# Patient Record
Sex: MALE | Race: BLACK OR AFRICAN AMERICAN | NOT HISPANIC OR LATINO | Employment: FULL TIME | URBAN - METROPOLITAN AREA
[De-identification: names, ages, dates, MRNs, and addresses within clinical notes are randomized per-mention and may not be internally consistent; named-entity substitution may affect disease eponyms.]

---

## 2017-04-20 ENCOUNTER — CLINICAL SUPPORT (OUTPATIENT)
Dept: INTERNAL MEDICINE | Facility: CLINIC | Age: 42
End: 2017-04-20
Payer: COMMERCIAL

## 2017-04-20 ENCOUNTER — TELEPHONE (OUTPATIENT)
Dept: INTERNAL MEDICINE | Facility: CLINIC | Age: 42
End: 2017-04-20

## 2017-04-20 ENCOUNTER — OFFICE VISIT (OUTPATIENT)
Dept: INTERNAL MEDICINE | Facility: CLINIC | Age: 42
End: 2017-04-20
Payer: COMMERCIAL

## 2017-04-20 VITALS
OXYGEN SATURATION: 98 % | HEIGHT: 76 IN | DIASTOLIC BLOOD PRESSURE: 70 MMHG | BODY MASS INDEX: 32.85 KG/M2 | HEART RATE: 56 BPM | WEIGHT: 269.81 LBS | SYSTOLIC BLOOD PRESSURE: 112 MMHG

## 2017-04-20 DIAGNOSIS — Z00.00 ANNUAL PHYSICAL EXAM: Primary | ICD-10-CM

## 2017-04-20 DIAGNOSIS — R51.9 NONINTRACTABLE HEADACHE, UNSPECIFIED CHRONICITY PATTERN, UNSPECIFIED HEADACHE TYPE: ICD-10-CM

## 2017-04-20 DIAGNOSIS — Z00.00 ROUTINE GENERAL MEDICAL EXAMINATION AT A HEALTH CARE FACILITY: Primary | ICD-10-CM

## 2017-04-20 LAB
ALBUMIN SERPL BCP-MCNC: 3.7 G/DL
ALP SERPL-CCNC: 42 U/L
ALT SERPL W/O P-5'-P-CCNC: 17 U/L
ANION GAP SERPL CALC-SCNC: 8 MMOL/L
AST SERPL-CCNC: 16 U/L
BILIRUB SERPL-MCNC: 0.9 MG/DL
BUN SERPL-MCNC: 14 MG/DL
CALCIUM SERPL-MCNC: 8.8 MG/DL
CHLORIDE SERPL-SCNC: 107 MMOL/L
CHOLEST/HDLC SERPL: 3.9 {RATIO}
CO2 SERPL-SCNC: 26 MMOL/L
CREAT SERPL-MCNC: 1.1 MG/DL
ERYTHROCYTE [DISTWIDTH] IN BLOOD BY AUTOMATED COUNT: 13.5 %
EST. GFR  (AFRICAN AMERICAN): >60 ML/MIN/1.73 M^2
EST. GFR  (NON AFRICAN AMERICAN): >60 ML/MIN/1.73 M^2
GLUCOSE SERPL-MCNC: 92 MG/DL
HCT VFR BLD AUTO: 40.3 %
HDL/CHOLESTEROL RATIO: 25.4 %
HDLC SERPL-MCNC: 209 MG/DL
HDLC SERPL-MCNC: 53 MG/DL
HGB BLD-MCNC: 13.9 G/DL
HIV 1+2 AB+HIV1 P24 AG SERPL QL IA: NEGATIVE
LDLC SERPL CALC-MCNC: 145.2 MG/DL
MCH RBC QN AUTO: 29.1 PG
MCHC RBC AUTO-ENTMCNC: 34.5 %
MCV RBC AUTO: 84 FL
NONHDLC SERPL-MCNC: 156 MG/DL
PLATELET # BLD AUTO: 220 K/UL
PMV BLD AUTO: 9.5 FL
POTASSIUM SERPL-SCNC: 4.3 MMOL/L
PROT SERPL-MCNC: 6.8 G/DL
RBC # BLD AUTO: 4.78 M/UL
SODIUM SERPL-SCNC: 141 MMOL/L
TRIGL SERPL-MCNC: 54 MG/DL
WBC # BLD AUTO: 3.33 K/UL

## 2017-04-20 PROCEDURE — 86703 HIV-1/HIV-2 1 RESULT ANTBDY: CPT

## 2017-04-20 PROCEDURE — 36415 COLL VENOUS BLD VENIPUNCTURE: CPT

## 2017-04-20 PROCEDURE — 99385 PREV VISIT NEW AGE 18-39: CPT | Mod: S$GLB,,, | Performed by: FAMILY MEDICINE

## 2017-04-20 PROCEDURE — 85027 COMPLETE CBC AUTOMATED: CPT

## 2017-04-20 PROCEDURE — 80053 COMPREHEN METABOLIC PANEL: CPT

## 2017-04-20 PROCEDURE — 99999 PR PBB SHADOW E&M-NEW PATIENT-LVL III: CPT | Mod: PBBFAC,,, | Performed by: FAMILY MEDICINE

## 2017-04-20 PROCEDURE — 80061 LIPID PANEL: CPT

## 2017-04-20 NOTE — MR AVS SNAPSHOT
"    [facility]  [address]  [place] [address]  Phone: [phone]  Fax: [phone]                  Daniel Garcias   2017 10:00 AM   Office Visit    Description:  Male : 1975   Provider:  Donte Izquierdo MD   Department:  [facility]           Reason for Visit     Executive Health           Diagnoses this Visit        Comments    Annual physical exam    -  Primary     Nonintractable headache, unspecified chronicity pattern, unspecified headache type     followed by outside neurologist           To Do List           Goals (5 Years of Data)     None      Follow-Up and Disposition     Return in about 1 year (around 2018) for annual physical exam.    Follow-up and Disposition History      [facility]     [facility] Care Line -  Assistance  Unless otherwise directed by your provider, please contact [facility], our nurse care line that is available for  assistance.     Registered nurses in the [facility] provide: appointment scheduling, clinical advisement, health education, and other advisory services.  Call: [phone] (toll free)               Medications           Message regarding Medications     Verify the changes and/or additions to your medication regime listed below are the same as discussed with your clinician today.  If any of these changes or additions are incorrect, please notify your healthcare provider.             Verify that the below list of medications is an accurate representation of the medications you are currently taking.  If none reported, the list may be blank. If incorrect, please contact your healthcare provider. Carry this list with you in case of emergency.                Clinical Reference Information           Your Vitals Were     BP Pulse Height Weight SpO2 BMI    112/70 (BP Location: Left arm, Patient Position: Sitting, BP Method: Manual) 56 6' 4" (1.93 m) 122.4 kg (269 lb " 13.5 oz) 98% 32.85 kg/m2      Blood Pressure          Most Recent Value    BP  112/70      Allergies as of 4/20/2017     Shellfish Containing Products      Immunizations Administered on Date of Encounter - 4/20/2017     None      [facility] Sign-Up     Activating your [facility] account is as easy as 1-2-3!     1) Visit [email or web address], select Sign Up Now, enter this activation code and your date of birth, then select Next.  [email or web address]  Expires: 6/4/2017 10:24 AM      2) Create a username and password to use when you visit [facility] in the future and select a security question in case you lose your password and select Next.    3) Enter your e-mail address and click Sign Up!    Additional Information  If you have questions, please e-mail [email or web address] or call [phone] to talk to our [facility] staff. Remember, [facility] is NOT to be used for urgent needs. For medical emergencies, dial 911.         Instructions      http://www.nhlbi.nih.gov/health/health-topics/topics/hbc    http://www.nhlbi.nih.gov/health/educational/lose_wt/index.htm    http://www.nhlbi.nih.gov/files/docs/public/heart/hbp_low.pdf             Language Assistance Services     ATTENTION: Language assistance services are available, free of charge. Please call [phone].      ATENCIÓN: Si habla español, tiene a morrissey disposición servicios gratuitos de asistencia lingüística. Llame al [phone].     CHÚ Ý: N?u b?n nói Ti?ng Vi?t, có các d?ch v? h? tr? ngôn ng? mi?n phí dành cho b?n. G?i s? [phone].         [facility] complies with applicable Federal civil rights laws and does not discriminate on the basis of race, color, national origin, age, disability, or sex.

## 2017-04-20 NOTE — PROGRESS NOTES
Subjective:       Patient ID: Daniel Garcias is a 41 y.o. male.    Chief Complaint: Executive Health    HPI Comments: Established patient for an annual wellness check/physical exam. No specific complaints, please see ROS.      Review of Systems   Constitutional: Negative for chills, fatigue and fever.   HENT: Negative for congestion and trouble swallowing.    Eyes: Negative for redness.   Respiratory: Negative for cough, chest tightness and shortness of breath.    Cardiovascular: Negative for chest pain, palpitations and leg swelling.   Gastrointestinal: Negative for abdominal pain and blood in stool.   Genitourinary: Negative for hematuria.   Musculoskeletal: Negative for arthralgias, back pain, gait problem, joint swelling, myalgias and neck pain.   Skin: Negative for color change and rash.   Neurological: Positive for headaches. Negative for tremors, speech difficulty, weakness and numbness.   Hematological: Negative for adenopathy. Does not bruise/bleed easily.   Psychiatric/Behavioral: Negative for behavioral problems, confusion and sleep disturbance. The patient is not nervous/anxious.        Objective:      Physical Exam   Constitutional: He appears well-developed and well-nourished.   HENT:   Head: Normocephalic.   Right Ear: Tympanic membrane, external ear and ear canal normal.   Left Ear: Tympanic membrane, external ear and ear canal normal.   Mouth/Throat: Oropharynx is clear and moist.   Eyes: Pupils are equal, round, and reactive to light.   Neck: Normal range of motion. Neck supple. Carotid bruit is not present. No thyromegaly present.   Cardiovascular: Normal rate, regular rhythm, normal heart sounds and intact distal pulses.  Exam reveals no gallop and no friction rub.    No murmur heard.  Pulmonary/Chest: Effort normal and breath sounds normal.   Abdominal: Soft. Bowel sounds are normal. He exhibits no distension and no mass. There is no tenderness. There is no rebound and no guarding.    Musculoskeletal: Normal range of motion. He exhibits no edema or tenderness.   Lymphadenopathy:     He has no cervical adenopathy.   Neurological: He is alert. He has normal strength. He displays normal reflexes. No cranial nerve deficit or sensory deficit. Coordination and gait normal.   Skin: Skin is warm and dry.   Psychiatric: He has a normal mood and affect. His behavior is normal. Judgment and thought content normal.   Nursing note and vitals reviewed.      Assessment:       1. Annual physical exam    2. Nonintractable headache, unspecified chronicity pattern, unspecified headache type        Plan:   Daniel was seen today for [employer] health.    Diagnoses and all orders for this visit:    Annual physical exam    Nonintractable headache, unspecified chronicity pattern, unspecified headache type  Comments:  followed by outside neurologist      See meds, orders, follow up, routing and instructions sections of encounter.  This gentleman is in for an executive health physical examination as a new   patient.  He had what may have been a TIA in 11/2007 with no recurrences.  He   described this as numbness to the right side of the body.  He had an extensive   workup including an echo, MRA and MRI of brain and MRI of the C-spine, all of   which were reviewed.    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      RECOMMENDATIONS:  He did have elevated cholesterol in the past, similar today,   and discussed diet and exercise, some component of this is genetic.  Presumed   follow up in one year.      MELANIE/ZOË  dd: 04/20/2017 13:39:44 (CDT)  td: 04/21/2017 03:38:25 (CDT)  Doc ID   #[id number]  Job ID #[id number]    CC:

## 2017-04-20 NOTE — PROGRESS NOTES
Please call patient and report test/lab results were ok and that the cholesterol level was almost exactly same as last year. I sent him a lab letter with the actual numbers. Thanks.

## 2017-12-05 ENCOUNTER — OFFICE VISIT (OUTPATIENT)
Dept: INTERNAL MEDICINE | Facility: CLINIC | Age: 42
End: 2017-12-05
Attending: FAMILY MEDICINE
Payer: COMMERCIAL

## 2017-12-05 VITALS
DIASTOLIC BLOOD PRESSURE: 90 MMHG | SYSTOLIC BLOOD PRESSURE: 145 MMHG | HEIGHT: 76 IN | BODY MASS INDEX: 33.41 KG/M2 | WEIGHT: 274.38 LBS

## 2017-12-05 DIAGNOSIS — R14.0 BLOATING: Primary | ICD-10-CM

## 2017-12-05 DIAGNOSIS — M54.5 LOW BACK PAIN, UNSPECIFIED BACK PAIN LATERALITY, UNSPECIFIED CHRONICITY, WITH SCIATICA PRESENCE UNSPECIFIED: ICD-10-CM

## 2017-12-05 DIAGNOSIS — R03.0 BLOOD PRESSURE ELEVATED WITHOUT HISTORY OF HTN: ICD-10-CM

## 2017-12-05 PROCEDURE — 99999 PR PBB SHADOW E&M-EST. PATIENT-LVL III: CPT | Mod: PBBFAC,,, | Performed by: FAMILY MEDICINE

## 2017-12-05 PROCEDURE — 99214 OFFICE O/P EST MOD 30 MIN: CPT | Mod: S$GLB,,, | Performed by: FAMILY MEDICINE

## 2017-12-05 RX ORDER — GABAPENTIN 300 MG/1
CAPSULE ORAL
COMMUNITY
Start: 2017-09-21 | End: 2018-03-12

## 2017-12-05 NOTE — PROGRESS NOTES
Subjective:       Patient ID: Daniel Garcias is a 42 y.o. male.    Chief Complaint: Follow-up    HPI  Review of Systems   Constitutional: Negative for chills, fatigue and fever.   HENT: Negative for congestion and trouble swallowing.    Eyes: Negative for redness.   Respiratory: Negative for cough, chest tightness and shortness of breath.    Cardiovascular: Negative for chest pain, palpitations and leg swelling.   Gastrointestinal: Positive for abdominal distention. Negative for abdominal pain, blood in stool and diarrhea.   Genitourinary: Negative for hematuria.   Musculoskeletal: Negative for arthralgias, back pain, gait problem, joint swelling, myalgias and neck pain.   Skin: Negative for color change and rash.   Neurological: Negative for tremors, speech difficulty, weakness, numbness and headaches.   Hematological: Negative for adenopathy. Does not bruise/bleed easily.   Psychiatric/Behavioral: Negative for behavioral problems, confusion and sleep disturbance. The patient is not nervous/anxious.        Objective:      Physical Exam   Constitutional: He is oriented to person, place, and time. He appears well-developed and well-nourished.   Eyes: No scleral icterus.   Neck: Normal range of motion. Neck supple. Carotid bruit is not present.   Cardiovascular: Normal rate, regular rhythm, normal heart sounds and intact distal pulses.  Exam reveals no gallop and no friction rub.    No murmur heard.  Pulmonary/Chest: Effort normal and breath sounds normal. No respiratory distress. He has no wheezes. He has no rales.   Abdominal: Soft. Bowel sounds are normal. He exhibits no distension and no mass. There is no tenderness. There is no rebound and no guarding.   Musculoskeletal: He exhibits no edema.   Neurological: He is alert and oriented to person, place, and time. He displays no tremor. No cranial nerve deficit. Coordination and gait normal.   Skin: Skin is warm and dry. No rash noted. He is not diaphoretic. No  erythema.   Psychiatric: He has a normal mood and affect. His behavior is normal. Judgment and thought content normal.   Nursing note and vitals reviewed.      Assessment:       1. Bloating    2. Low back pain, unspecified back pain laterality, unspecified chronicity, with sciatica presence unspecified    3. Blood pressure elevated without history of HTN        Plan:   Daniel was seen today for follow-up.    Diagnoses and all orders for this visit:    Bloating    Low back pain, unspecified back pain laterality, unspecified chronicity, with sciatica presence unspecified    Blood pressure elevated without history of HTN    Other orders  -     ranitidine (ZANTAC) 300 MG tablet; Take 1 tablet (300 mg total) by mouth every evening.      See meds, orders, follow up, routing and instructions sections of encounter.  A  42-year-old established male patient with gas and bloating one month.  No   definite diarrhea.  No fever, chills, abdominal pain or constitutional   complaints.    RECOMMENDATIONS:  Trial of Zantac, trial of probiotic.   Follow up in one month   for blood pressure recheck.      SHERRON  dd: 12/05/2017 17:40:57 (CST)  td: 12/06/2017 03:01:17 (CST)  Doc ID   #[id number]  Job ID #[id number]    CC:

## 2018-01-18 ENCOUNTER — TELEPHONE (OUTPATIENT)
Dept: INTERNAL MEDICINE | Facility: CLINIC | Age: 43
End: 2018-01-18

## 2018-03-12 ENCOUNTER — OFFICE VISIT (OUTPATIENT)
Dept: GASTROENTEROLOGY | Facility: CLINIC | Age: 43
End: 2018-03-12
Payer: COMMERCIAL

## 2018-03-12 VITALS
BODY MASS INDEX: 34.17 KG/M2 | SYSTOLIC BLOOD PRESSURE: 153 MMHG | HEIGHT: 76 IN | HEART RATE: 64 BPM | WEIGHT: 280.63 LBS | DIASTOLIC BLOOD PRESSURE: 98 MMHG

## 2018-03-12 DIAGNOSIS — Z80.0 FAMILY HISTORY OF COLON CANCER: Primary | ICD-10-CM

## 2018-03-12 DIAGNOSIS — R14.0 BLOATING: ICD-10-CM

## 2018-03-12 PROCEDURE — 99999 PR PBB SHADOW E&M-EST. PATIENT-LVL III: CPT | Mod: PBBFAC,,, | Performed by: INTERNAL MEDICINE

## 2018-03-12 PROCEDURE — 99204 OFFICE O/P NEW MOD 45 MIN: CPT | Mod: S$GLB,,, | Performed by: INTERNAL MEDICINE

## 2018-03-12 NOTE — PROGRESS NOTES
Subjective:       Patient ID: Daniel Garcias is a 42 y.o. male.    Chief Complaint: Gas; Bloated; and Constipation    Constipation   This is a new problem. Episode onset: 2 months. The problem is unchanged. His stool frequency is 1 time per day. The patient is on a high fiber diet. He exercises regularly. There has been adequate water intake. Associated symptoms include abdominal pain (intermittent) and bloating. Pertinent negatives include no hematochezia, nausea or weight loss. He has tried nothing for the symptoms. There is no history of abdominal surgery, irritable bowel syndrome or psychiatric history.   Has intractable bloating and gas.  [place] 3 or 4.    Maternal grand mother with a history of Colon cancer. Also a maternal aunt with a history of pancreatic cancer.   Review of Systems   Constitutional: Negative for weight loss.   Gastrointestinal: Positive for abdominal pain (intermittent), bloating and constipation. Negative for hematochezia and nausea.       No past medical history on file.    Past Surgical History:   Procedure Laterality Date    KNEE ARTHROSCOPY W/ ACL RECONSTRUCTION      left    KNEE ARTHROSCOPY W/ MENISCAL REPAIR  1998    left       Family History   Problem Relation Age of Onset    Hypertension Mother     Hyperlipidemia Father     Cancer Maternal Grandmother      colon       Social History     Social History    Marital status:      Spouse name: N/A    Number of children: 3    Years of education: N/A     Occupational History    Operations      Social History Main Topics    Smoking status: Never Smoker    Smokeless tobacco: Never Used    Alcohol use No    Drug use: No    Sexual activity: Yes     Partners: Female     Other Topics Concern    None     Social History Narrative    None       Current Outpatient Prescriptions   Medication Sig Dispense Refill    peg 3350-electrolytes (COLYTE WITH FLAVOR PACKS) 227.1-21.5-6.36 gram SolR Take 4 L by mouth once. 1 Bottle 0      No current facility-administered medications for this visit.        Review of patient's allergies indicates:   Allergen Reactions    Shellfish containing products Hives       Objective:       Vitals:    03/12/18 1515   BP: (!) 153/98   Pulse: 64       Physical Exam   Constitutional: He is oriented to person, place, and time. He appears well-nourished.   Eyes: Pupils are equal, round, and reactive to light.   Cardiovascular: Normal rate, regular rhythm and normal heart sounds.    Pulmonary/Chest: No respiratory distress. He has no wheezes.   Abdominal: He exhibits no mass. There is no tenderness. There is no rebound and no guarding.   Musculoskeletal: He exhibits no edema.   Neurological: He is alert and oriented to person, place, and time.   Skin: No rash noted.   Psychiatric: He has a normal mood and affect. Thought content normal.       Assessment:       1. Family history of colon cancer    2. Bloating        Plan:       Daniel was seen today for gas, bloated and constipation.    Diagnoses and all orders for this visit:    Family history of colon cancer  Bloating  -     Case request GI: COLONOSCOPY golytely    Other orders  -     peg 3350-electrolytes (COLYTE WITH FLAVOR PACKS) 227.1-21.5-6.36 gram SolR; Take 4 L by mouth once.

## 2018-04-11 ENCOUNTER — ANESTHESIA EVENT (OUTPATIENT)
Dept: ENDOSCOPY | Facility: HOSPITAL | Age: 43
End: 2018-04-11
Payer: COMMERCIAL

## 2018-04-11 ENCOUNTER — SURGERY (OUTPATIENT)
Age: 43
End: 2018-04-11

## 2018-04-11 ENCOUNTER — HOSPITAL ENCOUNTER (OUTPATIENT)
Facility: HOSPITAL | Age: 43
Discharge: HOME | End: 2018-04-11
Attending: INTERNAL MEDICINE | Admitting: INTERNAL MEDICINE
Payer: COMMERCIAL

## 2018-04-11 ENCOUNTER — ANESTHESIA (OUTPATIENT)
Dept: ENDOSCOPY | Facility: HOSPITAL | Age: 43
End: 2018-04-11
Payer: COMMERCIAL

## 2018-04-11 DIAGNOSIS — Z80.0 FAMILY HISTORY OF COLON CANCER: ICD-10-CM

## 2018-04-11 PROCEDURE — 37000008 HC ANESTHESIA 1ST 15 MINUTES: Performed by: INTERNAL MEDICINE

## 2018-04-11 PROCEDURE — G0105 COLORECTAL SCRN; HI RISK IND: HCPCS | Mod: ,,, | Performed by: INTERNAL MEDICINE

## 2018-04-11 PROCEDURE — 45378 DIAGNOSTIC COLONOSCOPY: CPT | Performed by: INTERNAL MEDICINE

## 2018-04-11 PROCEDURE — 37000009 HC ANESTHESIA EA ADD 15 MINS: Performed by: INTERNAL MEDICINE

## 2018-04-11 PROCEDURE — 25000003 PHARM REV CODE 250: Performed by: INTERNAL MEDICINE

## 2018-04-11 PROCEDURE — 63600175 PHARM REV CODE 636 W HCPCS: Performed by: NURSE ANESTHETIST, CERTIFIED REGISTERED

## 2018-04-11 RX ORDER — PROPOFOL 10 MG/ML
VIAL (ML) INTRAVENOUS
Status: DISCONTINUED | OUTPATIENT
Start: 2018-04-11 | End: 2018-04-11

## 2018-04-11 RX ORDER — SODIUM CHLORIDE 9 MG/ML
INJECTION, SOLUTION INTRAVENOUS CONTINUOUS
Status: DISCONTINUED | OUTPATIENT
Start: 2018-04-11 | End: 2018-04-11 | Stop reason: HOSPADM

## 2018-04-11 RX ORDER — LIDOCAINE HCL/PF 100 MG/5ML
SYRINGE (ML) INTRAVENOUS
Status: DISCONTINUED | OUTPATIENT
Start: 2018-04-11 | End: 2018-04-11

## 2018-04-11 RX ORDER — PROPOFOL 10 MG/ML
VIAL (ML) INTRAVENOUS CONTINUOUS PRN
Status: DISCONTINUED | OUTPATIENT
Start: 2018-04-11 | End: 2018-04-11

## 2018-04-11 RX ADMIN — PROPOFOL 20 MG: 10 INJECTION, EMULSION INTRAVENOUS at 02:04

## 2018-04-11 RX ADMIN — LIDOCAINE HYDROCHLORIDE 20 MG: 20 INJECTION, SOLUTION INTRAVENOUS at 02:04

## 2018-04-11 RX ADMIN — SODIUM CHLORIDE: 0.9 INJECTION, SOLUTION INTRAVENOUS at 12:04

## 2018-04-11 RX ADMIN — PROPOFOL 30 MG: 10 INJECTION, EMULSION INTRAVENOUS at 02:04

## 2018-04-11 RX ADMIN — PROPOFOL 80 MG: 10 INJECTION, EMULSION INTRAVENOUS at 02:04

## 2018-04-11 RX ADMIN — PROPOFOL 10 MG: 10 INJECTION, EMULSION INTRAVENOUS at 02:04

## 2018-04-11 RX ADMIN — PROPOFOL 150 MCG/KG/MIN: 10 INJECTION, EMULSION INTRAVENOUS at 02:04

## 2018-04-11 NOTE — INTERVAL H&P NOTE
The patient has been examined and the H&P has been reviewed:    I concur with the findings and no changes have occurred since H&P was written.    Anesthesia/Surgery risks, benefits and alternative options discussed and understood by patient/family.          Active Hospital Problems    Diagnosis  POA    Family history of colon cancer [Z80.0]  Not Applicable      Resolved Hospital Problems    Diagnosis Date Resolved POA   No resolved problems to display.

## 2018-04-11 NOTE — PROVATION PATIENT INSTRUCTIONS
Discharge Summary/Instructions after an Endoscopic Procedure  Patient Name: Daniel Garcias  Patient MRN: [id number]  Patient YOB: 1975 Wednesday, April 11, 2018  Deborah Gamino MD  RESTRICTIONS:  During your procedure today, you received medications for sedation.  These   medications may affect your judgment, balance and coordination.  Therefore,   for 24 hours, you have the following restrictions:   - DO NOT drive a car, operate machinery, make legal/financial decisions,   sign important papers or drink alcohol.    ACTIVITY:  The following day: return to full activity including work, except no heavy   lifting, straining or running for 3 days if polyps were removed.  DIET:  Eat and drink normally unless instructed otherwise.     TREATMENT FOR COMMON SIDE EFFECTS:  - Mild abdominal pain, nausea, belching, bloating or excessive gas:  rest,   eat lightly and use a heating pad.  - Sore Throat: treat with throat lozenges and/or gargle with warm salt   water.  - Because air was used during the procedure, expelling large amounts of air   from your rectum or belching is normal.  - If a bowel prep was taken, you may not have a bowel movement for 1-3 days.    This is normal.  SYMPTOMS TO WATCH FOR AND REPORT TO YOUR PHYSICIAN:  1. Abdominal pain or bloating, other than gas cramps.  2. Chest pain.  3. Back pain.  4. Signs of infection such as: chills or fever occurring within 24 hours   after the procedure.  5. Rectal bleeding, which would show as bright red, maroon, or black stools.   (A tablespoon of blood from the rectum is not serious, especially if   hemorrhoids are present.)  6. Vomiting.  7. Weakness or dizziness.  GO DIRECTLY TO THE NEAREST EMERGENCY ROOM IF YOU HAVE ANY OF THE FOLLOWING:      Difficulty breathing              Chills and/or fever over 101 F   Persistent vomiting and/or vomiting blood   Severe abdominal pain   Severe chest pain   Black, tarry stools   Bleeding- more than one  tablespoon   Any other symptom or condition that you feel may need urgent attention  Your doctor recommends these additional instructions:  If any biopsies were taken, your doctors clinic will contact you in 1 to 2   weeks with any results.  - Discharge patient to home.   - Repeat colonoscopy in 5 years for surveillance.  For questions, problems or results please call your physician - Deborah Gamino MD at Work:  [phone].  EMERGENCY PHONE NUMBER: [phone],  LAB RESULTS: [phone]  IF A COMPLICATION OR EMERGENCY SITUATION ARISES AND YOU ARE UNABLE TO REACH   YOUR PHYSICIAN - GO DIRECTLY TO THE EMERGENCY ROOM.  Deborah Gamino MD  4/11/2018 2:57:11 PM  This report has been verified and signed electronically.

## 2018-04-11 NOTE — PLAN OF CARE
No past medical history on file.  Accompanied by spouse, ok for MD to speak to her re results of exam.

## 2018-04-11 NOTE — TRANSFER OF CARE
"Anesthesia Transfer of Care Note    Patient: Daniel Garcias    Procedure(s) Performed: Procedure(s) (LRB):  COLONOSCOPY golytely (N/A)    Patient location: GI    Anesthesia Type: MAC    Transport from OR: Transported from OR on room air with adequate spontaneous ventilation    Post pain: adequate analgesia    Post assessment: no apparent anesthetic complications    Post vital signs: stable    Level of consciousness: sedated    Nausea/Vomiting: no nausea/vomiting    Complications: none    Transfer of care protocol was followed      Last vitals:   Visit Vitals  BP (!) 154/70 (BP Location: Left arm)   Pulse 75   Temp 37.3 °C (99.1 °F) (Oral)   Resp 20   Ht 6' 4" (1.93 m)   Wt 122.5 kg (270 lb)   SpO2 98%   BMI 32.87 kg/m²     "

## 2018-04-11 NOTE — ANESTHESIA POSTPROCEDURE EVALUATION
"Anesthesia Post Evaluation    Patient: Daniel Garcias    Procedure(s) Performed: Procedure(s) (LRB):  COLONOSCOPY golytely (N/A)    Final Anesthesia Type: MAC  Patient location during evaluation: GI PACU  Patient participation: Yes- Able to Participate  Level of consciousness: awake and alert and oriented  Post-procedure vital signs: reviewed and stable  Pain management: adequate  Airway patency: patent  PONV status at discharge: No PONV  Anesthetic complications: no      Cardiovascular status: stable, hemodynamically stable and blood pressure returned to baseline  Respiratory status: room air, spontaneous ventilation and unassisted  Hydration status: euvolemic  Follow-up not needed.        Visit Vitals  BP (!) 154/70 (BP Location: Left arm)   Pulse 75   Temp 37.3 °C (99.1 °F) (Oral)   Resp 20   Ht 6' 4" (1.93 m)   Wt 122.5 kg (270 lb)   SpO2 98%   BMI 32.87 kg/m²       Pain/Wendy Score: No Data Recorded      "

## 2018-04-11 NOTE — H&P (VIEW-ONLY)
Subjective:       Patient ID: Daniel Garcias is a 42 y.o. male.    Chief Complaint: Gas; Bloated; and Constipation    Constipation   This is a new problem. Episode onset: 2 months. The problem is unchanged. His stool frequency is 1 time per day. The patient is on a high fiber diet. He exercises regularly. There has been adequate water intake. Associated symptoms include abdominal pain (intermittent) and bloating. Pertinent negatives include no hematochezia, nausea or weight loss. He has tried nothing for the symptoms. There is no history of abdominal surgery, irritable bowel syndrome or psychiatric history.   Has intractable bloating and gas.  [place] 3 or 4.    Maternal grand mother with a history of Colon cancer. Also a maternal aunt with a history of pancreatic cancer.   Review of Systems   Constitutional: Negative for weight loss.   Gastrointestinal: Positive for abdominal pain (intermittent), bloating and constipation. Negative for hematochezia and nausea.       No past medical history on file.    Past Surgical History:   Procedure Laterality Date    KNEE ARTHROSCOPY W/ ACL RECONSTRUCTION      left    KNEE ARTHROSCOPY W/ MENISCAL REPAIR  1998    left       Family History   Problem Relation Age of Onset    Hypertension Mother     Hyperlipidemia Father     Cancer Maternal Grandmother      colon       Social History     Social History    Marital status:      Spouse name: N/A    Number of children: 3    Years of education: N/A     Occupational History    Operations      Social History Main Topics    Smoking status: Never Smoker    Smokeless tobacco: Never Used    Alcohol use No    Drug use: No    Sexual activity: Yes     Partners: Female     Other Topics Concern    None     Social History Narrative    None       Current Outpatient Prescriptions   Medication Sig Dispense Refill    peg 3350-electrolytes (COLYTE WITH FLAVOR PACKS) 227.1-21.5-6.36 gram SolR Take 4 L by mouth once. 1 Bottle 0      No current facility-administered medications for this visit.        Review of patient's allergies indicates:   Allergen Reactions    Shellfish containing products Hives       Objective:       Vitals:    03/12/18 1515   BP: (!) 153/98   Pulse: 64       Physical Exam   Constitutional: He is oriented to person, place, and time. He appears well-nourished.   Eyes: Pupils are equal, round, and reactive to light.   Cardiovascular: Normal rate, regular rhythm and normal heart sounds.    Pulmonary/Chest: No respiratory distress. He has no wheezes.   Abdominal: He exhibits no mass. There is no tenderness. There is no rebound and no guarding.   Musculoskeletal: He exhibits no edema.   Neurological: He is alert and oriented to person, place, and time.   Skin: No rash noted.   Psychiatric: He has a normal mood and affect. Thought content normal.       Assessment:       1. Family history of colon cancer    2. Bloating        Plan:       Daniel was seen today for gas, bloated and constipation.    Diagnoses and all orders for this visit:    Family history of colon cancer  Bloating  -     Case request GI: COLONOSCOPY golytely    Other orders  -     peg 3350-electrolytes (COLYTE WITH FLAVOR PACKS) 227.1-21.5-6.36 gram SolR; Take 4 L by mouth once.

## 2018-04-11 NOTE — ANESTHESIA PREPROCEDURE EVALUATION
04/11/2018  Daniel Garcias is a 42 y.o., male for colonoscopy under MAC    Anesthesia Evaluation    I have reviewed the Patient Summary Reports.     I have reviewed the Medications.     Review of Systems  Social:  Non-Smoker, No Alcohol Use    Cardiovascular:   Exercise tolerance: good    Hepatic/GI:   Bowel Prep.        Physical Exam  General:  Obesity       Chest/Lungs:  Chest/Lungs Clear    Heart/Vascular:  Heart Findings: Normal            Anesthesia Plan  Type of Anesthesia, risks & benefits discussed:  Anesthesia Type:  MAC  Patient's Preference:   Intra-op Monitoring Plan:   Intra-op Monitoring Plan Comments:   Post Op Pain Control Plan:   Post Op Pain Control Plan Comments:   Induction:    Beta Blocker:  Patient is not currently on a Beta-Blocker (No further documentation required).       Informed Consent: Patient understands risks and agrees with Anesthesia plan.  Questions answered. Anesthesia consent signed with patient.  ASA Score: 1     Day of Surgery Review of History & Physical:            Ready For Surgery From Anesthesia Perspective.

## 2018-04-12 VITALS
HEIGHT: 76 IN | DIASTOLIC BLOOD PRESSURE: 76 MMHG | TEMPERATURE: 99 F | BODY MASS INDEX: 32.88 KG/M2 | HEART RATE: 62 BPM | WEIGHT: 270 LBS | RESPIRATION RATE: 16 BRPM | OXYGEN SATURATION: 100 % | SYSTOLIC BLOOD PRESSURE: 154 MMHG

## 2018-05-16 DIAGNOSIS — Z00.00 ROUTINE GENERAL MEDICAL EXAMINATION AT A HEALTH CARE FACILITY: Primary | ICD-10-CM

## 2018-06-04 ENCOUNTER — OFFICE VISIT (OUTPATIENT)
Dept: INTERNAL MEDICINE | Facility: CLINIC | Age: 43
End: 2018-06-04
Attending: FAMILY MEDICINE
Payer: COMMERCIAL

## 2018-06-04 ENCOUNTER — HOSPITAL ENCOUNTER (OUTPATIENT)
Dept: CARDIOLOGY | Facility: CLINIC | Age: 43
Discharge: HOME | End: 2018-06-04
Attending: FAMILY MEDICINE
Payer: COMMERCIAL

## 2018-06-04 ENCOUNTER — CLINICAL SUPPORT (OUTPATIENT)
Dept: INTERNAL MEDICINE | Facility: CLINIC | Age: 43
End: 2018-06-04
Payer: COMMERCIAL

## 2018-06-04 VITALS
WEIGHT: 271.63 LBS | SYSTOLIC BLOOD PRESSURE: 121 MMHG | DIASTOLIC BLOOD PRESSURE: 81 MMHG | HEART RATE: 63 BPM | HEIGHT: 76 IN | BODY MASS INDEX: 33.08 KG/M2

## 2018-06-04 DIAGNOSIS — Z00.00 ROUTINE GENERAL MEDICAL EXAMINATION AT A HEALTH CARE FACILITY: Primary | ICD-10-CM

## 2018-06-04 DIAGNOSIS — E78.5 HYPERLIPIDEMIA, UNSPECIFIED HYPERLIPIDEMIA TYPE: ICD-10-CM

## 2018-06-04 DIAGNOSIS — Z00.00 ANNUAL PHYSICAL EXAM: Primary | ICD-10-CM

## 2018-06-04 DIAGNOSIS — Z00.00 ROUTINE GENERAL MEDICAL EXAMINATION AT A HEALTH CARE FACILITY: ICD-10-CM

## 2018-06-04 LAB
ALBUMIN SERPL BCP-MCNC: 3.8 G/DL
ALP SERPL-CCNC: 44 U/L
ALT SERPL W/O P-5'-P-CCNC: 15 U/L
ANION GAP SERPL CALC-SCNC: 7 MMOL/L
AST SERPL-CCNC: 11 U/L
BILIRUB SERPL-MCNC: 0.7 MG/DL
BUN SERPL-MCNC: 13 MG/DL
CALCIUM SERPL-MCNC: 9.1 MG/DL
CHLORIDE SERPL-SCNC: 107 MMOL/L
CHOLEST SERPL-MCNC: 250 MG/DL
CHOLEST/HDLC SERPL: 5.3 {RATIO}
CO2 SERPL-SCNC: 29 MMOL/L
COMPLEXED PSA SERPL-MCNC: 0.59 NG/ML
CREAT SERPL-MCNC: 1.2 MG/DL
ERYTHROCYTE [DISTWIDTH] IN BLOOD BY AUTOMATED COUNT: 13.2 %
EST. GFR  (AFRICAN AMERICAN): >60 ML/MIN/1.73 M^2
EST. GFR  (NON AFRICAN AMERICAN): >60 ML/MIN/1.73 M^2
GLUCOSE SERPL-MCNC: 89 MG/DL
HCT VFR BLD AUTO: 43.4 %
HDLC SERPL-MCNC: 47 MG/DL
HDLC SERPL: 18.8 %
HGB BLD-MCNC: 14.5 G/DL
LDLC SERPL CALC-MCNC: 185 MG/DL
MCH RBC QN AUTO: 28.9 PG
MCHC RBC AUTO-ENTMCNC: 33.4 G/DL
MCV RBC AUTO: 87 FL
NONHDLC SERPL-MCNC: 203 MG/DL
PLATELET # BLD AUTO: 256 K/UL
PMV BLD AUTO: 9.6 FL
POTASSIUM SERPL-SCNC: 4.1 MMOL/L
PROT SERPL-MCNC: 7.1 G/DL
RBC # BLD AUTO: 5.01 M/UL
SODIUM SERPL-SCNC: 143 MMOL/L
TRIGL SERPL-MCNC: 90 MG/DL
WBC # BLD AUTO: 4.21 K/UL

## 2018-06-04 PROCEDURE — 84153 ASSAY OF PSA TOTAL: CPT

## 2018-06-04 PROCEDURE — 85027 COMPLETE CBC AUTOMATED: CPT

## 2018-06-04 PROCEDURE — 80053 COMPREHEN METABOLIC PANEL: CPT

## 2018-06-04 PROCEDURE — 80061 LIPID PANEL: CPT

## 2018-06-04 PROCEDURE — 99396 PREV VISIT EST AGE 40-64: CPT | Mod: S$GLB,,, | Performed by: FAMILY MEDICINE

## 2018-06-04 PROCEDURE — 99999 PR PBB SHADOW E&M-EST. PATIENT-LVL III: CPT | Mod: PBBFAC,,, | Performed by: FAMILY MEDICINE

## 2018-06-04 PROCEDURE — 93000 ELECTROCARDIOGRAM COMPLETE: CPT | Mod: S$GLB,,, | Performed by: INTERNAL MEDICINE

## 2018-06-04 NOTE — PROGRESS NOTES
Subjective:       Patient ID: Daniel Garcias is a 42 y.o. male.    Chief Complaint: Executive Health    Established patient for an annual wellness check/physical exam and also chronic disease management. Specific complaints - see dictation and please see ROS.        Review of Systems   Constitutional: Negative for chills, fatigue and fever.   HENT: Negative for congestion and trouble swallowing.    Eyes: Negative for redness.   Respiratory: Negative for cough, chest tightness and shortness of breath.    Cardiovascular: Negative for chest pain, palpitations and leg swelling.   Gastrointestinal: Positive for abdominal distention. Negative for abdominal pain and blood in stool.   Genitourinary: Negative for hematuria.   Musculoskeletal: Negative for arthralgias, back pain, gait problem, joint swelling, myalgias and neck pain.   Skin: Negative for color change and rash.   Neurological: Negative for tremors, speech difficulty, weakness, numbness and headaches.   Hematological: Negative for adenopathy. Does not bruise/bleed easily.   Psychiatric/Behavioral: Negative for behavioral problems, confusion and sleep disturbance. The patient is not nervous/anxious.        Objective:      Physical Exam   Constitutional: He appears well-developed and well-nourished.   HENT:   Head: Normocephalic.   Right Ear: Tympanic membrane, external ear and ear canal normal.   Left Ear: Tympanic membrane, external ear and ear canal normal.   Mouth/Throat: Oropharynx is clear and moist.   Eyes: Pupils are equal, round, and reactive to light.   Neck: Normal range of motion. Neck supple. Carotid bruit is not present. No thyromegaly present.   Cardiovascular: Normal rate, regular rhythm, normal heart sounds and intact distal pulses.  Exam reveals no gallop and no friction rub.    No murmur heard.  Pulmonary/Chest: Effort normal and breath sounds normal.   Abdominal: Soft. Bowel sounds are normal. He exhibits no distension and no mass. There is  no tenderness. There is no rebound and no guarding.   Musculoskeletal: Normal range of motion. He exhibits no edema or tenderness.   Lymphadenopathy:     He has no cervical adenopathy.   Neurological: He is alert. He has normal strength. He displays normal reflexes. No cranial nerve deficit or sensory deficit. Coordination and gait normal.   Skin: Skin is warm and dry.   Psychiatric: He has a normal mood and affect. His behavior is normal. Judgment and thought content normal.   Nursing note and vitals reviewed.      Assessment:       1. Annual physical exam    2. Hyperlipidemia, unspecified hyperlipidemia type        Plan:   Daniel was seen today for [facility].    Diagnoses and all orders for this visit:    Annual physical exam    Hyperlipidemia, unspecified hyperlipidemia type      See meds, orders, follow up, routing and instructions sections of encounter.  The patient is in for annual physical examination.  [facility] laboratory   was reviewed.  LDL cholesterol 185.  Cohort calculation 3.1%.  Discussed diet   and exercise.  He does have some bloating that seems to be a relatively   infrequent problem, occurring once every one to two weeks.  There is no definite   meal association.  He has no other acute complaints at this time.  Discussed   OTC gas control medications.  Diet and exercise was discussed and recommended.    Follow up in one year.      MELANIE/HN  dd: 06/04/2018 13:25:07 (CDT)  td: 06/05/2018 03:11:01 (CDT)  Doc ID   #[id number]  Job ID #[id number]    CC:

## 2018-06-04 NOTE — PATIENT INSTRUCTIONS
Information about cholesterol, high blood pressure and healthy diet and activity recommendations can be found at the following links on the Internet.    http://www.nhlbi.nih.gov/health/health-topics/topics/hbc    http://www.nhlbi.nih.gov/health/educational/lose_wt/index.htm    Http://www.nhlbi.nih.gov/files/docs/public/heart/hbp_low.pdf    http://www.heart.org/HEARTORG/    http://diabetes.org/    https://www.cdc.gov/    https://healthfinder.gov/        Simethicone/Gas X/Beano - for gas

## 2019-04-22 ENCOUNTER — OFFICE VISIT (OUTPATIENT)
Dept: INTERNAL MEDICINE | Facility: CLINIC | Age: 44
End: 2019-04-22
Attending: FAMILY MEDICINE
Payer: COMMERCIAL

## 2019-04-22 VITALS
TEMPERATURE: 98 F | SYSTOLIC BLOOD PRESSURE: 130 MMHG | HEIGHT: 77 IN | WEIGHT: 283.38 LBS | OXYGEN SATURATION: 98 % | HEART RATE: 58 BPM | DIASTOLIC BLOOD PRESSURE: 70 MMHG | BODY MASS INDEX: 33.46 KG/M2

## 2019-04-22 DIAGNOSIS — R51.9 INTRACTABLE EPISODIC HEADACHE, UNSPECIFIED HEADACHE TYPE: Primary | ICD-10-CM

## 2019-04-22 PROCEDURE — 99214 OFFICE O/P EST MOD 30 MIN: CPT | Mod: S$GLB,,, | Performed by: FAMILY MEDICINE

## 2019-04-22 PROCEDURE — 99999 PR PBB SHADOW E&M-EST. PATIENT-LVL IV: ICD-10-PCS | Mod: PBBFAC,,, | Performed by: FAMILY MEDICINE

## 2019-04-22 PROCEDURE — 99214 PR OFFICE/OUTPT VISIT, EST, LEVL IV, 30-39 MIN: ICD-10-PCS | Mod: S$GLB,,, | Performed by: FAMILY MEDICINE

## 2019-04-22 PROCEDURE — 99999 PR PBB SHADOW E&M-EST. PATIENT-LVL IV: CPT | Mod: PBBFAC,,, | Performed by: FAMILY MEDICINE

## 2019-04-22 RX ORDER — GABAPENTIN 100 MG/1
100 CAPSULE ORAL NIGHTLY PRN
Qty: 90 CAPSULE | Refills: 0 | Status: SHIPPED | OUTPATIENT
Start: 2019-04-22 | End: 2019-06-24 | Stop reason: ALTCHOICE

## 2019-04-22 NOTE — PROGRESS NOTES
Subjective:       Patient ID: Daniel Garcias is a 43 y.o. male.    Chief Complaint: Headache    HPI  Review of Systems   Constitutional: Negative for chills, fatigue, fever and unexpected weight change.   HENT: Negative for congestion and trouble swallowing.    Eyes: Negative for redness and visual disturbance.   Respiratory: Negative for cough, chest tightness and shortness of breath.    Cardiovascular: Negative for chest pain, palpitations and leg swelling.   Gastrointestinal: Negative for abdominal pain and blood in stool.   Genitourinary: Negative for difficulty urinating and hematuria.   Musculoskeletal: Negative for arthralgias, back pain, gait problem, joint swelling, myalgias and neck pain.   Skin: Negative for color change and rash.   Neurological: Positive for headaches. Negative for tremors, speech difficulty, weakness and numbness.   Hematological: Negative for adenopathy. Does not bruise/bleed easily.   Psychiatric/Behavioral: Negative for behavioral problems, confusion and sleep disturbance. The patient is not nervous/anxious.        Objective:      Physical Exam   Constitutional: He is oriented to person, place, and time. He appears well-developed and well-nourished. No distress.   HENT:   Head: Normocephalic and atraumatic.   Right Ear: Tympanic membrane and ear canal normal.   Left Ear: Tympanic membrane and ear canal normal.   Nose: Nose normal. Right sinus exhibits no maxillary sinus tenderness and no frontal sinus tenderness. Left sinus exhibits no maxillary sinus tenderness and no frontal sinus tenderness.   Mouth/Throat: Uvula is midline, oropharynx is clear and moist and mucous membranes are normal.   Eyes: Conjunctivae are normal. No scleral icterus.   Neck: Normal range of motion. Neck supple. Carotid bruit is not present.   Cardiovascular: Normal rate, regular rhythm, normal heart sounds and intact distal pulses. Exam reveals no gallop and no friction rub.   No murmur  heard.  Pulmonary/Chest: Effort normal and breath sounds normal. No respiratory distress. He has no wheezes. He has no rales.   Abdominal: He exhibits no distension. There is no tenderness.   Musculoskeletal: He exhibits no edema or deformity.        Right lower leg: He exhibits no edema.        Left lower leg: He exhibits no edema.   Lymphadenopathy:     He has no cervical adenopathy.   Neurological: He is alert and oriented to person, place, and time. He displays no tremor. No cranial nerve deficit. Coordination and gait normal. GCS eye subscore is 4. GCS verbal subscore is 5. GCS motor subscore is 6.   Skin: Skin is warm and dry. No rash noted. He is not diaphoretic. No erythema.   Psychiatric: He has a normal mood and affect. His behavior is normal. Judgment and thought content normal.   Nursing note and vitals reviewed.      Assessment:       1. Intractable episodic headache, unspecified headache type        Plan:   Daniel was seen today for headache.    Diagnoses and all orders for this visit:    Intractable episodic headache, unspecified headache type  -     Ambulatory referral to Neurology    Other orders  -     gabapentin (NEURONTIN) 100 MG capsule; Take 1 capsule (100 mg total) by mouth nightly as needed.      See meds, orders, follow up, routing and instructions sections of encounter.  A 43-year-old established male patient.  He is in with a headache.  This time is   three months, but this has been going on for a number of years, described as   right hemiface.  There are no associated neurologic deficits at this time.    Steady in its timing, though the severity increases and decreases, but never to   zero.  It has occurred intermittently.  He had an extensive workup with an event   that occurred in 2007 and has seen an outside neurologist on a couple of   subsequent encounters.  The event in 2007 was different because he had right   hemifacial dysfunction or tingling.  Workup at that time included CT  head,   carotid ultrasound, MRI brain, MRA brain, MRI neck, echocardiogram, all were on   11/13/2007 or 11/14/2007 with a discharge summary for that hospitalization week,   ultimate diagnosis was unclear, possible TIA, possible atypical headache.    RECOMMENDATIONS:  Trial of gabapentin 100 mg at bedtime, trial of magnesium   oxide OTC, Neurology Headache Service referral.  Follow up with me in two months   for his annual physical examination.  Laboratory is currently pending.      MELANIE/ZOË  dd: 04/22/2019 14:03:50 (CDT)  td: 04/23/2019 06:35:32 (CDT)  Doc ID   #[id number]  Job ID #[id number]    CC:

## 2019-04-23 ENCOUNTER — OFFICE VISIT (OUTPATIENT)
Dept: NEUROLOGY | Facility: CLINIC | Age: 44
End: 2019-04-23
Attending: PSYCHIATRY & NEUROLOGY
Payer: COMMERCIAL

## 2019-04-23 ENCOUNTER — PATIENT MESSAGE (OUTPATIENT)
Dept: NEUROLOGY | Facility: CLINIC | Age: 44
End: 2019-04-23

## 2019-04-23 ENCOUNTER — LAB VISIT (OUTPATIENT)
Dept: LAB | Facility: OTHER | Age: 44
End: 2019-04-23
Payer: COMMERCIAL

## 2019-04-23 VITALS — HEIGHT: 77 IN | WEIGHT: 286.19 LBS | BODY MASS INDEX: 33.79 KG/M2

## 2019-04-23 DIAGNOSIS — E78.5 HYPERLIPIDEMIA, UNSPECIFIED HYPERLIPIDEMIA TYPE: ICD-10-CM

## 2019-04-23 DIAGNOSIS — G50.0 TRIGEMINAL NEURALGIA: ICD-10-CM

## 2019-04-23 DIAGNOSIS — M54.31 SCIATICA OF RIGHT SIDE: ICD-10-CM

## 2019-04-23 LAB
25(OH)D3+25(OH)D2 SERPL-MCNC: 20 NG/ML (ref 30–96)
BASOPHILS # BLD AUTO: 0.01 K/UL (ref 0–0.2)
BASOPHILS NFR BLD: 0.2 % (ref 0–1.9)
DIFFERENTIAL METHOD: NORMAL
EOSINOPHIL # BLD AUTO: 0.1 K/UL (ref 0–0.5)
EOSINOPHIL NFR BLD: 2.4 % (ref 0–8)
ERYTHROCYTE [DISTWIDTH] IN BLOOD BY AUTOMATED COUNT: 13.8 % (ref 11.5–14.5)
FERRITIN SERPL-MCNC: 307 NG/ML (ref 20–300)
FOLATE SERPL-MCNC: 9.8 NG/ML (ref 4–24)
HCT VFR BLD AUTO: 42.6 % (ref 40–54)
HGB BLD-MCNC: 14.4 G/DL (ref 14–18)
IRON SERPL-MCNC: 76 UG/DL (ref 45–160)
LYMPHOCYTES # BLD AUTO: 1.8 K/UL (ref 1–4.8)
LYMPHOCYTES NFR BLD: 44.2 % (ref 18–48)
MCH RBC QN AUTO: 28.8 PG (ref 27–31)
MCHC RBC AUTO-ENTMCNC: 33.8 G/DL (ref 32–36)
MCV RBC AUTO: 85 FL (ref 82–98)
MONOCYTES # BLD AUTO: 0.3 K/UL (ref 0.3–1)
MONOCYTES NFR BLD: 6.8 % (ref 4–15)
NEUTROPHILS # BLD AUTO: 1.9 K/UL (ref 1.8–7.7)
NEUTROPHILS NFR BLD: 46.2 % (ref 38–73)
PLATELET # BLD AUTO: 259 K/UL (ref 150–350)
PMV BLD AUTO: 9.5 FL (ref 9.2–12.9)
RBC # BLD AUTO: 5 M/UL (ref 4.6–6.2)
SATURATED IRON: 24 % (ref 20–50)
TOTAL IRON BINDING CAPACITY: 312 UG/DL (ref 250–450)
TRANSFERRIN SERPL-MCNC: 211 MG/DL (ref 200–375)
TSH SERPL DL<=0.005 MIU/L-ACNC: 0.54 UIU/ML (ref 0.4–4)
VIT B12 SERPL-MCNC: 217 PG/ML (ref 210–950)
WBC # BLD AUTO: 4.14 K/UL (ref 3.9–12.7)

## 2019-04-23 PROCEDURE — 99999 PR PBB SHADOW E&M-EST. PATIENT-LVL III: CPT | Mod: PBBFAC,,, | Performed by: PSYCHIATRY & NEUROLOGY

## 2019-04-23 PROCEDURE — 99999 PR PBB SHADOW E&M-EST. PATIENT-LVL III: ICD-10-PCS | Mod: PBBFAC,,, | Performed by: PSYCHIATRY & NEUROLOGY

## 2019-04-23 PROCEDURE — 36415 COLL VENOUS BLD VENIPUNCTURE: CPT

## 2019-04-23 PROCEDURE — 82746 ASSAY OF FOLIC ACID SERUM: CPT

## 2019-04-23 PROCEDURE — 82728 ASSAY OF FERRITIN: CPT

## 2019-04-23 PROCEDURE — 99205 PR OFFICE/OUTPT VISIT, NEW, LEVL V, 60-74 MIN: ICD-10-PCS | Mod: S$GLB,,, | Performed by: PSYCHIATRY & NEUROLOGY

## 2019-04-23 PROCEDURE — 84425 ASSAY OF VITAMIN B-1: CPT

## 2019-04-23 PROCEDURE — 84443 ASSAY THYROID STIM HORMONE: CPT

## 2019-04-23 PROCEDURE — 82607 VITAMIN B-12: CPT

## 2019-04-23 PROCEDURE — 82306 VITAMIN D 25 HYDROXY: CPT

## 2019-04-23 PROCEDURE — 83540 ASSAY OF IRON: CPT

## 2019-04-23 PROCEDURE — 85025 COMPLETE CBC W/AUTO DIFF WBC: CPT

## 2019-04-23 PROCEDURE — 99205 OFFICE O/P NEW HI 60 MIN: CPT | Mod: S$GLB,,, | Performed by: PSYCHIATRY & NEUROLOGY

## 2019-04-23 RX ORDER — DICLOFENAC SODIUM 10 MG/G
2 GEL TOPICAL 4 TIMES DAILY
Qty: 1 TUBE | Refills: 6 | Status: SHIPPED | OUTPATIENT
Start: 2019-04-23 | End: 2019-07-16 | Stop reason: SDUPTHER

## 2019-04-23 NOTE — PATIENT INSTRUCTIONS
Thank you for visiting us at [facility].  You were seen by Dr. Sapna Parra.  You were seen for trigeminal neuralgia.  Please share any feedback about your experience today on [email or web address] and/or [email or web address].  We appreciate the privilege to participate in your healthcare.  We will be recommending the following:  Please message in 4-6 weeks with an update    Trigeminal Neuralgia  -B12/folate, TSH, Vitamin D,  iron panel  -sleep/wake cycle:   -sleep hygiene   -melatonin 3mg at night as needed (OTC)  -headaches:   -migraine journal   -magnesium oxide 400mg daily   -diclofenac gel 1% 3-4 times as needed  -aroma therapy  -procedure:   Trigeminal Nerve block, deferred    cholesterol: brainhealth, life style modifications    Sciatica:  -We have recommended physical therapy for you.  Please contact [phone] to schedule.  - NSAIDS as needed    -Brain Health lifestyle modifications:    -sleep hygiene   - diet: Mediterranean Diet    -exercise: 20-30 minutes of  Moderate exercise 3-5 times a week   -stress management reviewed   -smoking cessation, alcohol moderation    Check out my blog post for further information:  [email or web address]      Sleep Hygiene:    1. Avoid watching TV, eating, and discussing emotional issues in bed. The bed should be used for sleep and sex only. If not, we can associate the bed with other activities and it often becomes difficult to fall asleep.  2. Minimize noise, light, and temperature extremes during sleep with ear plugs, window blinds, or an electric blanket or air conditioner. Even the slightest nighttime noises or luminescent lights can disrupt the quality of your sleep. Try to keep your bedroom at a comfortable temperature -- not too hot (above 75 degrees) or too cold (below 54 degrees).  3. Try not to drink fluids after 8 p.m. This may reduce awakenings due to urination.  4. Avoid naps, but if you do  nap, make it no more than about 25 minutes about eight hours after you awake. But if you have problems falling asleep, then no naps for you.  5. Do not expose your self to bright light if you need to get up at night. Use a small night-light instead.  6. Nicotine is a stimulant and should be avoided particularly near bedtime and upon night awakenings. Having a smoke before bed, although it may feel relaxing, is actually putting a stimulant into your bloodstream.  7. Caffeine is also a stimulant and is present in coffee (100-200 mg), soda (50-75 mg), tea (50-75 mg), and various over-the-counter medications. Caffeine should be discontinued at least four to six hours before bedtime. If you consume large amounts of caffeine and you cut your self off too quickly, beware; you may get headaches that could keep you awake.  8. Although alcohol is a depressant and may help you fall asleep, the subsequent metabolism that clears it from your body when you are sleeping causes a withdrawal syndrome. This withdrawal causes awakenings and is often associated with nightmares and sweats.  9. A light snack may be sleep-inducing, but a heavy meal too close to bedtime interferes with sleep. Stay away from protein and stick to carbohydrates or dairy products. Milk contains the amino acid L-tryptophan, which has been shown in research to help people go to sleep. So milk and cookies or crackers (without chocolate) may be useful and taste good as well.    Check out my work here at [facility] at the intersection of Healthcare, 3D printing, Virtual and Augmented Reality:  [email or web address]        You may receive a survey about your experience at [facility].  We appreciate you taking the time to complete the survey to help us improve our service and care.

## 2019-04-23 NOTE — PROGRESS NOTES
Subjective:       Patient ID: Daniel Garcias is a 43 y.o. male.    Chief Complaint:  Consult (Headaches on right side)      History of Present Illness    43 RH AAM w/ HLP o/w healthy who presents for headache.  Pt presented to [facility] ED in 2007 for R sided facial tingling.  Pt was evaluated with MRI and LP all of which was unremarkable.  Since then pt has had intermittent headaches.  It is always right sided that usually starts as a sharp radiating pain localizes to right temple and occiput..  It then evolves into a dull bothersome pain.  He rates it as 5/10.   Pt denies aura or triggers.  He has flares 2-3 times a year  He denies photo/phonophobia, n/v, slurred speech, vision, weakness, hearing.   NSAIDs help mild to moderate.    Pt has been evaluated by [facility] neurologists previously.   No medicine or procedures.  Of note, pt also notes sciatic pain in Right leg.  He endorses chronic.  He denies weakness or numbness.  No incontinence.  Pt notes good sleep. No snoring .  Mood is OK.  Denies SI/HI.         No past medical history on file.    Past Surgical History:   Procedure Laterality Date    COLONOSCOPY golytely N/A 4/11/2018    Performed by Deborah Gamino MD at [facility]    KNEE ARTHROSCOPY W/ ACL RECONSTRUCTION      left    KNEE ARTHROSCOPY W/ MENISCAL REPAIR  1998    left       Family History   Problem Relation Age of Onset    Hypertension Mother     Hyperlipidemia Father     Cancer Maternal Grandmother         colon       Social History     Socioeconomic History    Marital status:      Spouse name: Not on file    Number of children: 3    Years of education: Not on file    Highest education level: Not on file   Occupational History    Occupation: Operations   Social Needs    Financial resource strain: Not on file    Food insecurity:     Worry: Not on file     Inability: Not on file    Transportation needs:     Medical: Not on file     Non-medical: Not on file   Tobacco Use    Smoking  status: Never Smoker    Smokeless tobacco: Never Used   Substance and Sexual Activity    Alcohol use: No    Drug use: No    Sexual activity: Yes     Partners: Female   Lifestyle    Physical activity:     Days per week: Not on file     Minutes per session: Not on file    Stress: Not on file   Relationships    Social connections:     Talks on phone: Not on file     Gets together: Not on file     Attends [employer]ism service: Not on file     Active member of club or organization: Not on file     Attends meetings of clubs or organizations: Not on file     Relationship status: Not on file   Other Topics Concern    Not on file   Social History Narrative    Not on file         Current Outpatient Medications:     gabapentin (NEURONTIN) 100 MG capsule, Take 1 capsule (100 mg total) by mouth nightly as needed., Disp: 90 capsule, Rfl: 0    Review of Systems  Review of Systems   Constitutional: Negative for chills and fever.   HENT: Negative for hearing loss and tinnitus.    Respiratory: Negative for cough.    Cardiovascular: Negative for chest pain and palpitations.   Gastrointestinal: Negative for nausea and vomiting.   Musculoskeletal: Positive for back pain. Negative for falls, joint pain, myalgias and neck pain.   Neurological: Positive for tingling and headaches. Negative for dizziness, tremors, sensory change, speech change, focal weakness, seizures, loss of consciousness and weakness.   Psychiatric/Behavioral: Negative for depression, hallucinations, memory loss, substance abuse and suicidal ideas. The patient is not nervous/anxious and does not have insomnia.        Objective:   There were no vitals filed for this visit.   Physical Exam      Constitutional: male appears well-developed and well-nourished.   HENT:   Head: Normocephalic and atraumatic.   Neck and spine: Normal range of motion. Neck supple. No TTP in cervical, thoracic, and lumbar spine  Cardiovascular: Normal rate, regular rhythm and normal heart  sounds.    Pulmonary/Chest: Effort normal and breath sounds normal.   Abdominal: Soft. Bowel sounds are normal.   Skin: Skin is warm.   Ext: No c/c/e noted    The patient is awake, attentive, Alert, oriented to person, place and time.  Language is intact to comprehension, repetition, and production  Able to follow multi-step commands  No findings to suggest executive dysfuntion    Patient has adequate insight    Mood is stable    Funduscopic exam - disc were flat and pink, no papilledema bilaterally and posterior segments show no vessel changes, exudates, or hemorrhages  Pursuits were smooth, normal saccades, no nystagmus bilaterally  PERRL, VFFC, EOMI, sensation is diminished V1-V3 on R     Motor - facial movement was symmetrical and normal, no facial droop seen.   hearing grossly intact  Palate moved well and was symmetrical with normal palatal and oral sensation  Tongue protrudes midline and movements were full  Traps raise b/l equally    Normal tone.  No spasticity, cogwheel rigidity  Normal bulk. No pronator drift                        Right      Left  Deltoid            5          5  Biceps            5          5  Triceps           5          5                   5          5    Hip Flex            5          5  Hip Ext             5          5  Knee Flex         5          5  Knee Ext          5          5  Plantar Flexion  5          5  Dorsiflexion       5          5    No tremor noted    Reflexes normal and symmteric in bl upper and lower extremities, including biceps, triceps, and patellar    Sensory:  Light touch:  intact    Coordination:  F-to-N:  intact    Rapid-alt movements:  Normal amplitude and frequency     Romberg Sign:  negative  General gait:   Normal arm swing and turns. Normal postural reflexes.   Tandem gait:  intact      Results for BLOSSOM ALCANTARA (MRN [id number]) as of 4/23/2019 12:38   Ref. Range 11/19/2007 15:16   Heme Aliquot Latest Units: ml 1.5   Appearance, CSF Unknown Bloody    WBC, CSF Latest Ref Range: 0 - 5 /CuMm 4   RBC, CSF Latest Ref Range: 0 - 5 /CuMm 2,750 (A)   Segmented Neutrophils, CSF Latest Ref Range: 0 - 6 % 90 (H)   Lymphs, CSF Latest Ref Range: 0 - 80 % 10   Glucose, CSF Latest Ref Range: 40 - 70 mg/dl 52   Protein, CSF Latest Ref Range: 12 - 60 mg/dl 39   VDRL, CSF Latest Ref Range: Non-Reactive  Non-Reactive     Results for BLOSSOM ALCANTARA (MRN [id number]) as of 4/23/2019 12:38   Ref. Range 6/4/2018 09:33   Cholesterol Latest Ref Range: 120 - 199 mg/dL 250 (H)   HDL Latest Ref Range: 40 - 75 mg/dL 47   HDL/Chol Ratio Latest Ref Range: 20.0 - 50.0 % 18.8 (L)   LDL Cholesterol Latest Ref Range: 63.0 - 159.0 mg/dL 185.0 (H)   Non-HDL Cholesterol Latest Units: mg/dL 203   Total Cholesterol/HDL Ratio Latest Ref Range: 2.0 - 5.0  5.3 (H)   Triglycerides Latest Ref Range: 30 - 150 mg/dL 90           TSH   Date/Time Value Ref Range Status   11/19/2007 04:35 AM 1.5 0.4 - 4.0 uIU/ml Final   11/14/2007 05:45 AM 0.94 0.4 - 4.0 uIU/ml Final     No results found for this or any previous visit.  Assessment:     Problem List Items Addressed This Visit        Neuro    Trigeminal neuralgia       Orthopedic    Sciatica of right side    Current Assessment & Plan     See below                No diagnosis found.    43 RH AAM w/ HLP,  o/w healthy who presents for headache.  History is notable for 2-3 annual flares of R sided tingling and pain since 2007.  Exam is notable for WDWN AAM with intact cervical range of motion.  Neuro exam is notable for normal fundus exam, EOMI, PERRL, VFFC, no nystagmys, diminished R sided sensation, LT intact, LEAL, negative romberg, intact gait.   Workup is notable abnormal FLP, previously WNL CSF, reportedly normal neuro-imaging in the past  Pt's presentation is c/w trigeminal neuralgia.   Also concern for sciatica.       Plan:       Trigeminal Neuralgia  -B12/folate, TSH, Vitamin D, B2/B6, iron panel  -will consider vascular imaging, if persists or  worsesn  -sleep/wake cycle:   -sleep hygiene   -melatonin 3mg at night,   -headaches:   -migraine journal   -magnesium oxide 400mg daily, defer gabapentin   -diclofenac gel 1% 3-4 times as needed  -aroma therapy  -procedure:   Trigeminal Nerve block, deferred    HLP: brainhealth, life style modifications    Sciatica: PT, NSAIDS PRN          Sapna Parra MD  Neurologist  B[facility] Injury [facility]     Focused examination was undertaken today.  I spent 60 minutes with the patient  total face to face with the patient.  >50% of that time was spent on counseling regarding his symptoms, review of diagnostics, and building and coordinating a treatment plan based on the combination of results and symptoms.   Questions were sought and answered to her stated verbal satisfaction.

## 2019-04-26 LAB — VIT B1 BLD-MCNC: 46 UG/L (ref 38–122)

## 2019-05-08 ENCOUNTER — CLINICAL SUPPORT (OUTPATIENT)
Dept: REHABILITATION | Facility: HOSPITAL | Age: 44
End: 2019-05-08
Attending: PSYCHIATRY & NEUROLOGY
Payer: COMMERCIAL

## 2019-05-08 DIAGNOSIS — M54.41 CHRONIC BILATERAL LOW BACK PAIN WITH RIGHT-SIDED SCIATICA: ICD-10-CM

## 2019-05-08 DIAGNOSIS — G89.29 CHRONIC BILATERAL LOW BACK PAIN WITH RIGHT-SIDED SCIATICA: ICD-10-CM

## 2019-05-08 PROCEDURE — 97110 THERAPEUTIC EXERCISES: CPT | Mod: PO

## 2019-05-08 PROCEDURE — 97161 PT EVAL LOW COMPLEX 20 MIN: CPT | Mod: PO

## 2019-05-10 PROBLEM — M54.41 CHRONIC BILATERAL LOW BACK PAIN WITH RIGHT-SIDED SCIATICA: Status: ACTIVE | Noted: 2019-05-10

## 2019-05-10 PROBLEM — G89.29 CHRONIC BILATERAL LOW BACK PAIN WITH RIGHT-SIDED SCIATICA: Status: ACTIVE | Noted: 2019-05-10

## 2019-05-10 NOTE — PLAN OF CARE
[facility]  Physical Therapy Initial Evaluation    Name: Daniel Garcias  Clinic Number: [id number]    Therapy Diagnosis:   Encounter Diagnosis   Name Primary?    Chronic bilateral low back pain with right-sided sciatica      Physician: Sapna Parra MD    Physician Orders: PT Eval and Treat low back pain w/ R sided radiculopathy  Medical Diagnosis from Referral: Sciatica of R side  Evaluation Date: 5/8/2019  Authorization Period Expiration: 12/31/19  Plan of Care Expiration: 7/21/19  Visit # / Visits authorized: 1/ 30    Time In: 2:15 (Pt w/ late arrival)  Time Out: 3:00  Total Billable Time: 45 minutes    Precautions: Standard    Subjective   Date of onset: 3 years ago  History of current condition - Daniel reports: insidious onset o flow back pain about 3 years ago. Pt reports at that time he was working in a more active roll with [employer], getting in and out of cars, in and out of the bucket and was climbing a lot of towers. Pt reports he still works at [employer] but now has a less physically active roll. Pt reports he generally feels good while at the gym but following the gym he has an increase in pain. Pt reports he has radiating pain down his R LE into his foot. Pt reports he has tried chiropractor/ ice which were a little helpful but no major relief. Pt reports he is s/p L ACL/MCL reconstruction about 20 years ago which he feels he is still compensating at the gym d/t lack strength and ROM. Pt would like to be able to independently manage sx, perform all job responsibilities without pain and understand what to do at the gym.     No past medical history on file.  Daniel Garcias  has a past surgical history that includes Knee arthroscopy w/ meniscal repair (1998); Knee arthroscopy w/ ACL reconstruction; and Colonoscopy (N/A, 4/11/2018).    Daniel has a current medication list which includes the following prescription(s): diclofenac sodium and gabapentin.    Review of patient's  allergies indicates:   Allergen Reactions    Shellfish containing products Hives        Imaging, MRI studies: 2 years ago revealed slight bulge    Prior Therapy: following MCL/ACL repair  Social History: Pt lives with family  Occupation: Pt works at [employer]  Prior Level of Function: Pt independent with all ADLs, job responsibilities and participating in regular physical activity  Current Level of Function: Pt limited with all ADLs, job responsibilities and unable to tolerate static positioning     Pain:  Current 2/10, worst 7/10, best 2/10   Location: right back  and leg   Description: Aching, Dull, Burning, Sharp and Shooting  Aggravating Factors: Sitting, Standing, Bending, Walking, Morning, Extension and Getting out of bed/chair  Easing Factors: massage, ice and rest    Pts goals: sx relief, tolerate static positioning, perform all job responsibilities/ ADLs w/out pain    Objective     LUMBAR SPINE AROM:   Flexion: 75% P! In low back when coming back up   Extension: 50%    Left Sidebend: WNL P! In low back   Right Sidebend: WNL   Left Rotation: WNL   Right Rotation: WNL     SEGMENTAL MOBILITY: PA glides to lumbar spine negative for pain/ radiating sx    LOWER EXTREMITY: restricted throughout*  *difficult to assess d/t jeans restriction motion as well    LOWER EXTREMITY STRENGTH:   Left Right   Quadriceps 5/5 5/5   Hamstrings 5/5 5/5     Iliopsoas 5/5 5/5   PGM 4/5 4/5   Hip IR 4/5 P! 5/5   Hip ER 5/5 5/5   Hip Ext 4/5 4/5     Dermatomes: Sensation: Light Touch: Intact  Myotomes: WNL  Flexibility:restricted    Special Tests:   Left Right   Slump (-) (+)   SLR (-) (-)     TREATMENT   Treatment Time In: 2:45  Treatment Time Out: 3:00  Total Treatment time separate from Evaluation: 15 minutes    Daniel received therapeutic exercises to develop strength, endurance, ROM, flexibility, posture and core stabilization for 15 minutes including:    Pelvic tilt 2x20  Prone on elbows 1-5 min as tolerated  Prone press ups  2x10  Prone hip extension 2x10z    Home Exercises and Patient Education Provided    Education provided re: posture, activity modification, extension postures     Written Home Exercises Provided: All exercises performed during today's treatment were printed and given to pt  Exercises were reviewed and Daniel was able to demonstrate them prior to the end of the session.   Pt received a written copy of exercises to perform at home. Daniel demonstrated good  understanding of the education provided.     Assessment   Daniel is a 43 y.o. male referred to outpatient Physical Therapy with a medical diagnosis of low back pain w/ R side radiculopathy. Pt presents with decreased strength, decreased ROM, decreased flexibility, faulty posture, increased neural tension, and increased pain. Due to impairments, pt is unable to perform all job responsibilities or exercise at [facility].     Pt prognosis is Excellent.   Pt will benefit from skilled outpatient Physical Therapy to address the deficits stated above and in the chart below, provide pt/family education, and to maximize pt's level of independence.     Plan of care discussed with patient: Yes  Pt's spiritual, cultural and educational needs considered and patient is agreeable to the plan of care and goals as stated below:     Anticipated Barriers for therapy: n/a    Medical Necessity is demonstrated by the following  History  Co-morbidities and personal factors that may impact the plan of care Co-morbidities:   hyperlipidemia    Personal Factors:   no deficits     low   Examination  Body Structures and Functions, activity limitations and participation restrictions that may impact the plan of care Body Regions:   back  lower extremities    Body Systems:    ROM  strength  gait    Participation Restrictions:   Gym routine    Activity limitations:   Learning and applying knowledge  no deficits    General Tasks and Commands  no deficits    Communication  no deficits    Mobility  lifting and  carrying objects  walking  driving (bike, car, motorcycle)    Self care  no deficits    Domestic Life  shopping  cooking  doing house work (cleaning house, washing dishes, laundry)    Interactions/Relationships  no deficits    Life Areas  employment    Community and Social Life  no deficits         low   Clinical Presentation stable and uncomplicated low   Decision Making/ Complexity Score: low     Goals:  Short Term Goals: 3 weeks   1. Pt will be independent with HEP and report compliance at least 4 days/week  2. Pt will be able to tolerate 4 hrs of work, taking stretch breaks and making modifications when necessary reporting less than 4/10 pain  3. Pt will be able to sit for 30 minutes (taking stretch breaks between bouts) reporting less than 2/10 LE pain    Long Term Goals: 10 weeks   1. Pt will be able to perform gym routine at [facility] (and understand what movements to avoid) reporting less than 2/10 pain  2. Pt will be able to perform all job responsibilities at [facility] reporting less than 2/10 pain  3. Pt will be able to perform all ADLs at [facility] reporting less than 2/10 pain    Plan   Plan of care Certification: 5/8/2019 to 7/21/19.    Outpatient Physical Therapy 2 times weekly for 10 weeks to include the following interventions: Cervical/Lumbar Traction, Electrical Stimulation TENS, Gait Training, Manual Therapy, Moist Heat/ Ice, Neuromuscular Re-ed, Patient Education, Self Care, Therapeutic Activites, Therapeutic Exercise, Ultrasound and Dry Needling.     Leonardo Larson, PT

## 2019-06-24 ENCOUNTER — OFFICE VISIT (OUTPATIENT)
Dept: INTERNAL MEDICINE | Facility: CLINIC | Age: 44
End: 2019-06-24
Attending: FAMILY MEDICINE
Payer: COMMERCIAL

## 2019-06-24 VITALS
OXYGEN SATURATION: 98 % | HEART RATE: 55 BPM | TEMPERATURE: 98 F | DIASTOLIC BLOOD PRESSURE: 70 MMHG | HEIGHT: 76 IN | SYSTOLIC BLOOD PRESSURE: 124 MMHG | WEIGHT: 281.63 LBS | BODY MASS INDEX: 34.3 KG/M2

## 2019-06-24 DIAGNOSIS — Z00.00 ANNUAL PHYSICAL EXAM: Primary | ICD-10-CM

## 2019-06-24 DIAGNOSIS — G50.0 TRIGEMINAL NEURALGIA: ICD-10-CM

## 2019-06-24 DIAGNOSIS — Z98.890 HISTORY OF REPAIR OF ACL: ICD-10-CM

## 2019-06-24 DIAGNOSIS — E55.9 VITAMIN D DEFICIENCY: ICD-10-CM

## 2019-06-24 DIAGNOSIS — Z12.5 PROSTATE CANCER SCREENING: ICD-10-CM

## 2019-06-24 DIAGNOSIS — E78.5 HYPERLIPIDEMIA, UNSPECIFIED HYPERLIPIDEMIA TYPE: ICD-10-CM

## 2019-06-24 PROCEDURE — 99999 PR PBB SHADOW E&M-EST. PATIENT-LVL III: CPT | Mod: PBBFAC,,, | Performed by: FAMILY MEDICINE

## 2019-06-24 PROCEDURE — 99396 PREV VISIT EST AGE 40-64: CPT | Mod: S$GLB,,, | Performed by: FAMILY MEDICINE

## 2019-06-24 PROCEDURE — 99396 PR PREVENTIVE VISIT,EST,40-64: ICD-10-PCS | Mod: S$GLB,,, | Performed by: FAMILY MEDICINE

## 2019-06-24 PROCEDURE — 99999 PR PBB SHADOW E&M-EST. PATIENT-LVL III: ICD-10-PCS | Mod: PBBFAC,,, | Performed by: FAMILY MEDICINE

## 2019-06-24 NOTE — PROGRESS NOTES
Subjective:       Patient ID: Daniel Garcias is a 43 y.o. male.    Chief Complaint: Follow-up    Established patient for an annual wellness check/physical exam and also chronic disease management. Specific complaints - see dictation and please see ROS.  P, S, Fm, Soc Hx's; Meds, allergies reviewed and reconciled.  Health maintenance file reviewed and addressed items due.    Review of Systems   Constitutional: Negative for chills, fatigue, fever and unexpected weight change.   HENT: Negative for congestion and trouble swallowing.    Eyes: Negative for redness and visual disturbance.   Respiratory: Negative for cough, chest tightness and shortness of breath.    Cardiovascular: Negative for chest pain, palpitations and leg swelling.   Gastrointestinal: Negative for abdominal pain and blood in stool.   Genitourinary: Negative for difficulty urinating and hematuria.   Musculoskeletal: Negative for arthralgias, back pain, gait problem, joint swelling, myalgias and neck pain.   Skin: Negative for color change and rash.   Neurological: Negative for tremors, speech difficulty, weakness, numbness and headaches.   Hematological: Negative for adenopathy. Does not bruise/bleed easily.   Psychiatric/Behavioral: Negative for behavioral problems, confusion and sleep disturbance. The patient is not nervous/anxious.        Objective:      Physical Exam   Constitutional: He appears well-developed and well-nourished.   HENT:   Head: Normocephalic.   Right Ear: Tympanic membrane, external ear and ear canal normal.   Left Ear: Tympanic membrane, external ear and ear canal normal.   Mouth/Throat: Oropharynx is clear and moist.   Eyes: Pupils are equal, round, and reactive to light.   Neck: Normal range of motion. Neck supple. Carotid bruit is not present. No thyromegaly present.   Cardiovascular: Normal rate, regular rhythm, normal heart sounds and intact distal pulses. Exam reveals no gallop and no friction rub.   No murmur  heard.  Pulmonary/Chest: Effort normal and breath sounds normal.   Abdominal: Soft. He exhibits no distension and no mass. There is no tenderness. There is no rebound and no guarding.   Musculoskeletal: Normal range of motion. He exhibits no edema or tenderness.   Lymphadenopathy:     He has no cervical adenopathy.   Neurological: He is alert. He has normal strength. He displays normal reflexes. No cranial nerve deficit or sensory deficit. Coordination and gait normal.   Skin: Skin is warm and dry.   Psychiatric: He has a normal mood and affect. His behavior is normal. Judgment and thought content normal.   Nursing note and vitals reviewed.      Assessment:       1. Annual physical exam    2. Trigeminal neuralgia    3. Hyperlipidemia, unspecified hyperlipidemia type    4. Prostate cancer screening    5. Vitamin D deficiency    6. History of repair of ACL - 1994, meniscus 2007        Plan:     Medication List with Changes/Refills   Current Medications    DICLOFENAC SODIUM (VOLTAREN) 1 % GEL    Apply 2 g topically 4 (four) times daily. As needed to areas of scalp, head and neck that are in pain   Discontinued Medications    GABAPENTIN (NEURONTIN) 100 MG CAPSULE    Take 1 capsule (100 mg total) by mouth nightly as needed.     Daniel was seen today for follow-up.    Diagnoses and all orders for this visit:    Annual physical exam  -     Comprehensive metabolic panel; Standing  -     Lipid panel; Standing  -     PSA, Screening; Standing    Trigeminal neuralgia    Hyperlipidemia, unspecified hyperlipidemia type    Prostate cancer screening  -     PSA, Screening; Standing    Vitamin D deficiency    History of repair of ACL - 1994, meniscus 2007      See meds, orders, follow up, routing and instructions sections of encounter.  A 43-year-old established male patient.  He is in for a physical today.  He had   a recent evaluation for headaches and a subsequent evaluation by our neurologist   who felt he had a likely trigeminal  neuralgia.  His health maintenance was   updated today.  He is due for laboratory and he has no other new complaints.  He   does exercise on a regular basis.      MELANIE/IN  dd: 06/24/2019 11:10:00 (CDT)  td: 06/24/2019 20:13:50 (CDT)  Doc ID   #[id number]  Job ID #[id number]    CC:

## 2019-07-07 ENCOUNTER — PATIENT MESSAGE (OUTPATIENT)
Dept: INTERNAL MEDICINE | Facility: CLINIC | Age: 44
End: 2019-07-07

## 2019-07-07 DIAGNOSIS — E78.5 HYPERLIPIDEMIA, UNSPECIFIED HYPERLIPIDEMIA TYPE: Primary | ICD-10-CM

## 2019-07-09 RX ORDER — ATORVASTATIN CALCIUM 10 MG/1
10 TABLET, FILM COATED ORAL DAILY
Qty: 90 TABLET | Refills: 3 | Status: SHIPPED | OUTPATIENT
Start: 2019-07-09 | End: 2020-07-13

## 2019-07-12 ENCOUNTER — PATIENT OUTREACH (OUTPATIENT)
Dept: ADMINISTRATIVE | Facility: OTHER | Age: 44
End: 2019-07-12

## 2019-07-16 ENCOUNTER — OFFICE VISIT (OUTPATIENT)
Dept: NEUROLOGY | Facility: CLINIC | Age: 44
End: 2019-07-16
Attending: PSYCHIATRY & NEUROLOGY
Payer: COMMERCIAL

## 2019-07-16 VITALS
SYSTOLIC BLOOD PRESSURE: 192 MMHG | HEIGHT: 76 IN | WEIGHT: 289.44 LBS | HEART RATE: 53 BPM | BODY MASS INDEX: 35.25 KG/M2 | DIASTOLIC BLOOD PRESSURE: 86 MMHG

## 2019-07-16 DIAGNOSIS — M54.31 SCIATICA OF RIGHT SIDE: ICD-10-CM

## 2019-07-16 DIAGNOSIS — E78.2 MIXED HYPERLIPIDEMIA: ICD-10-CM

## 2019-07-16 DIAGNOSIS — E78.5 HYPERLIPIDEMIA, UNSPECIFIED HYPERLIPIDEMIA TYPE: ICD-10-CM

## 2019-07-16 DIAGNOSIS — M54.41 CHRONIC BILATERAL LOW BACK PAIN WITH RIGHT-SIDED SCIATICA: ICD-10-CM

## 2019-07-16 DIAGNOSIS — G50.0 TRIGEMINAL NEURALGIA: ICD-10-CM

## 2019-07-16 DIAGNOSIS — G89.29 CHRONIC BILATERAL LOW BACK PAIN WITH RIGHT-SIDED SCIATICA: ICD-10-CM

## 2019-07-16 PROCEDURE — 99214 PR OFFICE/OUTPT VISIT, EST, LEVL IV, 30-39 MIN: ICD-10-PCS | Mod: S$GLB,,, | Performed by: PSYCHIATRY & NEUROLOGY

## 2019-07-16 PROCEDURE — 99999 PR PBB SHADOW E&M-EST. PATIENT-LVL III: ICD-10-PCS | Mod: PBBFAC,,, | Performed by: PSYCHIATRY & NEUROLOGY

## 2019-07-16 PROCEDURE — 99214 OFFICE O/P EST MOD 30 MIN: CPT | Mod: S$GLB,,, | Performed by: PSYCHIATRY & NEUROLOGY

## 2019-07-16 PROCEDURE — 99999 PR PBB SHADOW E&M-EST. PATIENT-LVL III: CPT | Mod: PBBFAC,,, | Performed by: PSYCHIATRY & NEUROLOGY

## 2019-07-16 RX ORDER — DICLOFENAC SODIUM 10 MG/G
2 GEL TOPICAL 4 TIMES DAILY
Qty: 1 TUBE | Refills: 6 | Status: SHIPPED | OUTPATIENT
Start: 2019-07-16 | End: 2021-02-11 | Stop reason: ALTCHOICE

## 2019-07-16 NOTE — PROGRESS NOTES
Subjective:       Patient ID: Daniel Garcias is a 43 y.o. male.    Chief Complaint:  Headache (3 month f/u)      History of Present Illness    43 RH AAM w/ HLP o/w healthy who presents for headache.  Pt was last seen on 4/23/19 and at that time we recommended:  Trigeminal Neuralgia  -B12/folate, TSH, Vitamin D, B2/B6, iron panel  -will consider vascular imaging, if persists or worsesn  -sleep/wake cycle:   -sleep hygiene   -melatonin 3mg at night,   -headaches:   -migraine journal   -magnesium oxide 400mg daily, defer gabapentin   -diclofenac gel 1% 3-4 times as needed  -aroma therapy  -procedure:   Trigeminal Nerve block, deferred    HLP: brainhealth, life style modifications    In the interim, pt has been relatively stable.  He had an episode of R temple sharp pain that lasted for minutes and spontaneously resolved.  Did not  diclofenac gel.  Adherent to Vitamin D.  Sleep is 'OK'.  No issues falling or staying asleep.  No snoring.  Has not tried melatonin.  Back pain is stable. He does endorse 2 times a week radiating, tingling pain.   No weakness, muscle loss, saddle anesthesia, urinary/bowel/sexual dysfunction.      Initial HPI (4/23/2019)  Pt presented to [facility] [place] [place] ED in 2007 for R sided facial tingling.  Pt was evaluated with MRI and LP all of which was unremarkable.  Since then pt has had intermittent headaches.  It is always right sided that usually starts as a sharp radiating pain localizes to right temple and occiput..  It then evolves into a dull bothersome pain.  He rates it as 5/10.   Pt denies aura or triggers.  He has flares 2-3 times a year  He denies photo/phonophobia, n/v, slurred speech, vision, weakness, hearing.   NSAIDs help mild to moderate.    Pt has been evaluated by [facility] neurologists previously.   No medicine or procedures.  Of note, pt also notes sciatic pain in Right leg.  He endorses chronic.  He denies weakness or numbness.  No incontinence.  Pt notes good sleep. No snoring .  Mood  is OK.  Denies SI/HI.         No past medical history on file.    Past Surgical History:   Procedure Laterality Date    COLONOSCOPY golytely N/A 4/11/2018    Performed by Deborah Gamino [facility] at [facility]    KNEE ARTHROSCOPY W/ ACL RECONSTRUCTION      left    KNEE ARTHROSCOPY W/ MENISCAL REPAIR  1998    left       Family History   Problem Relation Age of Onset    Hypertension Mother     Hyperlipidemia Father     Cancer Maternal Grandmother         colon       Social History     Socioeconomic History    Marital status:      Spouse name: Not on file    Number of children: 3    Years of education: Not on file    Highest education level: Not on file   Occupational History    Occupation: Operations   Social Needs    Financial resource strain: Not on file    Food insecurity:     Worry: Not on file     Inability: Not on file    Transportation needs:     Medical: Not on file     Non-medical: Not on file   Tobacco Use    Smoking status: Never Smoker    Smokeless tobacco: Never Used   Substance and Sexual Activity    Alcohol use: No    Drug use: No    Sexual activity: Yes     Partners: Female   Lifestyle    Physical activity:     Days per week: Not on file     Minutes per session: Not on file    Stress: Not on file   Relationships    Social connections:     Talks on phone: Not on file     Gets together: Not on file     Attends Jehovah's witness service: Not on file     Active member of club or organization: Not on file     Attends meetings of clubs or organizations: Not on file     Relationship status: Not on file   Other Topics Concern    Not on file   Social History Narrative    Not on file         Current Outpatient Medications:     atorvastatin (LIPITOR) 10 MG tablet, Take 1 tablet (10 mg total) by mouth once daily., Disp: 90 tablet, Rfl: 3    diclofenac sodium (VOLTAREN) 1 % Gel, Apply 2 g topically 4 (four) times daily. As needed to areas of scalp, head and neck that are in pain, Disp: 1  Tube, Rfl: 6    Review of Systems  Review of Systems   Constitutional: Negative for chills, fever and weight loss.   HENT: Negative for hearing loss and tinnitus.    Respiratory: Negative for cough.    Cardiovascular: Negative for chest pain and palpitations.   Gastrointestinal: Negative for nausea and vomiting.   Musculoskeletal: Positive for back pain. Negative for falls, joint pain, myalgias and neck pain.   Neurological: Positive for tingling. Negative for dizziness, tremors, sensory change, speech change, focal weakness, seizures, loss of consciousness, weakness and headaches.   Psychiatric/Behavioral: Negative for depression, hallucinations, memory loss, substance abuse and suicidal ideas. The patient is not nervous/anxious and does not have insomnia.        Objective:     Vitals:    07/16/19 0910   BP: (!) 192/86   Pulse: (!) 53      Physical Exam      Constitutional: male appears well-developed and well-nourished.   HENT:   Head: Normocephalic and atraumatic.   Neck and spine: Normal range of motion. Neck supple. No TTP in cervical, thoracic, and lumbar spine  Cardiovascular: Normal rate, regular rhythm and normal heart sounds.    Pulmonary/Chest: Effort normal and breath sounds normal.   Abdominal: Soft. Bowel sounds are normal.   Skin: Skin is warm.   Ext: No c/c/e noted    The patient is awake, attentive, Alert, oriented to person, place and time.  Language is intact to comprehension, repetition, and production  Able to follow multi-step commands  No findings to suggest executive dysfuntion    Patient has adequate insight    Mood is stable    Funduscopic exam - disc were flat and pink, no papilledema bilaterally and posterior segments show no vessel changes, exudates, or hemorrhages  Pursuits were smooth, normal saccades, no nystagmus bilaterally  PERRL, VFFC, EOMI, sensation is intact V1-V3 on R to LT and PP    Motor - facial movement was symmetrical and normal, no facial droop seen.   hearing grossly  intact  Palate moved well and was symmetrical with normal palatal and oral sensation  Tongue protrudes midline and movements were full  Traps raise b/l equally    Normal tone.  No spasticity, cogwheel rigidity  Normal bulk. No pronator drift                        Right      Left  Deltoid            5          5  Biceps            5          5  Triceps           5          5                   5          5    Hip Flex            5          5  Hip Ext             5          5  Knee Flex         5          5  Knee Ext          5          5  Plantar Flexion  5          5  Dorsiflexion       5          5    No tremor noted    Reflexes brisk in RLE(patellar, cross-adductors)  And o/w symmteric in bl upper and lower extremities, including biceps, triceps, and patellar    Sensory:  Light touch:  intact    Coordination:  F-to-N:  intact    Rapid-alt movements:  Normal amplitude and frequency     Romberg Sign:  negative  General gait:   Normal arm swing and turns. Normal postural reflexes.   Tandem gait:  intact      Results for BLOSSOM ALCANTARA (MRN [id number]) as of 4/23/2019 12:38   Ref. Range 11/19/2007 15:16   Heme Aliquot Latest Units: ml 1.5   Appearance, CSF Unknown Bloody   WBC, CSF Latest Ref Range: 0 - 5 /CuMm 4   RBC, CSF Latest Ref Range: 0 - 5 /CuMm 2,750 (A)   Segmented Neutrophils, CSF Latest Ref Range: 0 - 6 % 90 (H)   Lymphs, CSF Latest Ref Range: 0 - 80 % 10   Glucose, CSF Latest Ref Range: 40 - 70 mg/dl 52   Protein, CSF Latest Ref Range: 12 - 60 mg/dl 39   VDRL, CSF Latest Ref Range: Non-Reactive  Non-Reactive     Results for BLOSSOM ALCANTARA (MRN [id number]) as of 4/23/2019 12:38   Ref. Range 6/4/2018 09:33   Cholesterol Latest Ref Range: 120 - 199 mg/dL 250 (H)   HDL Latest Ref Range: 40 - 75 mg/dL 47   HDL/Chol Ratio Latest Ref Range: 20.0 - 50.0 % 18.8 (L)   LDL Cholesterol Latest Ref Range: 63.0 - 159.0 mg/dL 185.0 (H)   Non-HDL Cholesterol Latest Units: mg/dL 203   Total Cholesterol/HDL Ratio  Latest Ref Range: 2.0 - 5.0  5.3 (H)   Triglycerides Latest Ref Range: 30 - 150 mg/dL 90     Results for BLOSSOM ALCANTARA (MRN [id number]) as of 7/16/2019 08:54   Ref. Range 4/23/2019 13:35 6/29/2019 09:09   WBC Latest Ref Range: 3.90 - 12.70 K/uL 4.14    RBC Latest Ref Range: 4.60 - 6.20 M/uL 5.00    Hemoglobin Latest Ref Range: 14.0 - 18.0 g/dL 14.4    Hematocrit Latest Ref Range: 40.0 - 54.0 % 42.6    MCV Latest Ref Range: 82 - 98 fL 85    MCH Latest Ref Range: 27.0 - 31.0 pg 28.8    MCHC Latest Ref Range: 32.0 - 36.0 g/dL 33.8    RDW Latest Ref Range: 11.5 - 14.5 % 13.8    Platelets Latest Ref Range: 150 - 350 K/uL 259    MPV Latest Ref Range: 9.2 - 12.9 fL 9.5    Gran% Latest Ref Range: 38.0 - 73.0 % 46.2    Gran # (ANC) Latest Ref Range: 1.8 - 7.7 K/uL 1.9    Lymph% Latest Ref Range: 18.0 - 48.0 % 44.2    Lymph # Latest Ref Range: 1.0 - 4.8 K/uL 1.8    Mono% Latest Ref Range: 4.0 - 15.0 % 6.8    Mono # Latest Ref Range: 0.3 - 1.0 K/uL 0.3    Eosinophil% Latest Ref Range: 0.0 - 8.0 % 2.4    Eos # Latest Ref Range: 0.0 - 0.5 K/uL 0.1    Basophil% Latest Ref Range: 0.0 - 1.9 % 0.2    Baso # Latest Ref Range: 0.00 - 0.20 K/uL 0.01    Differential Method Unknown Automated    Iron Latest Ref Range: 45 - 160 ug/dL 76    TIBC Latest Ref Range: 250 - 450 ug/dL 312    Saturated Iron Latest Ref Range: 20 - 50 % 24    Transferrin Latest Ref Range: 200 - 375 mg/dL 211    Ferritin Latest Ref Range: 20.0 - 300.0 ng/mL 307 (H)    Folate Latest Ref Range: 4.0 - 24.0 ng/mL 9.8    Vitamin B-12 Latest Ref Range: 210 - 950 pg/mL 217    Sodium Latest Ref Range: 136 - 145 mmol/L  140   Potassium Latest Ref Range: 3.5 - 5.1 mmol/L  4.3   Chloride Latest Ref Range: 95 - 110 mmol/L  104   CO2 Latest Ref Range: 23 - 29 mmol/L  27   Anion Gap Latest Ref Range: 8 - 16 mmol/L  9   BUN, Bld Latest Ref Range: 6 - 20 mg/dL  11   Creatinine Latest Ref Range: 0.5 - 1.4 mg/dL  1.2   eGFR if non African American Latest Ref Range: >60 mL/min/1.73  m^2  >60   eGFR if  Latest Ref Range: >60 mL/min/1.73 m^2  >60   Glucose Latest Ref Range: 70 - 110 mg/dL  95   Calcium Latest Ref Range: 8.7 - 10.5 mg/dL  9.3   Alkaline Phosphatase Latest Ref Range: 55 - 135 U/L  40 (L)   PROTEIN TOTAL Latest Ref Range: 6.0 - 8.4 g/dL  6.9   Albumin Latest Ref Range: 3.5 - 5.2 g/dL  3.8   BILIRUBIN TOTAL Latest Ref Range: 0.1 - 1.0 mg/dL  0.7   AST Latest Ref Range: 10 - 40 U/L  13   ALT Latest Ref Range: 10 - 44 U/L  18   Triglycerides Latest Ref Range: 30 - 150 mg/dL  70   Cholesterol Latest Ref Range: 120 - 199 mg/dL  252 (H)   HDL Latest Ref Range: 40 - 75 mg/dL  57   Hdl/Cholesterol Ratio Latest Ref Range: 20.0 - 50.0 %  22.6   LDL Cholesterol External Latest Ref Range: 63.0 - 159.0 mg/dL  181.0 (H)   Non-HDL Cholesterol Latest Units: mg/dL  195   Total Cholesterol/HDL Ratio Latest Ref Range: 2.0 - 5.0   4.4   Thiamine Latest Ref Range: 38 - 122 ug/L 46    Vit D, 25-Hydroxy Latest Ref Range: 30 - 96 ng/mL 20 (L)    TSH Latest Ref Range: 0.400 - 4.000 uIU/mL 0.538    PSA, SCREEN Latest Ref Range: 0.00 - 4.00 ng/mL  0.45         TSH   Date/Time Value Ref Range Status   04/23/2019 01:35 PM 0.538 0.400 - 4.000 uIU/mL Final   11/19/2007 04:35 AM 1.5 0.4 - 4.0 uIU/ml Final   11/14/2007 05:45 AM 0.94 0.4 - 4.0 uIU/ml Final     Folate   Date/Time Value Ref Range Status   04/23/2019 01:35 PM 9.8 4.0 - 24.0 ng/mL Final     No results found for this or any previous visit.  Assessment:     Problem List Items Addressed This Visit        Neuro    Trigeminal neuralgia    Current Assessment & Plan     See below            Cardiac/Vascular    Hyperlipidemia    Overview     Results for BLOSSOM ALCANTARA (MRN [id number]) as of 7/16/2019 08:54   Ref. Range 6/29/2019 09:09   Cholesterol Latest Ref Range: 120 - 199 mg/dL 252 (H)   HDL Latest Ref Range: 40 - 75 mg/dL 57   Hdl/Cholesterol Ratio Latest Ref Range: 20.0 - 50.0 % 22.6   LDL Cholesterol External Latest Ref Range: 63.0 -  159.0 mg/dL 181.0 (H)   Non-HDL Cholesterol Latest Units: mg/dL 195   Total Cholesterol/HDL Ratio Latest Ref Range: 2.0 - 5.0  4.4   Triglycerides Latest Ref Range: 30 - 150 mg/dL 70            Current Assessment & Plan     See below            Orthopedic    Chronic bilateral low back pain with right-sided sciatica    Current Assessment & Plan     See below         Sciatica of right side    Current Assessment & Plan     See below                  1. Mixed hyperlipidemia     2. Chronic bilateral low back pain with right-sided sciatica     3. Trigeminal neuralgia     4. Sciatica of right side         43 RH AAM w/ HLP,  o/w healthy who presents for headache.  History is notable for 2-3 annual flares of R sided tingling and pain since 2007.  Exam is notable for WDWN AAM with intact cervical range of motion.  Neuro exam is notable for normal fundus exam, EOMI, PERRL, VFFC, no nystagmys, intact R sided sensation, LT intact, LEAL, negative romberg, intact gait.   Workup is notable abnormal FLP, previously WNL CSF, reportedly normal neuro-imaging in the past, low Vit D, WNL -B12/folate, TSH, B2/B6, iron panel  Pt's presentation is c/w trigeminal neuralgia.   Also concern for sciatica.       Plan:       Trigeminal Neuralgia  -MVT, Vit D3 1000 Unit supplementation  -will consider vascular imaging, if persists or worsesn  -sleep/wake cycle:   -sleep hygiene   -melatonin 3mg at night,   -headaches:   -migraine journal   -magnesium oxide 400mg daily, defer gabapentin   --start Coenzyme Q10 100mg TID   -diclofenac gel 1% 3-4 times as needed  -aroma therapy  -procedure:   Trigeminal Nerve block, deferred    HLP: atorvastatin 10mg daily, brainhealth, life style modifications    Sciatica: PT HEP, NSAIDS PRN, monitor          Sapna Parra MD  Neurologist  [facility]     Focused examination was undertaken today.  I spent 25 minutes with the patient  total face to face with the patient.  >50% of that time  was spent on counseling regarding his symptoms, review of diagnostics, and building and coordinating a treatment plan based on the combination of results and symptoms.   Questions were sought and answered to her stated verbal satisfaction.

## 2019-07-16 NOTE — PATIENT INSTRUCTIONS
Thank you for visiting us at [facility].  You were seen by Dr. Sapna Parra.  Please share any feedback about your experience today on [email or web address] and/or [email or web address].  We appreciate the privilege to participate in your healthcare.  You were seen for trigeminal neuralgia.  We will be recommending the following:    Please message me in 1 month update    Trigeminal Neuralgia  -MVT, Vit D3 1000 Unit daily  supplementation  -journal  -sleep/wake cycle:   -sleep hygiene   -melatonin 3mg at night as needed   -headaches:   -migraine journal   -magnesium oxide 400mg daily, defer gabapentin   --start Coenzyme Q10(CoQ10) 100mg 3 times a day   -diclofenac gel 1% 3-4 Apply kaylah-sized dab 3-4 times a day as needed to areas of pain, e.g. Temple, neck, back  -aroma therapy  -procedure:   Trigeminal Nerve block, deferred    Hyperlipidemia: atorvastatin 10mg daily, brainhealth, life style modifications    Sciatica: physical therapy home exercise program, NSAIDS PRN  -monitor for worsening pain, weakness, muscle loss, saddle anesthesia, bowel/urinary/sexual dysfunction     -Brain Health lifestyle modifications:    -sleep hygiene   - diet: Mediterranean Diet    -exercise: 20-30 minutes of  Moderate exercise 3-5 times a week   -stress management reviewed   -smoking cessation, alcohol moderation    Check out my blog post for further information:  [email or web address]      Sleep Hygiene:    1. Avoid watching TV, eating, and discussing emotional issues in bed. The bed should be used for sleep and sex only. If not, we can associate the bed with other activities and it often becomes difficult to fall asleep.  2. Minimize noise, light, and temperature extremes during sleep with ear plugs, window blinds, or an electric blanket or air conditioner. Even the slightest nighttime noises or luminescent lights can disrupt the quality of your sleep. Try to keep your  bedroom at a comfortable temperature -- not too hot (above 75 degrees) or too cold (below 54 degrees).  3. Try not to drink fluids after 8 p.m. This may reduce awakenings due to urination.  4. Avoid naps, but if you do nap, make it no more than about 25 minutes about eight hours after you awake. But if you have problems falling asleep, then no naps for you.  5. Do not expose your self to bright light if you need to get up at night. Use a small night-light instead.  6. Nicotine is a stimulant and should be avoided particularly near bedtime and upon night awakenings. Having a smoke before bed, although it may feel relaxing, is actually putting a stimulant into your bloodstream.  7. Caffeine is also a stimulant and is present in coffee (100-200 mg), soda (50-75 mg), tea (50-75 mg), and various over-the-counter medications. Caffeine should be discontinued at least four to six hours before bedtime. If you consume large amounts of caffeine and you cut your self off too quickly, beware; you may get headaches that could keep you awake.  8. Although alcohol is a depressant and may help you fall asleep, the subsequent metabolism that clears it from your body when you are sleeping causes a withdrawal syndrome. This withdrawal causes awakenings and is often associated with nightmares and sweats.  9. A light snack may be sleep-inducing, but a heavy meal too close to bedtime interferes with sleep. Stay away from protein and stick to carbohydrates or dairy products. Milk contains the amino acid L-tryptophan, which has been shown in research to help people go to sleep. So milk and cookies or crackers (without chocolate) may be useful and taste good as well.          You may receive a survey about your experience at [facility].  We appreciate you taking the time to complete the survey to help us improve our service and care.

## 2019-10-12 ENCOUNTER — LAB VISIT (OUTPATIENT)
Dept: LAB | Facility: HOSPITAL | Age: 44
End: 2019-10-12
Attending: FAMILY MEDICINE
Payer: COMMERCIAL

## 2019-10-12 DIAGNOSIS — E78.5 HYPERLIPIDEMIA, UNSPECIFIED HYPERLIPIDEMIA TYPE: ICD-10-CM

## 2019-10-12 LAB
ALT SERPL W/O P-5'-P-CCNC: 16 U/L (ref 10–44)
AST SERPL-CCNC: 10 U/L (ref 10–40)
CHOLEST SERPL-MCNC: 195 MG/DL (ref 120–199)
CHOLEST/HDLC SERPL: 4.2 {RATIO} (ref 2–5)
HDLC SERPL-MCNC: 46 MG/DL (ref 40–75)
HDLC SERPL: 23.6 % (ref 20–50)
LDLC SERPL CALC-MCNC: 134.8 MG/DL (ref 63–159)
NONHDLC SERPL-MCNC: 149 MG/DL
TRIGL SERPL-MCNC: 71 MG/DL (ref 30–150)

## 2019-10-12 PROCEDURE — 80061 LIPID PANEL: CPT

## 2019-10-12 PROCEDURE — 84460 ALANINE AMINO (ALT) (SGPT): CPT

## 2019-10-12 PROCEDURE — 36415 COLL VENOUS BLD VENIPUNCTURE: CPT

## 2019-10-12 PROCEDURE — 84450 TRANSFERASE (AST) (SGOT): CPT

## 2019-10-17 ENCOUNTER — PATIENT OUTREACH (OUTPATIENT)
Dept: ADMINISTRATIVE | Facility: OTHER | Age: 44
End: 2019-10-17

## 2019-10-22 ENCOUNTER — OFFICE VISIT (OUTPATIENT)
Dept: NEUROLOGY | Facility: CLINIC | Age: 44
End: 2019-10-22
Attending: PSYCHIATRY & NEUROLOGY
Payer: COMMERCIAL

## 2019-10-22 VITALS
HEIGHT: 76 IN | HEART RATE: 70 BPM | SYSTOLIC BLOOD PRESSURE: 145 MMHG | WEIGHT: 282.44 LBS | BODY MASS INDEX: 34.39 KG/M2 | DIASTOLIC BLOOD PRESSURE: 92 MMHG

## 2019-10-22 DIAGNOSIS — G50.0 TRIGEMINAL NEURALGIA: ICD-10-CM

## 2019-10-22 DIAGNOSIS — E78.2 MIXED HYPERLIPIDEMIA: ICD-10-CM

## 2019-10-22 PROCEDURE — 99214 OFFICE O/P EST MOD 30 MIN: CPT | Mod: S$GLB,,, | Performed by: PSYCHIATRY & NEUROLOGY

## 2019-10-22 PROCEDURE — 99999 PR PBB SHADOW E&M-EST. PATIENT-LVL III: ICD-10-PCS | Mod: PBBFAC,,, | Performed by: PSYCHIATRY & NEUROLOGY

## 2019-10-22 PROCEDURE — 99999 PR PBB SHADOW E&M-EST. PATIENT-LVL III: CPT | Mod: PBBFAC,,, | Performed by: PSYCHIATRY & NEUROLOGY

## 2019-10-22 PROCEDURE — 99214 PR OFFICE/OUTPT VISIT, EST, LEVL IV, 30-39 MIN: ICD-10-PCS | Mod: S$GLB,,, | Performed by: PSYCHIATRY & NEUROLOGY

## 2019-10-22 NOTE — PATIENT INSTRUCTIONS
Thank you for visiting us at [facility].  You were seen by Dr. Sapna Parra.  You were seen for trigeminal neuralgia.  We will be recommending the following:    Trigeminal Neuralgia  -MVT, Vit D3 1000 Unit supplementation  -sleep/wake cycle:   -sleep hygiene   -melatonin 3mg at night as needed  -headaches:   -migraine journal   -magnesium oxide 400mg daily, defer gabapentin   --start Coenzyme Q10 100mg 3 times a day (OTC)   -diclofenac gel 1% (voltaren) Apply kaylah-sized dab 3-4 times a day as needed to areas of pain, e.g. Neck, scalp, forehead, back  -aroma therapy  -procedure:   Trigeminal Nerve block, deferred    hyperlipidemia: atorvastatin 10mg daily, brainhealth, life style modifications    Sciatica: PT HEP, NSAIDS (Aspirin and Aleve as needed), monitor    -Brain Health lifestyle modifications:    -sleep hygiene   - diet: Mediterranean Diet    -exercise: 20-30 minutes of  Moderate exercise 3-5 times a week   -stress management reviewed   -smoking cessation, alcohol moderation    Check out my blog post for further information:  [email or web address]      Sleep Hygiene:    1. Avoid watching TV, eating, and discussing emotional issues in bed. The bed should be used for sleep and sex only. If not, we can associate the bed with other activities and it often becomes difficult to fall asleep.  2. Minimize noise, light, and temperature extremes during sleep with ear plugs, window blinds, or an electric blanket or air conditioner. Even the slightest nighttime noises or luminescent lights can disrupt the quality of your sleep. Try to keep your bedroom at a comfortable temperature -- not too hot (above 75 degrees) or too cold (below 54 degrees).  3. Try not to drink fluids after 8 p.m. This may reduce awakenings due to urination.  4. Avoid naps, but if you do nap, make it no more than about 25 minutes about eight hours after you awake. But if you  have problems falling asleep, then no naps for you.  5. Do not expose your self to bright light if you need to get up at night. Use a small night-light instead.  6. Nicotine is a stimulant and should be avoided particularly near bedtime and upon night awakenings. Having a smoke before bed, although it may feel relaxing, is actually putting a stimulant into your bloodstream.  7. Caffeine is also a stimulant and is present in coffee (100-200 mg), soda (50-75 mg), tea (50-75 mg), and various over-the-counter medications. Caffeine should be discontinued at least four to six hours before bedtime. If you consume large amounts of caffeine and you cut your self off too quickly, beware; you may get headaches that could keep you awake.  8. Although alcohol is a depressant and may help you fall asleep, the subsequent metabolism that clears it from your body when you are sleeping causes a withdrawal syndrome. This withdrawal causes awakenings and is often associated with nightmares and sweats.  9. A light snack may be sleep-inducing, but a heavy meal too close to bedtime interferes with sleep. Stay away from protein and stick to carbohydrates or dairy products. Milk contains the amino acid L-tryptophan, which has been shown in research to help people go to sleep. So milk and cookies or crackers (without chocolate) may be useful and taste good as well.          You may receive a survey about your experience at [facility].  We appreciate you taking the time to complete the survey to help us improve our service and care.

## 2019-10-22 NOTE — PROGRESS NOTES
Subjective:       Patient ID: Daniel Garcias is a 44 y.o. male.    Chief Complaint:  Trauma      History of Present Illness    44 RH AAM w/ HLP o/w healthy who presents for headache.  Pt was last seen on 7/2019 and at that time we recommended:  Trigeminal Neuralgia  -MVT, Vit D3 1000 Unit supplementation  -will consider vascular imaging, if persists or worsesn  -sleep/wake cycle:   -sleep hygiene   -melatonin 3mg at night,   -headaches:   -migraine journal   -magnesium oxide 400mg daily, defer gabapentin   --start Coenzyme Q10 100mg TID   -diclofenac gel 1% 3-4 times as needed  -aroma therapy  -procedure:   Trigeminal Nerve block, deferred    HLP: atorvastatin 10mg daily, brainhealth, life style modifications    Sciatica: PT HEP, NSAIDS PRN, monitor    In the interim, pt has been doing well.  He has had two episodes of R sided facial pain.  He is adherent to Mag-Ox.  Has not used gel or CoQ10.  Sleeping well.   Mood is good.   Low back is again.       Pt was last seen on 4/23/19 and at that time we recommended:  Trigeminal Neuralgia  -B12/folate, TSH, Vitamin D, B2/B6, iron panel  -will consider vascular imaging, if persists or worsesn  -sleep/wake cycle:   -sleep hygiene   -melatonin 3mg at night,   -headaches:   -migraine journal   -magnesium oxide 400mg daily, defer gabapentin   -diclofenac gel 1% 3-4 times as needed  -aroma therapy  -procedure:   Trigeminal Nerve block, deferred    HLP: brainhealth, life style modifications    In the interim, pt has been relatively stable.  He had an episode of R temple sharp pain that lasted for minutes and spontaneously resolved.  Did not  diclofenac gel.  Adherent to Vitamin D.  Sleep is 'OK'.  No issues falling or staying asleep.  No snoring.  Has not tried melatonin.  Back pain is stable. He does endorse 2 times a week radiating, tingling pain.   No weakness, muscle loss, saddle anesthesia, urinary/bowel/sexual dysfunction.      Initial HPI (4/23/2019)  Pt  presented to [facility] ED in 2007 for R sided facial tingling.  Pt was evaluated with MRI and LP all of which was unremarkable.  Since then pt has had intermittent headaches.  It is always right sided that usually starts as a sharp radiating pain localizes to right temple and occiput..  It then evolves into a dull bothersome pain.  He rates it as 5/10.   Pt denies aura or triggers.  He has flares 2-3 times a year  He denies photo/phonophobia, n/v, slurred speech, vision, weakness, hearing.   NSAIDs help mild to moderate.    Pt has been evaluated by [facility] neurologists previously.   No medicine or procedures.  Of note, pt also notes sciatic pain in Right leg.  He endorses chronic.  He denies weakness or numbness.  No incontinence.  Pt notes good sleep. No snoring .  Mood is OK.  Denies SI/HI.         No past medical history on file.    Past Surgical History:   Procedure Laterality Date    COLONOSCOPY N/A 4/11/2018    Procedure: COLONOSCOPY golytely;  Surgeon: Deborah Gamino MD;  Location: [facility];  Service: Endoscopy;  Laterality: N/A;    KNEE ARTHROSCOPY W/ ACL RECONSTRUCTION      left    KNEE ARTHROSCOPY W/ MENISCAL REPAIR  1998    left       Family History   Problem Relation Age of Onset    Hypertension Mother     Hyperlipidemia Father     Cancer Maternal Grandmother         colon       Social History     Socioeconomic History    Marital status:      Spouse name: Not on file    Number of children: 3    Years of education: Not on file    Highest education level: Not on file   Occupational History    Occupation: Operations   Social Needs    Financial resource strain: Not on file    Food insecurity:     Worry: Not on file     Inability: Not on file    Transportation needs:     Medical: Not on file     Non-medical: Not on file   Tobacco Use    Smoking status: Never Smoker    Smokeless tobacco: Never Used   Substance and Sexual Activity    Alcohol use: No    Drug use: No    Sexual activity:  Yes     Partners: Female   Lifestyle    Physical activity:     Days per week: Not on file     Minutes per session: Not on file    Stress: Not on file   Relationships    Social connections:     Talks on phone: Not on file     Gets together: Not on file     Attends Sikh service: Not on file     Active member of club or organization: Not on file     Attends meetings of clubs or organizations: Not on file     Relationship status: Not on file   Other Topics Concern    Not on file   Social History Narrative    Not on file         Current Outpatient Medications:     atorvastatin (LIPITOR) 10 MG tablet, Take 1 tablet (10 mg total) by mouth once daily., Disp: 90 tablet, Rfl: 3    diclofenac sodium (VOLTAREN) 1 % Gel, Apply 2 g topically 4 (four) times daily. As needed to areas of scalp, head and neck that are in pain, Disp: 1 Tube, Rfl: 6    Review of Systems  Review of Systems   Constitutional: Negative for chills, fever and weight loss.   HENT: Negative for hearing loss and tinnitus.    Respiratory: Negative for cough.    Cardiovascular: Negative for chest pain and palpitations.   Gastrointestinal: Negative for nausea and vomiting.   Musculoskeletal: Positive for back pain. Negative for falls, joint pain, myalgias and neck pain.   Neurological: Positive for tingling. Negative for dizziness, tremors, sensory change, speech change, focal weakness, seizures, loss of consciousness, weakness and headaches.   Psychiatric/Behavioral: Negative for depression, hallucinations, memory loss, substance abuse and suicidal ideas. The patient is not nervous/anxious and does not have insomnia.        Objective:     Vitals:    10/22/19 0913   BP: (!) 145/92   Pulse: 70      Physical Exam      Constitutional: male appears well-developed and well-nourished.   HENT:   Head: Normocephalic and atraumatic.   Neck and spine: Normal range of motion. Neck supple. No TTP in cervical, thoracic, and lumbar spine  Cardiovascular: Normal  rate, regular rhythm   Pulmonary/Chest: Effort normal and breath sounds normal.   Abdominal: Soft. Bowel sounds are normal.   Skin: Skin is warm.   Ext: No c/c/e noted    The patient is awake, attentive, Alert, oriented to person, place and time.  Language is intact to comprehension, repetition, and production  Able to follow multi-step commands  No findings to suggest executive dysfuntion    Patient has adequate insight    Mood is stable    Funduscopic exam - disc were flat and pink, no papilledema bilaterally and posterior segments show no vessel changes, exudates, or hemorrhages  Pursuits were smooth, normal saccades, no nystagmus bilaterally  PERRL, VFFC, EOMI, sensation is intact V1-V3 on R to LT and PP, NO CI   Motor - facial movement was symmetrical and normal, no facial droop seen.   hearing grossly intact  Palate moved well and was symmetrical with normal palatal and oral sensation  Tongue protrudes midline and movements were full  Traps raise b/l equally    Normal tone.  No spasticity, cogwheel rigidity  Normal bulk. No pronator drift                        Right      Left  Deltoid            5          5  Biceps            5          5  Triceps           5          5                   5          5    Hip Flex            5          5  Hip Ext             5          5  Knee Flex         5          5  Knee Ext          5          5    No tremor noted    Reflexes brisk in RLE(patellar, cross-adductors)  And o/w symmteric in bl upper and lower extremities, including biceps, triceps, and patellar    Sensory:  Light touch:  intact    Coordination:  F-to-N:  intact    Rapid-alt movements:  Normal amplitude and frequency     Romberg Sign:  negative  General gait:   Normal arm swing and turns. Normal postural reflexes.   Tandem gait:  intact      Results for BLOSSOM ALCANTARA (MRN [id number]) as of 4/23/2019 12:38   Ref. Range 11/19/2007 15:16   Heme Aliquot Latest Units: ml 1.5   Appearance, CSF Unknown Bloody    WBC, CSF Latest Ref Range: 0 - 5 /CuMm 4   RBC, CSF Latest Ref Range: 0 - 5 /CuMm 2,750 (A)   Segmented Neutrophils, CSF Latest Ref Range: 0 - 6 % 90 (H)   Lymphs, CSF Latest Ref Range: 0 - 80 % 10   Glucose, CSF Latest Ref Range: 40 - 70 mg/dl 52   Protein, CSF Latest Ref Range: 12 - 60 mg/dl 39   VDRL, CSF Latest Ref Range: Non-Reactive  Non-Reactive     Results for BLOSSOM ALCANTARA (MRN [id number]) as of 4/23/2019 12:38   Ref. Range 6/4/2018 09:33   Cholesterol Latest Ref Range: 120 - 199 mg/dL 250 (H)   HDL Latest Ref Range: 40 - 75 mg/dL 47   HDL/Chol Ratio Latest Ref Range: 20.0 - 50.0 % 18.8 (L)   LDL Cholesterol Latest Ref Range: 63.0 - 159.0 mg/dL 185.0 (H)   Non-HDL Cholesterol Latest Units: mg/dL 203   Total Cholesterol/HDL Ratio Latest Ref Range: 2.0 - 5.0  5.3 (H)   Triglycerides Latest Ref Range: 30 - 150 mg/dL 90     Results for BLOSSOM ALCANTARA (MRN [id number]) as of 7/16/2019 08:54   Ref. Range 4/23/2019 13:35 6/29/2019 09:09   WBC Latest Ref Range: 3.90 - 12.70 K/uL 4.14    RBC Latest Ref Range: 4.60 - 6.20 M/uL 5.00    Hemoglobin Latest Ref Range: 14.0 - 18.0 g/dL 14.4    Hematocrit Latest Ref Range: 40.0 - 54.0 % 42.6    MCV Latest Ref Range: 82 - 98 fL 85    MCH Latest Ref Range: 27.0 - 31.0 pg 28.8    MCHC Latest Ref Range: 32.0 - 36.0 g/dL 33.8    RDW Latest Ref Range: 11.5 - 14.5 % 13.8    Platelets Latest Ref Range: 150 - 350 K/uL 259    MPV Latest Ref Range: 9.2 - 12.9 fL 9.5    Gran% Latest Ref Range: 38.0 - 73.0 % 46.2    Gran # (ANC) Latest Ref Range: 1.8 - 7.7 K/uL 1.9    Lymph% Latest Ref Range: 18.0 - 48.0 % 44.2    Lymph # Latest Ref Range: 1.0 - 4.8 K/uL 1.8    Mono% Latest Ref Range: 4.0 - 15.0 % 6.8    Mono # Latest Ref Range: 0.3 - 1.0 K/uL 0.3    Eosinophil% Latest Ref Range: 0.0 - 8.0 % 2.4    Eos # Latest Ref Range: 0.0 - 0.5 K/uL 0.1    Basophil% Latest Ref Range: 0.0 - 1.9 % 0.2    Baso # Latest Ref Range: 0.00 - 0.20 K/uL 0.01    Differential Method Unknown Automated     Iron Latest Ref Range: 45 - 160 ug/dL 76    TIBC Latest Ref Range: 250 - 450 ug/dL 312    Saturated Iron Latest Ref Range: 20 - 50 % 24    Transferrin Latest Ref Range: 200 - 375 mg/dL 211    Ferritin Latest Ref Range: 20.0 - 300.0 ng/mL 307 (H)    Folate Latest Ref Range: 4.0 - 24.0 ng/mL 9.8    Vitamin B-12 Latest Ref Range: 210 - 950 pg/mL 217    Sodium Latest Ref Range: 136 - 145 mmol/L  140   Potassium Latest Ref Range: 3.5 - 5.1 mmol/L  4.3   Chloride Latest Ref Range: 95 - 110 mmol/L  104   CO2 Latest Ref Range: 23 - 29 mmol/L  27   Anion Gap Latest Ref Range: 8 - 16 mmol/L  9   BUN, Bld Latest Ref Range: 6 - 20 mg/dL  11   Creatinine Latest Ref Range: 0.5 - 1.4 mg/dL  1.2   eGFR if non African American Latest Ref Range: >60 mL/min/1.73 m^2  >60   eGFR if  Latest Ref Range: >60 mL/min/1.73 m^2  >60   Glucose Latest Ref Range: 70 - 110 mg/dL  95   Calcium Latest Ref Range: 8.7 - 10.5 mg/dL  9.3   Alkaline Phosphatase Latest Ref Range: 55 - 135 U/L  40 (L)   PROTEIN TOTAL Latest Ref Range: 6.0 - 8.4 g/dL  6.9   Albumin Latest Ref Range: 3.5 - 5.2 g/dL  3.8   BILIRUBIN TOTAL Latest Ref Range: 0.1 - 1.0 mg/dL  0.7   AST Latest Ref Range: 10 - 40 U/L  13   ALT Latest Ref Range: 10 - 44 U/L  18   Triglycerides Latest Ref Range: 30 - 150 mg/dL  70   Cholesterol Latest Ref Range: 120 - 199 mg/dL  252 (H)   HDL Latest Ref Range: 40 - 75 mg/dL  57   Hdl/Cholesterol Ratio Latest Ref Range: 20.0 - 50.0 %  22.6   LDL Cholesterol External Latest Ref Range: 63.0 - 159.0 mg/dL  181.0 (H)   Non-HDL Cholesterol Latest Units: mg/dL  195   Total Cholesterol/HDL Ratio Latest Ref Range: 2.0 - 5.0   4.4   Thiamine Latest Ref Range: 38 - 122 ug/L 46    Vit D, 25-Hydroxy Latest Ref Range: 30 - 96 ng/mL 20 (L)    TSH Latest Ref Range: 0.400 - 4.000 uIU/mL 0.538    PSA, SCREEN Latest Ref Range: 0.00 - 4.00 ng/mL  0.45         TSH   Date/Time Value Ref Range Status   04/23/2019 01:35 PM 0.538 0.400 - 4.000 uIU/mL Final    11/19/2007 04:35 AM 1.5 0.4 - 4.0 uIU/ml Final   11/14/2007 05:45 AM 0.94 0.4 - 4.0 uIU/ml Final     Folate   Date/Time Value Ref Range Status   04/23/2019 01:35 PM 9.8 4.0 - 24.0 ng/mL Final     No results found for this or any previous visit.  Assessment:     Problem List Items Addressed This Visit        Neuro    Trigeminal neuralgia    Current Assessment & Plan     See below            Cardiac/Vascular    Hyperlipidemia    Overview     Results for BLOSSOM ALCANTARA (MRN [id number]) as of 7/16/2019 08:54   Ref. Range 6/29/2019 09:09   Cholesterol Latest Ref Range: 120 - 199 mg/dL 252 (H)   HDL Latest Ref Range: 40 - 75 mg/dL 57   Hdl/Cholesterol Ratio Latest Ref Range: 20.0 - 50.0 % 22.6   LDL Cholesterol External Latest Ref Range: 63.0 - 159.0 mg/dL 181.0 (H)   Non-HDL Cholesterol Latest Units: mg/dL 195   Total Cholesterol/HDL Ratio Latest Ref Range: 2.0 - 5.0  4.4   Triglycerides Latest Ref Range: 30 - 150 mg/dL 70            Current Assessment & Plan     See below                  1. Trigeminal neuralgia     2. Mixed hyperlipidemia         44 RH AAM w/ HLP,  o/w healthy who presents for headache.  History is notable for 2-3 annual flares of R sided tingling and pain since 2007.  Exam is notable for WDWN AAM with intact cervical range of motion.  Neuro exam is notable for normal fundus exam, NO CI, EOMI, PERRL, VFFC, no nystagmus, intact R-sided facial sensation, LT intact, LEAL, negative romberg, intact gait.   Workup is notable abnormal FLP, previously WNL CSF, reportedly normal neuro-imaging in the past, low Vit D, WNL -B12/folate, TSH, B2/B6, iron panel  Pt's presentation is c/w trigeminal neuralgia.   Sciatica stable      Plan:       Trigeminal Neuralgia  -MVT, Vit D3 1000 Unit supplementation  -will consider vascular imaging, if persists or worsesn  -sleep/wake cycle:   -sleep hygiene   -melatonin 3mg at night,   -headaches:   -migraine journal   -magnesium oxide 400mg daily, defer gabapentin   --start  Coenzyme Q10 100mg TID   -diclofenac gel 1% 3-4 times as needed  -aroma therapy  -procedure:   Trigeminal Nerve block, deferred    HLP: atorvastatin 10mg daily, brainhealth, life style modifications    Sciatica: PT HEP, NSAIDS PRN, monitor          Sapna Parra MD  Neurologist  [facility]     Focused examination was undertaken today.  I spent 25 minutes with the patient  total face to face with the patient.  >50% of that time was spent on counseling regarding his symptoms, review of diagnostics, and building and coordinating a treatment plan based on the combination of results and symptoms.   Questions were sought and answered to her stated verbal satisfaction.

## 2019-10-28 ENCOUNTER — PATIENT MESSAGE (OUTPATIENT)
Dept: NEUROLOGY | Facility: CLINIC | Age: 44
End: 2019-10-28

## 2019-10-28 DIAGNOSIS — M54.12 CERVICAL RADICULOPATHY: ICD-10-CM

## 2019-10-29 PROBLEM — M54.12 CERVICAL RADICULOPATHY: Status: ACTIVE | Noted: 2019-10-29

## 2019-10-29 RX ORDER — METHYLPREDNISOLONE 4 MG/1
TABLET ORAL
Qty: 1 PACKAGE | Refills: 0 | Status: SHIPPED | OUTPATIENT
Start: 2019-10-29 | End: 2021-02-11 | Stop reason: ALTCHOICE

## 2019-10-29 NOTE — TELEPHONE ENCOUNTER
Spoke with patient.  He endorses radiating pain from his neck to shoulder and occasional RUE.  It is shooting and radiating.  It has persisted for 3 weeks.  He rates the pain has 5/10.  He has rare and mild weakness. No muscle loss and weakness.  NSAIDs help mildly.  He is adherent Magnesium Oxide 400mg daily.  Recommended starting Coq!0 100mg TID.  Diclofenac gel 1% topical is not helpful.  Medrol Dose pack and PT cervical ROM.  Will consider MRI and referral to [facility] Sp[facility] pending course.  Pt acknowledged understanding of results and agreement with plan.

## 2019-11-15 ENCOUNTER — CLINICAL SUPPORT (OUTPATIENT)
Dept: REHABILITATION | Facility: HOSPITAL | Age: 44
End: 2019-11-15
Attending: PSYCHIATRY & NEUROLOGY
Payer: COMMERCIAL

## 2019-11-15 DIAGNOSIS — M79.18 MYOFASCIAL PAIN: ICD-10-CM

## 2019-11-15 DIAGNOSIS — M54.2 NECK PAIN ON RIGHT SIDE: Primary | ICD-10-CM

## 2019-11-15 DIAGNOSIS — Z74.09 IMPAIRED MOBILITY: ICD-10-CM

## 2019-11-15 PROBLEM — M54.41 CHRONIC BILATERAL LOW BACK PAIN WITH RIGHT-SIDED SCIATICA: Status: RESOLVED | Noted: 2019-05-10 | Resolved: 2019-11-15

## 2019-11-15 PROBLEM — G89.29 CHRONIC BILATERAL LOW BACK PAIN WITH RIGHT-SIDED SCIATICA: Status: RESOLVED | Noted: 2019-05-10 | Resolved: 2019-11-15

## 2019-11-15 PROCEDURE — 97161 PT EVAL LOW COMPLEX 20 MIN: CPT | Mod: PN

## 2019-11-15 PROCEDURE — 97140 MANUAL THERAPY 1/> REGIONS: CPT | Mod: PN

## 2019-11-15 PROCEDURE — 97110 THERAPEUTIC EXERCISES: CPT | Mod: PN

## 2019-11-15 NOTE — PLAN OF CARE
[facility]  Physical Therapy Initial Evaluation    Name: Daniel Garcias  Clinic Number: [id number]    Therapy Diagnosis:   Encounter Diagnoses   Name Primary?    Neck pain on right side Yes    Myofascial pain     Impaired mobility      Physician: Sapna Parra MD    Physician Orders: PT Eval and Treat   Medical Diagnosis from Referral: M54.12 (ICD-10-CM) - Cervical radiculopathy  Evaluation Date: 11/15/2019  Authorization Period Expiration: 12/31/2019  Plan of Care Expiration: 11/15/2019 to 12/13/2019  Visit # / Visits authorized: 1/50    Time In: 1020  Time Out: 1100  Total Billable Time: 40 minutes    Precautions: Standard    Subjective     Date of onset: 1 month ago    History of current condition - Daniel reports: R sided neck pain beginning insidiously one month ago. Unsure what is related to; no history of previous neck pain. Patient denies symptoms into either UE or increased history of headaches. Patient with a history of trigeminal neuralgia affecting the R side; associated headaches with this. Current pain is more annoying than limiting in nature; he has halted performance of upper body exercise to offset potential exacerbation, however.      Medical History:   No past medical history on file.    Surgical History:   Daniel Garcias  has a past surgical history that includes Knee arthroscopy w/ meniscal repair (1998); Knee arthroscopy w/ ACL reconstruction; and Colonoscopy (N/A, 4/11/2018).    Medications:   Daniel has a current medication list which includes the following prescription(s): atorvastatin, diclofenac sodium, and methylprednisolone.    Allergies:   Review of patient's allergies indicates:   Allergen Reactions    Shellfish containing products Hives      Imaging: none    Prior Therapy: Not for current pain  Social History: No limitations with household mobility. Patient has held exercise for the last two weeks secondary to pain  Occupation:  for  "[employer]. Patient reports no limitations with work. 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   Prior Level of Function: Independent  Current Level of Function: Independent; pain annoys with cervical and shoulder AROM, but does not limit    Pain:  Current 3/10, worst 4/10, best 1/10   Location: R side of neck  Description: Achy  Aggravating Factors: Cervical extension  Easing Factors: None    Pts goals: Eliminate pain; return to exercise    Objective     Posture: B scapular protraction, abduction and depression. Lower cervical flexion and upper extension  Palpation: Tenderness to R upper trapezius    Range of Motion/Strength:     CERVICAL AROM Pain/Dysfunction with Movement   Flexion 65 Increased pain to R side of neck. Unchanged after treatment   Extension 49 Increased pain to R side of neck, greater than flexion. No pain following treatment   Right side bending 56 None   Left side bending 46 None   Right rotation 70 None   Left rotation 80 None     Shoulder Right Left Pain/Dysfunction with Movement   AROM      flexion  WNL  WNL None   extension  WNL  WNL None   abduction  WNL  WNL None   IR at 90° abd  WNL  WNL None   IR at 0° abd  WNL  WNL None   ER at 90° abd  WNL  WNL None   ER at 0° abd  WNL  WNL None     Elbow Right Left Pain/Dysfunction with Movement   AROM      flexion  WNL  WNL None   extension  WNL  WNL None     U/E MMT Right Left Pain/Dysfunction with Movement   Shoulder Flexion 4+/5 4+/5 None   Shoulder Extension 5/5 5/5 None   Shoulder Abduction 4/5 4/5 None   Shoulder IR  @ 90* Abduction 4+/5 4+/5 None   Shoulder ER  @ 90* Abduction 5/5 5/5 None   Elbow Flexion  5/5 5/5 None   Elbow Extension 5/5 5/5 None     Joint Mobility:   - Cervical: Moderate hypomobility with C6-7   - Thoracic: Mild hypomobility with T1-3     Flexibility: Impaired to pectoralis major and B upper trapezius    CMS Impairment/Limitation/Restriction for " "FOTO Neck Survey    Therapist reviewed FOTO scores for Daniel Garcias on 11/15/2019.   FOTO documents entered into [employer] - see Media section.    Limitation Score: 4%  Category: Body Position     TREATMENT     Treatment Time In: 1140  Treatment Time Out: 1200  Total Treatment time separate from Evaluation: 20 minutes    Written and verbal consent for functional dry needling obtained (see Media for written).     Daniel received the following manual therapy techniques: were applied to the: neck for 10 minutes, including:  Tenderness to B upper trapezius, greater distally  Functional dry needling to B distal upper trapezius with electrical stimulation; R upper trapezius mid-belly without electrical stimulation     Daniel received therapeutic exercises to develop flexibility for 10 minutes including:     Seated:  Cervical retraction: 2x10  Scapular retraction: 2x10  Upper trapezius stretch: x30" B     Home Exercises and Patient Education Provided     Education provided:   - Possible mechanisms contributing to pain and impairments; role of therapy to address  - Purpose and procedure of functional dry needling; risks and benefits; what to expect  - HEP of therapeutic exercises performed above     Written Home Exercises Provided: yes.  Exercises were reviewed and Daniel was able to demonstrate them prior to the end of the session. Daniel demonstrated good understanding of the education provided.      See EMR under Patient Instructions for exercises provided 11/15/2019.    Assessment     Daniel is a 44 y.o. male referred to outpatient Physical Therapy with a medical diagnosis of cervical radiculopathy. Pt presents to initial evaluation with signs and symptoms of possible R upper trapezius irritation. Good twitches with dry needling + electrical stimulation; pain with cervical extension eliminated after performance. No increased pain with therapeutic exercise performance.     Pt prognosis is Excellent.   Pt will benefit from " skilled outpatient Physical Therapy to address the deficits stated above and in the chart below, provide pt/family education, and to maximize pt's level of independence.     Plan of care discussed with patient: Yes  Pt's spiritual, cultural and educational needs considered and pt agreeable to plan of care and goals as stated below:     Anticipated Barriers for therapy: None    Medical Necessity is demonstrated by the following  History  Co-morbidities and personal factors that may impact the plan of care Co-morbidities:   Back pain, BMI over 30, Headaches,     Personal Factors:   no deficits     moderate   Examination  Body Structures and Functions, activity limitations and participation restrictions that may impact the plan of care Body Regions:   head  neck  trunk    Body Systems:    ROM  strength  transitions    Participation Restrictions:   none    Activity limitations:   Learning and applying knowledge  no deficits    General Tasks and Commands  no deficits    Communication  no deficits    Mobility  lifting and carrying objects   Cervical extension    Self care  None    Domestic Life  doing house work (cleaning house, washing dishes, laundry)       Interactions/Relationships  no deficits    Life Areas  no deficits    Community and Social Life  recreation and leisure         moderate   Clinical Presentation stable and uncomplicated low   Decision Making/ Complexity Score: low     Goals:  Short=Long Term Goals (4 Weeks):   1. Pt will be independent with HEP to supplement PT in improving pain free cervical mobility  2. Pt will improve FOTO to 0% limitation to improve perceived limitation with changing and maintaining body position.  3. Pt will report no pain with cervical AROM in all planes to promote functional mobility.  4. Pt will return to prior level of upper body workouts without pain to promote physical activity.     Plan     Plan of care Certification: 11/15/2019 to 12/13/2019.    Outpatient Physical  Therapy 8 visits in 30 days to include the following interventions: Electrical Stimulation -, Manual Therapy, Moist Heat/ Ice, Neuromuscular Re-ed, Patient Education, Therapeutic Activites and Therapeutic Exercise.     Shakira Chapman, PT, DPT

## 2019-11-15 NOTE — PATIENT INSTRUCTIONS
-------------    What To Expect After Functional Dry Needling ® Treatments           How will I feel after a session of FDN?     You may feel some soreness immediately after treatment in the area of the body you were treated. This does not always occur but should be expected and is considered normal. It can also take up to a few hours, or even until the next day, to feel an onset of soreness. The soreness may vary from person to person and based on the area of the body that was treated, but it typically feels like you had an intense workout at the gym. Soreness typically lasts 24-48 hours. Make sure to indicate to your provider at a follow-up appointment how long the soreness lasted.   Bruising from the treatment is possible, somehow uncommon, but it is not of concern. Some areas are more likely to bruise than others, including the shoulders, chest, face, and portions of the extremities. Large bruising rarely occurs, but is possible. Use ice to help decrease the bruising and if you feel concern, please call your provider.    It is common to feel tired/fatigued, energized, emotional, giggly, or out of it after treatment. This is a normal response that can last up to an hour or two after treatment. If this lasts beyond a day, contact your provider as a precaution.   There are times when treatment may actually exacerbate your symptoms. This is normal and may indicate that you need to follow up sooner with your practitioner to continue treatment. If this continues past the 24-48 hour window, keep note of it, as this can help your provider adjust your treatment plan if needed based on your report. This does not mean that FDN cannot help your condition.    What should I do after my treatment and what is recommended?    We highly recommend increasing your water intake for the next 24 hours after treatment to help avoid or reduce soreness. We also recommend soaking in a hot bath or hot tub to help relieve post  treatment soreness and to soften the symptoms associated with the treatment you received. After dry needling treatment, you may do the following based on your comfort level. Please note that if it hurts or exacerbates your symptoms, then discontinuing the activity is best.     Work out and/or stretch.   Participate in normal physical activity.   Massage the area.   Use heat or ice as preferred for post treatment soreness.   Stay hydrated.    If you have prescription medicines, continue to take them as prescribed.    What should I avoid after treatment?     Unfamiliar physical activities or sports.   Doing more than you normally do.   Excessive alcohol intake.     If you are feeling light headed, or experience difficulty breathing, chest pain, or any other concerning symptoms after treatment, call us immediately. If you are unable to get a hold of us, please call your physician.

## 2019-11-18 PROBLEM — M54.2 NECK PAIN ON RIGHT SIDE: Status: ACTIVE | Noted: 2019-11-18

## 2019-11-18 PROBLEM — M79.18 MYOFASCIAL PAIN: Status: ACTIVE | Noted: 2019-11-18

## 2019-11-18 PROBLEM — Z74.09 IMPAIRED MOBILITY: Status: ACTIVE | Noted: 2019-11-18

## 2019-11-22 ENCOUNTER — TELEPHONE (OUTPATIENT)
Dept: REHABILITATION | Facility: HOSPITAL | Age: 44
End: 2019-11-22

## 2019-11-22 DIAGNOSIS — M54.2 NECK PAIN ON RIGHT SIDE: ICD-10-CM

## 2019-11-22 DIAGNOSIS — M79.18 MYOFASCIAL PAIN: ICD-10-CM

## 2019-11-22 DIAGNOSIS — Z74.09 IMPAIRED MOBILITY: ICD-10-CM

## 2019-12-06 ENCOUNTER — CLINICAL SUPPORT (OUTPATIENT)
Dept: REHABILITATION | Facility: HOSPITAL | Age: 44
End: 2019-12-06
Attending: PSYCHIATRY & NEUROLOGY
Payer: COMMERCIAL

## 2019-12-06 DIAGNOSIS — Z74.09 IMPAIRED MOBILITY: ICD-10-CM

## 2019-12-06 DIAGNOSIS — M79.18 MYOFASCIAL PAIN: ICD-10-CM

## 2019-12-06 DIAGNOSIS — M54.2 NECK PAIN ON RIGHT SIDE: ICD-10-CM

## 2019-12-06 PROCEDURE — 97140 MANUAL THERAPY 1/> REGIONS: CPT | Mod: PN

## 2019-12-06 PROCEDURE — 97110 THERAPEUTIC EXERCISES: CPT | Mod: PN

## 2019-12-06 NOTE — PROGRESS NOTES
Physical Therapy Daily Treatment Note     Name: Daniel MADRIGAL [facility]  Clinic Number: [id number]    Therapy Diagnosis:   Encounter Diagnoses   Name Primary?    Neck pain on right side     Myofascial pain     Impaired mobility      Physician: Sapna Parra MD    Visit Date: 12/6/2019    Physician: Sapna Parra MD     Physician Orders: PT Eval and Treat   Medical Diagnosis from Referral: M54.12 (ICD-10-CM) - Cervical radiculopathy  Evaluation Date: 11/15/2019  Authorization Period Expiration: 12/31/2019  Plan of Care Expiration: 11/15/2019 to 12/13/2019  Visit # / Visits authorized: 2/50     Time In: 9:05  Time Out: 9:50  Total Billable Time: 45 minutes (2 TE + 1 MT)     Precautions: Standard    Subjective     Pt reports: been having less pain and getting better everyday  He was compliant with home exercise program.  Response to previous treatment: decreased pain  Functional change: going back to his workout routine    Pain: 1/10  Location: right neck and upper trap    Objective     Daniel received the following manual therapy techniques: Joint mobilizations, Manual traction, Myofacial release and Soft tissue Mobilization were applied to the: cervical spine and shoudlers for 10 minutes, including:  FDN to B UT at distal and proximal ends with E-stim    Daniel received therapeutic exercises to develop strength, endurance, ROM, flexibility, posture and core stabilization for 35 minutes including:  Supine cervical retractions: 20x5''  Supine pec stretch: 2' for middle and lower pecs each on 1/2 roll  Prone T's/Y's: 2x10 B 1#  Prone I's with scap retraction: 3x10 B 3#  Seated cervical retraction with extension: 15x    Home Exercises Provided and Patient Education Provided   Education provided:   - Possible mechanisms contributing to pain and impairments; role of therapy to address  - Purpose and procedure of functional dry needling; risks and benefits; what to expect  - HEP of therapeutic exercises performed  above    Written Home Exercises Provided: yes.  Exercises were reviewed and Daniel was able to demonstrate them prior to the end of the session.  Daniel demonstrated good  understanding of the education provided.     See EMR under Patient Instructions for exercises provided 11/15/19.    Assessment     Pt tolerated all TE well. Full cervical rotation B and slight decreased cervical extension with C5 hypermobility at skin crease.  Daniel is progressing well towards his goals.   Pt prognosis is Excellent.     Pt will continue to benefit from skilled outpatient physical therapy to address the deficits listed in the problem list box on initial evaluation, provide pt/family education and to maximize pt's level of independence in the home and community environment.   Pt's spiritual, cultural and educational needs considered and pt agreeable to plan of care and goals.    Anticipated barriers to physical therapy: None    Goals:   Short=Long Term Goals (4 Weeks):   1. Pt will be independent with HEP to supplement PT in improving pain free cervical mobility - progressing  2. Pt will improve FOTO to 0% limitation to improve perceived limitation with changing and maintaining body position. - progressing  3. Pt will report no pain with cervical AROM in all planes to promote functional mobility. - progressing  4. Pt will return to prior level of upper body workouts without pain to promote physical activity.  - progressing    Plan     Cont per POC.    Nolan Chan, PT

## 2019-12-13 ENCOUNTER — TELEPHONE (OUTPATIENT)
Dept: REHABILITATION | Facility: HOSPITAL | Age: 44
End: 2019-12-13

## 2020-01-21 ENCOUNTER — DOCUMENTATION ONLY (OUTPATIENT)
Dept: REHABILITATION | Facility: HOSPITAL | Age: 45
End: 2020-01-21

## 2020-01-21 DIAGNOSIS — M79.18 MYOFASCIAL PAIN: ICD-10-CM

## 2020-01-21 DIAGNOSIS — Z74.09 IMPAIRED MOBILITY: ICD-10-CM

## 2020-01-21 DIAGNOSIS — M54.2 NECK PAIN ON RIGHT SIDE: ICD-10-CM

## 2020-01-21 NOTE — PROGRESS NOTES
Pt was evaluated on 11/15/2019 and was seen 2 times for PT. Pt has not attended PT since 2019. Pt was given HEP at evaluation. Current status is unknown. Plan of care  2019. Start of new calendar year. Pt to be d/c'd at this time.

## 2020-02-07 ENCOUNTER — TELEPHONE (OUTPATIENT)
Dept: INTERNAL MEDICINE | Facility: CLINIC | Age: 45
End: 2020-02-07

## 2020-02-07 NOTE — TELEPHONE ENCOUNTER
The scheduled apt was the soonest I can book unless override or cancellation  Please advise thank you

## 2020-02-07 NOTE — TELEPHONE ENCOUNTER
Spoke with pt and scheduled the appt with Dr. Izqiuerdo for high BP. Also advised pt to keep monitoring his BP and keep us updated via portal. Pt verbalized understanding

## 2020-02-07 NOTE — TELEPHONE ENCOUNTER
----- Message from Gretta Kim sent at 2/7/2020  9:29 AM CST -----  Contact: [phone]  Caller is requesting an earlier appt than we can schedule.  Caller declined first available appointment listed below. Caller will not accept being placed on the wait list and is requesting a message be sent to the provider.  When is the next available appointment:  March   Did you offer to schedule the next available appt and put the patient on the wait list?:yes     What visit type: same day  Symptoms:  Blood pressure -134/78   Patient preference of timeframe to be scheduled:asap    What is the reason the patient is requesting a sooner appointment? (insurance terminating, changing jobs):    Would the patient rather a call back or a response via [facility]?:  Call back  Comments:  Only wants to see PCP.

## 2020-02-10 ENCOUNTER — PATIENT OUTREACH (OUTPATIENT)
Dept: ADMINISTRATIVE | Facility: HOSPITAL | Age: 45
End: 2020-02-10

## 2020-02-14 ENCOUNTER — OFFICE VISIT (OUTPATIENT)
Dept: INTERNAL MEDICINE | Facility: CLINIC | Age: 45
End: 2020-02-14
Attending: FAMILY MEDICINE
Payer: COMMERCIAL

## 2020-02-14 VITALS
TEMPERATURE: 98 F | WEIGHT: 275.13 LBS | DIASTOLIC BLOOD PRESSURE: 78 MMHG | HEIGHT: 77 IN | SYSTOLIC BLOOD PRESSURE: 124 MMHG | BODY MASS INDEX: 32.49 KG/M2 | HEART RATE: 75 BPM | OXYGEN SATURATION: 97 %

## 2020-02-14 DIAGNOSIS — R03.0 ELEVATED BLOOD PRESSURE READING: Primary | ICD-10-CM

## 2020-02-14 PROCEDURE — 99999 PR PBB SHADOW E&M-EST. PATIENT-LVL III: CPT | Mod: PBBFAC,,, | Performed by: FAMILY MEDICINE

## 2020-02-14 PROCEDURE — 99212 OFFICE O/P EST SF 10 MIN: CPT | Mod: S$GLB,,, | Performed by: FAMILY MEDICINE

## 2020-02-14 PROCEDURE — 99212 PR OFFICE/OUTPT VISIT, EST, LEVL II, 10-19 MIN: ICD-10-PCS | Mod: S$GLB,,, | Performed by: FAMILY MEDICINE

## 2020-02-14 PROCEDURE — 99999 PR PBB SHADOW E&M-EST. PATIENT-LVL III: ICD-10-PCS | Mod: PBBFAC,,, | Performed by: FAMILY MEDICINE

## 2020-02-14 NOTE — PROGRESS NOTES
Subjective:       Patient ID: Daniel Garcias is a 44 y.o. male.    Chief Complaint: Hypertension    Established patient follows up for management of chronic medical illnesses with complaints today. Please see dictation and ROS for interval problems, specific complaints and disease management discussion.    P, S, Fm, Soc Hx's; Meds, allergies reviewed and reconciled.  Health maintenance file reviewed and addressed items due.    DOT exam BP elevated. No sx.    Review of Systems   Constitutional: Negative for chills, fatigue, fever and unexpected weight change.   HENT: Negative for congestion and trouble swallowing.    Eyes: Negative for redness and visual disturbance.   Respiratory: Negative for cough, chest tightness and shortness of breath.    Cardiovascular: Negative for chest pain, palpitations and leg swelling.   Gastrointestinal: Negative for abdominal pain and blood in stool.   Genitourinary: Negative for difficulty urinating and hematuria.   Musculoskeletal: Negative for arthralgias, back pain, gait problem, joint swelling, myalgias and neck pain.   Skin: Negative for color change and rash.   Neurological: Negative for tremors, speech difficulty, weakness, numbness and headaches.   Hematological: Negative for adenopathy. Does not bruise/bleed easily.   Psychiatric/Behavioral: Negative for behavioral problems, confusion and sleep disturbance. The patient is not nervous/anxious.        Objective:      Physical Exam   Constitutional: He is oriented to person, place, and time. He appears well-developed and well-nourished. No distress.   Neck: Neck supple.   Pulmonary/Chest: Effort normal.   Musculoskeletal: He exhibits no edema.        Right lower leg: He exhibits no edema.        Left lower leg: He exhibits no edema.   Neurological: He is alert and oriented to person, place, and time.   Skin: Skin is warm and dry. No rash noted.   Psychiatric: He has a normal mood and affect. His behavior is normal. Judgment  and thought content normal.   Nursing note and vitals reviewed.      Assessment:       1. Elevated blood pressure reading        Plan:     Medication List with Changes/Refills   Current Medications    ATORVASTATIN (LIPITOR) 10 MG TABLET    Take 1 tablet (10 mg total) by mouth once daily.    DICLOFENAC SODIUM (VOLTAREN) 1 % GEL    Apply 2 g topically 4 (four) times daily. As needed to areas of scalp, head and neck that are in pain    METHYLPREDNISOLONE (MEDROL DOSEPACK) 4 MG TABLET    use as directed     Daniel was seen today for hypertension.    Diagnoses and all orders for this visit:    Elevated blood pressure reading      See meds, orders, follow up, routing and instructions sections of encounter and AVS. Discussed with patient and provided on AVS.    BP normal in office today - see letter.

## 2020-03-11 ENCOUNTER — TELEPHONE (OUTPATIENT)
Dept: INTERNAL MEDICINE | Facility: CLINIC | Age: 45
End: 2020-03-11

## 2020-03-11 NOTE — TELEPHONE ENCOUNTER
----- Message from Anali Pablo sent at 3/11/2020  3:21 PM CDT -----  Contact: Pt [phone]  Patient is returning a phone call.  Who left a message for the patient: Renzo  Does patient know what this is regarding:  Yes. He said the fax number isn't working so he come in tomorrow or Friday to pick the paperwork up.    Thank you

## 2020-04-07 ENCOUNTER — TELEPHONE (OUTPATIENT)
Dept: NEUROLOGY | Facility: CLINIC | Age: 45
End: 2020-04-07

## 2020-07-13 RX ORDER — ATORVASTATIN CALCIUM 10 MG/1
10 TABLET, FILM COATED ORAL DAILY
Qty: 90 TABLET | Refills: 1 | Status: SHIPPED | OUTPATIENT
Start: 2020-07-13 | End: 2021-02-11 | Stop reason: SDUPTHER

## 2020-07-13 NOTE — TELEPHONE ENCOUNTER
Refill Authorization Note    is requesting a refill authorization.    Brief assessment and rationale for refill: APPROVE: prr               Medication reconciliation completed: No                         Comments:      Requested Prescriptions   Signed Prescriptions Disp Refills    atorvastatin (LIPITOR) 10 MG tablet 90 tablet 1     Sig: Take 1 tablet (10 mg total) by mouth once daily.       Cardiovascular:  Antilipid - Statins Passed - 7/11/2020 12:31 PM        Passed - Patient is at least 18 years old        Passed - Office visit in past 12 months or future 90 days.     Recent Outpatient Visits            5 months ago Elevated blood pressure reading    [facility] Donte Izquierdo MD    8 months ago Trigeminal neuralgia    [place] [id number] Sapna Parra MD    12 months ago Mixed hyperlipidemia    [place] [id number] Sapna Parra MD    1 year ago Annual physical exam    [facility] Donte Izquierdo MD    1 year ago Trigeminal neuralgia    [place] [id number] Sapna Parra MD                    Passed - Lipid Panel completed in last 360 days     Lab Results   Component Value Date    CHOL 195 10/12/2019    HDL 46 10/12/2019    LDLCALC 134.8 10/12/2019    TRIG 71 10/12/2019             Passed - ALT is 94 or below and within 360 days     ALT   Date Value Ref Range Status   10/12/2019 16 10 - 44 U/L Final   06/29/2019 18 10 - 44 U/L Final   06/04/2018 15 10 - 44 U/L Final              Passed - AST is 54 or below and within 360 days     AST   Date Value Ref Range Status   10/12/2019 10 10 - 40 U/L Final   06/29/2019 13 10 - 40 U/L Final   06/04/2018 11 10 - 40 U/L Final                  Appointments  past 12m or future 3m with PCP    Date Provider   Last Visit   2/14/2020 Donte Izquierdo MD   Next Visit   Visit date not found Donte Izquierdo MD   ED visits in past 90 days: [unfilled]     Note composed:10:27 AM 07/13/2020

## 2021-01-09 ENCOUNTER — CLINICAL SUPPORT (OUTPATIENT)
Dept: URGENT CARE | Facility: CLINIC | Age: 46
End: 2021-01-09
Payer: COMMERCIAL

## 2021-01-09 DIAGNOSIS — Z78.9 NO KNOWN HEALTH PROBLEMS: Primary | ICD-10-CM

## 2021-01-09 LAB
CTP QC/QA: YES
SARS-COV-2 RDRP RESP QL NAA+PROBE: NEGATIVE

## 2021-01-09 PROCEDURE — 99211 OFF/OP EST MAY X REQ PHY/QHP: CPT | Mod: S$GLB,,, | Performed by: NURSE PRACTITIONER

## 2021-01-09 PROCEDURE — U0002: ICD-10-PCS | Mod: QW,S$GLB,, | Performed by: NURSE PRACTITIONER

## 2021-01-09 PROCEDURE — 99211 PR OFFICE/OUTPT VISIT, EST, LEVL I: ICD-10-PCS | Mod: S$GLB,,, | Performed by: NURSE PRACTITIONER

## 2021-01-09 PROCEDURE — U0002 COVID-19 LAB TEST NON-CDC: HCPCS | Mod: QW,S$GLB,, | Performed by: NURSE PRACTITIONER

## 2021-02-11 ENCOUNTER — OFFICE VISIT (OUTPATIENT)
Dept: INTERNAL MEDICINE | Facility: CLINIC | Age: 46
End: 2021-02-11
Attending: FAMILY MEDICINE
Payer: COMMERCIAL

## 2021-02-11 ENCOUNTER — LAB VISIT (OUTPATIENT)
Dept: LAB | Facility: HOSPITAL | Age: 46
End: 2021-02-11
Attending: FAMILY MEDICINE
Payer: COMMERCIAL

## 2021-02-11 VITALS
DIASTOLIC BLOOD PRESSURE: 80 MMHG | BODY MASS INDEX: 32.99 KG/M2 | TEMPERATURE: 98 F | HEART RATE: 61 BPM | RESPIRATION RATE: 18 BRPM | HEIGHT: 77 IN | WEIGHT: 279.44 LBS | OXYGEN SATURATION: 98 % | SYSTOLIC BLOOD PRESSURE: 120 MMHG

## 2021-02-11 DIAGNOSIS — Z00.00 ANNUAL PHYSICAL EXAM: ICD-10-CM

## 2021-02-11 DIAGNOSIS — R51.9 NONINTRACTABLE HEADACHE, UNSPECIFIED CHRONICITY PATTERN, UNSPECIFIED HEADACHE TYPE: ICD-10-CM

## 2021-02-11 DIAGNOSIS — E78.5 HYPERLIPIDEMIA, UNSPECIFIED HYPERLIPIDEMIA TYPE: ICD-10-CM

## 2021-02-11 DIAGNOSIS — Z00.00 ANNUAL PHYSICAL EXAM: Primary | ICD-10-CM

## 2021-02-11 DIAGNOSIS — Z12.5 PROSTATE CANCER SCREENING: ICD-10-CM

## 2021-02-11 LAB
ALBUMIN SERPL BCP-MCNC: 4 G/DL (ref 3.5–5.2)
ALP SERPL-CCNC: 47 U/L (ref 55–135)
ALT SERPL W/O P-5'-P-CCNC: 24 U/L (ref 10–44)
ANION GAP SERPL CALC-SCNC: 8 MMOL/L (ref 8–16)
AST SERPL-CCNC: 15 U/L (ref 10–40)
BILIRUB SERPL-MCNC: 0.9 MG/DL (ref 0.1–1)
BUN SERPL-MCNC: 11 MG/DL (ref 6–20)
CALCIUM SERPL-MCNC: 9 MG/DL (ref 8.7–10.5)
CHLORIDE SERPL-SCNC: 104 MMOL/L (ref 95–110)
CHOLEST SERPL-MCNC: 197 MG/DL (ref 120–199)
CHOLEST/HDLC SERPL: 3.7 {RATIO} (ref 2–5)
CO2 SERPL-SCNC: 31 MMOL/L (ref 23–29)
COMPLEXED PSA SERPL-MCNC: 0.58 NG/ML (ref 0–4)
CREAT SERPL-MCNC: 1.1 MG/DL (ref 0.5–1.4)
EST. GFR  (AFRICAN AMERICAN): >60 ML/MIN/1.73 M^2
EST. GFR  (NON AFRICAN AMERICAN): >60 ML/MIN/1.73 M^2
GLUCOSE SERPL-MCNC: 89 MG/DL (ref 70–110)
HDLC SERPL-MCNC: 53 MG/DL (ref 40–75)
HDLC SERPL: 26.9 % (ref 20–50)
LDLC SERPL CALC-MCNC: 124.6 MG/DL (ref 63–159)
NONHDLC SERPL-MCNC: 144 MG/DL
POTASSIUM SERPL-SCNC: 4.6 MMOL/L (ref 3.5–5.1)
PROT SERPL-MCNC: 7.1 G/DL (ref 6–8.4)
SARS-COV-2 IGG SERPLBLD QL IA.RAPID: NEGATIVE
SODIUM SERPL-SCNC: 143 MMOL/L (ref 136–145)
TRIGL SERPL-MCNC: 97 MG/DL (ref 30–150)

## 2021-02-11 PROCEDURE — 84153 ASSAY OF PSA TOTAL: CPT

## 2021-02-11 PROCEDURE — 99396 PR PREVENTIVE VISIT,EST,40-64: ICD-10-PCS | Mod: S$GLB,,, | Performed by: FAMILY MEDICINE

## 2021-02-11 PROCEDURE — 86769 SARS-COV-2 COVID-19 ANTIBODY: CPT

## 2021-02-11 PROCEDURE — 80061 LIPID PANEL: CPT

## 2021-02-11 PROCEDURE — 99999 PR PBB SHADOW E&M-EST. PATIENT-LVL V: CPT | Mod: PBBFAC,,, | Performed by: FAMILY MEDICINE

## 2021-02-11 PROCEDURE — 80053 COMPREHEN METABOLIC PANEL: CPT

## 2021-02-11 PROCEDURE — 99999 PR PBB SHADOW E&M-EST. PATIENT-LVL V: ICD-10-PCS | Mod: PBBFAC,,, | Performed by: FAMILY MEDICINE

## 2021-02-11 PROCEDURE — 99396 PREV VISIT EST AGE 40-64: CPT | Mod: S$GLB,,, | Performed by: FAMILY MEDICINE

## 2021-02-11 RX ORDER — ATORVASTATIN CALCIUM 10 MG/1
10 TABLET, FILM COATED ORAL DAILY
Qty: 90 TABLET | Refills: 3 | Status: SHIPPED | OUTPATIENT
Start: 2021-02-11 | End: 2021-04-30

## 2021-02-11 RX ORDER — AMITRIPTYLINE HYDROCHLORIDE 10 MG/1
10 TABLET, FILM COATED ORAL NIGHTLY PRN
Qty: 30 TABLET | Refills: 0 | Status: SHIPPED | OUTPATIENT
Start: 2021-02-11 | End: 2021-07-27

## 2021-02-12 ENCOUNTER — TELEPHONE (OUTPATIENT)
Dept: NEUROLOGY | Facility: CLINIC | Age: 46
End: 2021-02-12

## 2021-04-19 ENCOUNTER — PATIENT OUTREACH (OUTPATIENT)
Dept: ADMINISTRATIVE | Facility: OTHER | Age: 46
End: 2021-04-19

## 2021-04-20 ENCOUNTER — OFFICE VISIT (OUTPATIENT)
Dept: NEUROLOGY | Facility: CLINIC | Age: 46
End: 2021-04-20
Attending: PSYCHIATRY & NEUROLOGY
Payer: COMMERCIAL

## 2021-04-20 VITALS
HEART RATE: 60 BPM | SYSTOLIC BLOOD PRESSURE: 156 MMHG | DIASTOLIC BLOOD PRESSURE: 91 MMHG | TEMPERATURE: 98 F | HEIGHT: 77 IN | WEIGHT: 284.38 LBS | BODY MASS INDEX: 33.58 KG/M2

## 2021-04-20 DIAGNOSIS — M54.81 OCCIPITAL NEURALGIA OF RIGHT SIDE: ICD-10-CM

## 2021-04-20 DIAGNOSIS — R51.9 NONINTRACTABLE HEADACHE, UNSPECIFIED CHRONICITY PATTERN, UNSPECIFIED HEADACHE TYPE: ICD-10-CM

## 2021-04-20 DIAGNOSIS — G50.0 TRIGEMINAL NEURALGIA: ICD-10-CM

## 2021-04-20 DIAGNOSIS — E55.9 VITAMIN D DEFICIENCY: ICD-10-CM

## 2021-04-20 DIAGNOSIS — S16.1XXS STRAIN OF NECK MUSCLE, SEQUELA: ICD-10-CM

## 2021-04-20 DIAGNOSIS — M54.31 SCIATICA OF RIGHT SIDE: ICD-10-CM

## 2021-04-20 DIAGNOSIS — E78.2 MIXED HYPERLIPIDEMIA: ICD-10-CM

## 2021-04-20 PROBLEM — S16.1XXA CERVICAL STRAIN: Status: ACTIVE | Noted: 2021-04-20

## 2021-04-20 PROCEDURE — 64400 NJX AA&/STRD TRIGEMINAL NRV: CPT | Mod: 59,RT,S$GLB, | Performed by: PSYCHIATRY & NEUROLOGY

## 2021-04-20 PROCEDURE — 64400: ICD-10-PCS | Mod: 59,RT,S$GLB, | Performed by: PSYCHIATRY & NEUROLOGY

## 2021-04-20 PROCEDURE — 99999 PR PBB SHADOW E&M-EST. PATIENT-LVL III: CPT | Mod: PBBFAC,,, | Performed by: PSYCHIATRY & NEUROLOGY

## 2021-04-20 PROCEDURE — 99999 PR PBB SHADOW E&M-EST. PATIENT-LVL III: ICD-10-PCS | Mod: PBBFAC,,, | Performed by: PSYCHIATRY & NEUROLOGY

## 2021-04-20 PROCEDURE — 64405 NJX AA&/STRD GR OCPL NRV: CPT | Mod: 59,RT,S$GLB, | Performed by: PSYCHIATRY & NEUROLOGY

## 2021-04-20 PROCEDURE — 99214 OFFICE O/P EST MOD 30 MIN: CPT | Mod: 25,S$GLB,, | Performed by: PSYCHIATRY & NEUROLOGY

## 2021-04-20 PROCEDURE — 99214 PR OFFICE/OUTPT VISIT, EST, LEVL IV, 30-39 MIN: ICD-10-PCS | Mod: 25,S$GLB,, | Performed by: PSYCHIATRY & NEUROLOGY

## 2021-04-20 PROCEDURE — 64405 PR NERVE BLOCK INJ, ANES/STEROID, OCCIPITAL: ICD-10-PCS | Mod: 59,RT,S$GLB, | Performed by: PSYCHIATRY & NEUROLOGY

## 2021-04-20 PROCEDURE — 20553 PR INJECT TRIGGER POINTS, > 3: ICD-10-PCS | Mod: S$GLB,,, | Performed by: PSYCHIATRY & NEUROLOGY

## 2021-04-20 PROCEDURE — 20553 NJX 1/MLT TRIGGER POINTS 3/>: CPT | Mod: S$GLB,,, | Performed by: PSYCHIATRY & NEUROLOGY

## 2021-04-28 DIAGNOSIS — E78.5 HYPERLIPIDEMIA, UNSPECIFIED HYPERLIPIDEMIA TYPE: ICD-10-CM

## 2021-04-30 RX ORDER — ATORVASTATIN CALCIUM 10 MG/1
TABLET, FILM COATED ORAL
Qty: 90 TABLET | Refills: 0 | Status: SHIPPED | OUTPATIENT
Start: 2021-04-30 | End: 2022-07-27 | Stop reason: SDUPTHER

## 2021-05-03 ENCOUNTER — PATIENT MESSAGE (OUTPATIENT)
Dept: NEUROLOGY | Facility: CLINIC | Age: 46
End: 2021-05-03

## 2021-05-03 DIAGNOSIS — G44.091 OTHER INTRACTABLE TRIGEMINAL AUTONOMIC CEPHALGIA (TAC): ICD-10-CM

## 2021-05-03 DIAGNOSIS — G50.0 TRIGEMINAL NEURALGIA: ICD-10-CM

## 2021-05-04 ENCOUNTER — TELEPHONE (OUTPATIENT)
Dept: NEUROLOGY | Facility: CLINIC | Age: 46
End: 2021-05-04

## 2021-05-04 RX ORDER — GABAPENTIN 100 MG/1
300 CAPSULE ORAL NIGHTLY
Qty: 90 CAPSULE | Refills: 11 | Status: SHIPPED | OUTPATIENT
Start: 2021-05-04 | End: 2021-07-27 | Stop reason: CLARIF

## 2021-05-21 ENCOUNTER — HOSPITAL ENCOUNTER (OUTPATIENT)
Dept: RADIOLOGY | Facility: HOSPITAL | Age: 46
Discharge: HOME | End: 2021-05-21
Attending: PSYCHIATRY & NEUROLOGY
Payer: COMMERCIAL

## 2021-05-21 DIAGNOSIS — G44.091 OTHER INTRACTABLE TRIGEMINAL AUTONOMIC CEPHALGIA (TAC): ICD-10-CM

## 2021-05-21 DIAGNOSIS — G50.0 TRIGEMINAL NEURALGIA: ICD-10-CM

## 2021-05-21 PROCEDURE — 70544 MR ANGIOGRAPHY HEAD W/O DYE: CPT | Mod: TC

## 2021-05-21 PROCEDURE — 70551 MRI BRAIN STEM W/O DYE: CPT | Mod: 26,,, | Performed by: RADIOLOGY

## 2021-05-21 PROCEDURE — 70547 MR ANGIOGRAPHY NECK W/O DYE: CPT | Mod: TC

## 2021-05-21 PROCEDURE — 70551 MRI BRAIN STEM W/O DYE: CPT | Mod: TC

## 2021-05-21 PROCEDURE — 70544 MR ANGIOGRAPHY HEAD W/O DYE: CPT | Mod: 26,59,, | Performed by: RADIOLOGY

## 2021-05-21 PROCEDURE — 70544 MRA BRAIN WITHOUT CONTRAST: ICD-10-PCS | Mod: 26,59,, | Performed by: RADIOLOGY

## 2021-05-21 PROCEDURE — 70551 MRI BRAIN WITHOUT CONTRAST: ICD-10-PCS | Mod: 26,,, | Performed by: RADIOLOGY

## 2021-05-21 PROCEDURE — 70547 MRA NECK WITHOUT CONTRAST: ICD-10-PCS | Mod: 26,,, | Performed by: RADIOLOGY

## 2021-05-21 PROCEDURE — 70547 MR ANGIOGRAPHY NECK W/O DYE: CPT | Mod: 26,,, | Performed by: RADIOLOGY

## 2021-05-24 ENCOUNTER — TELEPHONE (OUTPATIENT)
Dept: NEUROLOGY | Facility: CLINIC | Age: 46
End: 2021-05-24

## 2021-05-24 ENCOUNTER — PATIENT MESSAGE (OUTPATIENT)
Dept: NEUROLOGY | Facility: CLINIC | Age: 46
End: 2021-05-24

## 2021-07-26 ENCOUNTER — PATIENT OUTREACH (OUTPATIENT)
Dept: ADMINISTRATIVE | Facility: OTHER | Age: 46
End: 2021-07-26

## 2021-07-27 ENCOUNTER — OFFICE VISIT (OUTPATIENT)
Dept: NEUROLOGY | Facility: CLINIC | Age: 46
End: 2021-07-27
Attending: PSYCHIATRY & NEUROLOGY
Payer: COMMERCIAL

## 2021-07-27 VITALS
HEART RATE: 58 BPM | DIASTOLIC BLOOD PRESSURE: 87 MMHG | SYSTOLIC BLOOD PRESSURE: 156 MMHG | WEIGHT: 286.63 LBS | BODY MASS INDEX: 33.84 KG/M2 | HEIGHT: 77 IN

## 2021-07-27 DIAGNOSIS — G43.009 MIGRAINE WITHOUT AURA AND WITHOUT STATUS MIGRAINOSUS, NOT INTRACTABLE: Primary | ICD-10-CM

## 2021-07-27 DIAGNOSIS — E78.2 MIXED HYPERLIPIDEMIA: ICD-10-CM

## 2021-07-27 DIAGNOSIS — G50.0 TRIGEMINAL NEURALGIA: ICD-10-CM

## 2021-07-27 DIAGNOSIS — S16.1XXS STRAIN OF NECK MUSCLE, SEQUELA: ICD-10-CM

## 2021-07-27 DIAGNOSIS — E55.9 VITAMIN D DEFICIENCY: ICD-10-CM

## 2021-07-27 DIAGNOSIS — M54.31 SCIATICA OF RIGHT SIDE: ICD-10-CM

## 2021-07-27 PROCEDURE — 99999 PR PBB SHADOW E&M-EST. PATIENT-LVL II: CPT | Mod: PBBFAC,,, | Performed by: PSYCHIATRY & NEUROLOGY

## 2021-07-27 PROCEDURE — 99214 PR OFFICE/OUTPT VISIT, EST, LEVL IV, 30-39 MIN: ICD-10-PCS | Mod: S$GLB,,, | Performed by: PSYCHIATRY & NEUROLOGY

## 2021-07-27 PROCEDURE — 99999 PR PBB SHADOW E&M-EST. PATIENT-LVL II: ICD-10-PCS | Mod: PBBFAC,,, | Performed by: PSYCHIATRY & NEUROLOGY

## 2021-07-27 PROCEDURE — 99214 OFFICE O/P EST MOD 30 MIN: CPT | Mod: S$GLB,,, | Performed by: PSYCHIATRY & NEUROLOGY

## 2021-07-27 RX ORDER — INDOMETHACIN 50 MG/1
50 CAPSULE ORAL 2 TIMES DAILY PRN
Qty: 30 CAPSULE | Refills: 2 | Status: SHIPPED | OUTPATIENT
Start: 2021-07-27 | End: 2022-08-18

## 2021-10-26 ENCOUNTER — OFFICE VISIT (OUTPATIENT)
Dept: NEUROLOGY | Facility: CLINIC | Age: 46
End: 2021-10-26
Attending: PSYCHIATRY & NEUROLOGY
Payer: COMMERCIAL

## 2021-10-26 VITALS
WEIGHT: 288 LBS | HEART RATE: 95 BPM | DIASTOLIC BLOOD PRESSURE: 80 MMHG | BODY MASS INDEX: 34 KG/M2 | SYSTOLIC BLOOD PRESSURE: 138 MMHG | HEIGHT: 77 IN

## 2021-10-26 DIAGNOSIS — S16.1XXS STRAIN OF NECK MUSCLE, SEQUELA: ICD-10-CM

## 2021-10-26 DIAGNOSIS — G50.0 TRIGEMINAL NEURALGIA: ICD-10-CM

## 2021-10-26 DIAGNOSIS — M54.31 SCIATICA OF RIGHT SIDE: ICD-10-CM

## 2021-10-26 DIAGNOSIS — E78.2 MIXED HYPERLIPIDEMIA: ICD-10-CM

## 2021-10-26 DIAGNOSIS — E55.9 VITAMIN D DEFICIENCY: ICD-10-CM

## 2021-10-26 PROCEDURE — 99214 OFFICE O/P EST MOD 30 MIN: CPT | Mod: S$GLB,,, | Performed by: PSYCHIATRY & NEUROLOGY

## 2021-10-26 PROCEDURE — 99999 PR PBB SHADOW E&M-EST. PATIENT-LVL II: CPT | Mod: PBBFAC,,, | Performed by: PSYCHIATRY & NEUROLOGY

## 2021-10-26 PROCEDURE — 99999 PR PBB SHADOW E&M-EST. PATIENT-LVL II: ICD-10-PCS | Mod: PBBFAC,,, | Performed by: PSYCHIATRY & NEUROLOGY

## 2021-10-26 PROCEDURE — 99214 PR OFFICE/OUTPT VISIT, EST, LEVL IV, 30-39 MIN: ICD-10-PCS | Mod: S$GLB,,, | Performed by: PSYCHIATRY & NEUROLOGY

## 2021-11-23 PROBLEM — M54.12 CERVICAL RADICULOPATHY: Status: RESOLVED | Noted: 2019-10-29 | Resolved: 2021-11-23

## 2021-11-23 PROBLEM — S16.1XXA CERVICAL STRAIN: Status: RESOLVED | Noted: 2021-04-20 | Resolved: 2021-11-23

## 2021-11-23 PROBLEM — M54.31 SCIATICA OF RIGHT SIDE: Status: RESOLVED | Noted: 2019-04-23 | Resolved: 2021-11-23

## 2021-11-23 PROBLEM — R51.9 INTRACTABLE EPISODIC HEADACHE: Status: RESOLVED | Noted: 2019-04-22 | Resolved: 2021-11-23

## 2021-11-23 PROBLEM — Z98.890 HISTORY OF REPAIR OF ACL: Status: RESOLVED | Noted: 2019-06-24 | Resolved: 2021-11-23

## 2021-11-24 ENCOUNTER — OFFICE VISIT (OUTPATIENT)
Dept: INTERNAL MEDICINE | Facility: CLINIC | Age: 46
End: 2021-11-24
Payer: COMMERCIAL

## 2021-11-24 VITALS
HEIGHT: 76 IN | DIASTOLIC BLOOD PRESSURE: 88 MMHG | WEIGHT: 284 LBS | HEART RATE: 77 BPM | OXYGEN SATURATION: 97 % | SYSTOLIC BLOOD PRESSURE: 142 MMHG | BODY MASS INDEX: 34.58 KG/M2

## 2021-11-24 DIAGNOSIS — M54.31 RIGHT SIDED SCIATICA: Primary | ICD-10-CM

## 2021-11-24 PROCEDURE — 99999 PR PBB SHADOW E&M-EST. PATIENT-LVL IV: CPT | Mod: PBBFAC,,, | Performed by: INTERNAL MEDICINE

## 2021-11-24 PROCEDURE — 99213 OFFICE O/P EST LOW 20 MIN: CPT | Mod: S$GLB,,, | Performed by: INTERNAL MEDICINE

## 2021-11-24 PROCEDURE — 99213 PR OFFICE/OUTPT VISIT, EST, LEVL III, 20-29 MIN: ICD-10-PCS | Mod: S$GLB,,, | Performed by: INTERNAL MEDICINE

## 2021-11-24 PROCEDURE — 99999 PR PBB SHADOW E&M-EST. PATIENT-LVL IV: ICD-10-PCS | Mod: PBBFAC,,, | Performed by: INTERNAL MEDICINE

## 2022-02-22 ENCOUNTER — TELEPHONE (OUTPATIENT)
Dept: NEUROLOGY | Facility: CLINIC | Age: 47
End: 2022-02-22
Payer: COMMERCIAL

## 2022-02-22 NOTE — TELEPHONE ENCOUNTER
----- Message from Brooklyn Flowers sent at 2/22/2022  1:29 PM CST -----  Type: Patient Call Back    Who called: self    What is the request in detail: patient would like to reschedule his appt. Please call    Can the clinic reply by [facility]? no    Would the patient rather a call back or a response via [facility]? call    Best call back number:.[phone] (home)

## 2022-03-16 ENCOUNTER — TELEPHONE (OUTPATIENT)
Dept: SLEEP MEDICINE | Facility: CLINIC | Age: 47
End: 2022-03-16
Payer: COMMERCIAL

## 2022-03-16 NOTE — TELEPHONE ENCOUNTER
Spoke to the pt and informed him that we will call him tomorrow to schedule his appt. Staff do not have access to schedule

## 2022-07-18 ENCOUNTER — PATIENT MESSAGE (OUTPATIENT)
Dept: NEUROLOGY | Facility: CLINIC | Age: 47
End: 2022-07-18
Payer: COMMERCIAL

## 2022-07-18 DIAGNOSIS — R20.0 RIGHT LEG NUMBNESS: Primary | ICD-10-CM

## 2022-07-18 NOTE — TELEPHONE ENCOUNTER
Spoke with patient scheduled next available appointment but if physician advises otherwise patient will be contacted

## 2022-07-19 ENCOUNTER — TELEPHONE (OUTPATIENT)
Dept: NEUROLOGY | Facility: CLINIC | Age: 47
End: 2022-07-19
Payer: COMMERCIAL

## 2022-07-19 ENCOUNTER — OFFICE VISIT (OUTPATIENT)
Dept: NEUROLOGY | Facility: CLINIC | Age: 47
End: 2022-07-19
Attending: PSYCHIATRY & NEUROLOGY
Payer: COMMERCIAL

## 2022-07-19 DIAGNOSIS — R20.0 RIGHT LEG NUMBNESS: ICD-10-CM

## 2022-07-19 PROCEDURE — 99442 PR PHYSICIAN TELEPHONE EVALUATION 11-20 MIN: CPT | Mod: 95,,, | Performed by: PSYCHIATRY & NEUROLOGY

## 2022-07-19 PROCEDURE — 99442 PR PHYSICIAN TELEPHONE EVALUATION 11-20 MIN: ICD-10-PCS | Mod: 95,,, | Performed by: PSYCHIATRY & NEUROLOGY

## 2022-07-19 NOTE — TELEPHONE ENCOUNTER
----- Message from Garima Barker sent at 7/19/2022 11:11 AM CDT -----  Who Called:PT        Who Left Message for Patient:Sapna Parra        Does the patient know what this is regarding?:Yes        Best Call Back Number:[phone]        Additional Information:

## 2022-07-27 ENCOUNTER — LAB VISIT (OUTPATIENT)
Dept: LAB | Facility: OTHER | Age: 47
End: 2022-07-27
Attending: PSYCHIATRY & NEUROLOGY
Payer: COMMERCIAL

## 2022-07-27 DIAGNOSIS — R20.0 RIGHT LEG NUMBNESS: ICD-10-CM

## 2022-07-27 DIAGNOSIS — E78.5 HYPERLIPIDEMIA, UNSPECIFIED HYPERLIPIDEMIA TYPE: ICD-10-CM

## 2022-07-27 LAB
ALBUMIN SERPL BCP-MCNC: 4 G/DL (ref 3.5–5.2)
ALP SERPL-CCNC: 48 U/L (ref 55–135)
ALT SERPL W/O P-5'-P-CCNC: 21 U/L (ref 10–44)
ANION GAP SERPL CALC-SCNC: 9 MMOL/L (ref 8–16)
AST SERPL-CCNC: 11 U/L (ref 10–40)
BASOPHILS # BLD AUTO: 0.02 K/UL (ref 0–0.2)
BASOPHILS NFR BLD: 0.4 % (ref 0–1.9)
BILIRUB SERPL-MCNC: 1 MG/DL (ref 0.1–1)
BUN SERPL-MCNC: 11 MG/DL (ref 6–20)
CALCIUM SERPL-MCNC: 9.2 MG/DL (ref 8.7–10.5)
CHLORIDE SERPL-SCNC: 105 MMOL/L (ref 95–110)
CHOLEST SERPL-MCNC: 281 MG/DL (ref 120–199)
CHOLEST/HDLC SERPL: 5 {RATIO} (ref 2–5)
CO2 SERPL-SCNC: 29 MMOL/L (ref 23–29)
CREAT SERPL-MCNC: 1.1 MG/DL (ref 0.5–1.4)
DIFFERENTIAL METHOD: NORMAL
EOSINOPHIL # BLD AUTO: 0.1 K/UL (ref 0–0.5)
EOSINOPHIL NFR BLD: 1 % (ref 0–8)
ERYTHROCYTE [DISTWIDTH] IN BLOOD BY AUTOMATED COUNT: 13.5 % (ref 11.5–14.5)
ERYTHROCYTE [SEDIMENTATION RATE] IN BLOOD: 4 MM/HR (ref 0–10)
EST. GFR  (AFRICAN AMERICAN): >60 ML/MIN/1.73 M^2
EST. GFR  (NON AFRICAN AMERICAN): >60 ML/MIN/1.73 M^2
ESTIMATED AVG GLUCOSE: 105 MG/DL (ref 68–131)
GLUCOSE SERPL-MCNC: 94 MG/DL (ref 70–110)
HBA1C MFR BLD: 5.3 % (ref 4–5.6)
HCT VFR BLD AUTO: 46.6 % (ref 40–54)
HDLC SERPL-MCNC: 56 MG/DL (ref 40–75)
HDLC SERPL: 19.9 % (ref 20–50)
HGB BLD-MCNC: 15.6 G/DL (ref 14–18)
IMM GRANULOCYTES # BLD AUTO: 0 K/UL (ref 0–0.04)
IMM GRANULOCYTES NFR BLD AUTO: 0 % (ref 0–0.5)
LDLC SERPL CALC-MCNC: 206.6 MG/DL (ref 63–159)
LYMPHOCYTES # BLD AUTO: 1.6 K/UL (ref 1–4.8)
LYMPHOCYTES NFR BLD: 32.9 % (ref 18–48)
MCH RBC QN AUTO: 29.1 PG (ref 27–31)
MCHC RBC AUTO-ENTMCNC: 33.5 G/DL (ref 32–36)
MCV RBC AUTO: 87 FL (ref 82–98)
MONOCYTES # BLD AUTO: 0.3 K/UL (ref 0.3–1)
MONOCYTES NFR BLD: 5.6 % (ref 4–15)
NEUTROPHILS # BLD AUTO: 2.9 K/UL (ref 1.8–7.7)
NEUTROPHILS NFR BLD: 60.1 % (ref 38–73)
NONHDLC SERPL-MCNC: 225 MG/DL
NRBC BLD-RTO: 0 /100 WBC
PLATELET # BLD AUTO: 285 K/UL (ref 150–450)
PMV BLD AUTO: 9.6 FL (ref 9.2–12.9)
POTASSIUM SERPL-SCNC: 3.9 MMOL/L (ref 3.5–5.1)
PROT SERPL-MCNC: 7.5 G/DL (ref 6–8.4)
RBC # BLD AUTO: 5.36 M/UL (ref 4.6–6.2)
RHEUMATOID FACT SERPL-ACNC: <13 IU/ML (ref 0–15)
RPR SER QL: NORMAL
SODIUM SERPL-SCNC: 143 MMOL/L (ref 136–145)
TRIGL SERPL-MCNC: 92 MG/DL (ref 30–150)
TSH SERPL DL<=0.005 MIU/L-ACNC: 0.62 UIU/ML (ref 0.4–4)
VIT B12 SERPL-MCNC: 280 PG/ML (ref 210–950)
WBC # BLD AUTO: 4.8 K/UL (ref 3.9–12.7)

## 2022-07-27 PROCEDURE — 36415 COLL VENOUS BLD VENIPUNCTURE: CPT | Performed by: PSYCHIATRY & NEUROLOGY

## 2022-07-27 PROCEDURE — 83036 HEMOGLOBIN GLYCOSYLATED A1C: CPT | Performed by: PSYCHIATRY & NEUROLOGY

## 2022-07-27 PROCEDURE — 82607 VITAMIN B-12: CPT | Performed by: PSYCHIATRY & NEUROLOGY

## 2022-07-27 PROCEDURE — 84165 PATHOLOGIST INTERPRETATION SPE: ICD-10-PCS | Mod: 26,,, | Performed by: PATHOLOGY

## 2022-07-27 PROCEDURE — 84165 PROTEIN E-PHORESIS SERUM: CPT | Performed by: PSYCHIATRY & NEUROLOGY

## 2022-07-27 PROCEDURE — 86431 RHEUMATOID FACTOR QUANT: CPT | Performed by: PSYCHIATRY & NEUROLOGY

## 2022-07-27 PROCEDURE — 85025 COMPLETE CBC W/AUTO DIFF WBC: CPT | Performed by: PSYCHIATRY & NEUROLOGY

## 2022-07-27 PROCEDURE — 85651 RBC SED RATE NONAUTOMATED: CPT | Performed by: PSYCHIATRY & NEUROLOGY

## 2022-07-27 PROCEDURE — 84443 ASSAY THYROID STIM HORMONE: CPT | Performed by: PSYCHIATRY & NEUROLOGY

## 2022-07-27 PROCEDURE — 80053 COMPREHEN METABOLIC PANEL: CPT | Performed by: PSYCHIATRY & NEUROLOGY

## 2022-07-27 PROCEDURE — 86038 ANTINUCLEAR ANTIBODIES: CPT | Performed by: PSYCHIATRY & NEUROLOGY

## 2022-07-27 PROCEDURE — 84165 PROTEIN E-PHORESIS SERUM: CPT | Mod: 26,,, | Performed by: PATHOLOGY

## 2022-07-27 PROCEDURE — 82300 ASSAY OF CADMIUM: CPT | Performed by: PSYCHIATRY & NEUROLOGY

## 2022-07-27 PROCEDURE — 80061 LIPID PANEL: CPT | Performed by: PSYCHIATRY & NEUROLOGY

## 2022-07-27 PROCEDURE — 86592 SYPHILIS TEST NON-TREP QUAL: CPT | Performed by: PSYCHIATRY & NEUROLOGY

## 2022-07-27 PROCEDURE — 87389 HIV-1 AG W/HIV-1&-2 AB AG IA: CPT | Performed by: PSYCHIATRY & NEUROLOGY

## 2022-07-27 NOTE — TELEPHONE ENCOUNTER
No new care gaps identified.  [facility] Embedded Care Gaps. Reference number: [id number]. 7/27/2022   4:45:43 PM CDT

## 2022-07-28 ENCOUNTER — PATIENT MESSAGE (OUTPATIENT)
Dept: NEUROLOGY | Facility: CLINIC | Age: 47
End: 2022-07-28
Payer: COMMERCIAL

## 2022-07-28 ENCOUNTER — TELEPHONE (OUTPATIENT)
Dept: GASTROENTEROLOGY | Facility: CLINIC | Age: 47
End: 2022-07-28
Payer: COMMERCIAL

## 2022-07-28 LAB
ALBUMIN SERPL ELPH-MCNC: 4.16 G/DL (ref 3.35–5.55)
ALPHA1 GLOB SERPL ELPH-MCNC: 0.27 G/DL (ref 0.17–0.41)
ALPHA2 GLOB SERPL ELPH-MCNC: 0.52 G/DL (ref 0.43–0.99)
ANA SER QL IF: NORMAL
ARSENIC BLD-MCNC: <1 NG/ML
B-GLOBULIN SERPL ELPH-MCNC: 0.81 G/DL (ref 0.5–1.1)
CADMIUM BLD-MCNC: 0.2 NG/ML
CITY: NORMAL
COUNTY: NORMAL
GAMMA GLOB SERPL ELPH-MCNC: 1.25 G/DL (ref 0.67–1.58)
GUARDIAN FIRST NAME: NORMAL
GUARDIAN LAST NAME: NORMAL
HOME PHONE: NORMAL
LEAD BLD-MCNC: <1 MCG/DL
MERCURY BLD-MCNC: <1 NG/ML
PROT SERPL-MCNC: 7 G/DL (ref 6–8.4)
RACE: NORMAL
STATE: NORMAL
STREET ADDRESS: NORMAL
VENOUS/CAPILLARY: NORMAL
ZIP: NORMAL

## 2022-07-28 RX ORDER — ATORVASTATIN CALCIUM 10 MG/1
10 TABLET, FILM COATED ORAL DAILY
Qty: 90 TABLET | Refills: 0 | Status: SHIPPED | OUTPATIENT
Start: 2022-07-28 | End: 2022-08-18

## 2022-07-28 NOTE — TELEPHONE ENCOUNTER
----- Message from Kristopher Durbin sent at 7/28/2022  9:41 AM CDT -----  .Type: Patient Call Back    Who called:self    What is the request in detail:patient wanted to get an appt but nothing is populating for dr bear's schedule please advise    Can the clinic reply by [facility]?no    Would the patient rather a call back or a response via [facility]? call    Best call back number:.[phone] (home)

## 2022-07-29 LAB
HIV 1+2 AB+HIV1 P24 AG SERPL QL IA: NEGATIVE
PATHOLOGIST INTERPRETATION SPE: NORMAL

## 2022-08-05 ENCOUNTER — TELEPHONE (OUTPATIENT)
Dept: GASTROENTEROLOGY | Facility: CLINIC | Age: 47
End: 2022-08-05
Payer: COMMERCIAL

## 2022-08-05 NOTE — TELEPHONE ENCOUNTER
----- Message from Bernice James sent at 8/5/2022 12:59 PM CDT -----  Regarding: self  .Type: Patient Call Back    Who called: self   What is the request in detail: checking on stat of previous message regarding colon screening. Please call     Can the clinic reply by [facility]?    Would the patient rather a call back or a response via [facility]?  Call     Best call back number: .[phone]

## 2022-08-05 NOTE — TELEPHONE ENCOUNTER
MA returned patient's call. Mr. Garcias is aware his repeat colonoscopy is due in 2023. Recall letter will be sent to remind patient to schedule his repeat colonoscopy,

## 2022-08-10 ENCOUNTER — HOSPITAL ENCOUNTER (OUTPATIENT)
Dept: RADIOLOGY | Facility: OTHER | Age: 47
Discharge: HOME | End: 2022-08-10
Attending: PSYCHIATRY & NEUROLOGY
Payer: COMMERCIAL

## 2022-08-10 DIAGNOSIS — R20.0 RIGHT LEG NUMBNESS: ICD-10-CM

## 2022-08-10 PROCEDURE — 72148 MRI LUMBAR SPINE WITHOUT CONTRAST: ICD-10-PCS | Mod: 26,,, | Performed by: RADIOLOGY

## 2022-08-10 PROCEDURE — 72148 MRI LUMBAR SPINE W/O DYE: CPT | Mod: TC

## 2022-08-10 PROCEDURE — 72148 MRI LUMBAR SPINE W/O DYE: CPT | Mod: 26,,, | Performed by: RADIOLOGY

## 2022-08-10 NOTE — PROGRESS NOTES
Tristan Nash,    Mr. Garcias is a pleasant gentleman who has had episodic lumbar radicular pian in the past that self-resolved or resolved with conservative measures like PT.  He now has had persistent neuropathic pain and +/- LLE mild weakness despite conservative measures.  Would you be able to review his image and let me know if its worthwhile for him to see you for surgical evaluation.  Thank you.  kS

## 2022-08-12 ENCOUNTER — PATIENT MESSAGE (OUTPATIENT)
Dept: NEUROLOGY | Facility: CLINIC | Age: 47
End: 2022-08-12
Payer: COMMERCIAL

## 2022-08-15 ENCOUNTER — TELEPHONE (OUTPATIENT)
Dept: NEUROSURGERY | Facility: CLINIC | Age: 47
End: 2022-08-15
Payer: COMMERCIAL

## 2022-08-15 DIAGNOSIS — R20.0 RIGHT LEG NUMBNESS: Primary | ICD-10-CM

## 2022-08-18 ENCOUNTER — OFFICE VISIT (OUTPATIENT)
Dept: INTERNAL MEDICINE | Facility: CLINIC | Age: 47
End: 2022-08-18
Attending: FAMILY MEDICINE
Payer: COMMERCIAL

## 2022-08-18 VITALS
WEIGHT: 291.56 LBS | DIASTOLIC BLOOD PRESSURE: 88 MMHG | HEIGHT: 76 IN | HEART RATE: 56 BPM | OXYGEN SATURATION: 99 % | SYSTOLIC BLOOD PRESSURE: 139 MMHG | BODY MASS INDEX: 35.5 KG/M2

## 2022-08-18 DIAGNOSIS — N52.9 ERECTILE DYSFUNCTION, UNSPECIFIED ERECTILE DYSFUNCTION TYPE: ICD-10-CM

## 2022-08-18 DIAGNOSIS — E78.5 HYPERLIPIDEMIA, UNSPECIFIED HYPERLIPIDEMIA TYPE: ICD-10-CM

## 2022-08-18 DIAGNOSIS — Z12.5 PROSTATE CANCER SCREENING: ICD-10-CM

## 2022-08-18 DIAGNOSIS — R20.0 RIGHT LEG NUMBNESS: ICD-10-CM

## 2022-08-18 DIAGNOSIS — Z00.00 ANNUAL PHYSICAL EXAM: Primary | ICD-10-CM

## 2022-08-18 PROCEDURE — 99999 PR PBB SHADOW E&M-EST. PATIENT-LVL IV: ICD-10-PCS | Mod: PBBFAC,,, | Performed by: FAMILY MEDICINE

## 2022-08-18 PROCEDURE — 99396 PREV VISIT EST AGE 40-64: CPT | Mod: S$GLB,,, | Performed by: FAMILY MEDICINE

## 2022-08-18 PROCEDURE — 99999 PR PBB SHADOW E&M-EST. PATIENT-LVL IV: CPT | Mod: PBBFAC,,, | Performed by: FAMILY MEDICINE

## 2022-08-18 PROCEDURE — 99396 PR PREVENTIVE VISIT,EST,40-64: ICD-10-PCS | Mod: S$GLB,,, | Performed by: FAMILY MEDICINE

## 2022-08-18 RX ORDER — ATORVASTATIN CALCIUM 10 MG/1
10 TABLET, FILM COATED ORAL DAILY
Qty: 90 TABLET | Refills: 3 | Status: SHIPPED | OUTPATIENT
Start: 2022-08-18 | End: 2022-12-02

## 2022-08-18 RX ORDER — SILDENAFIL 100 MG/1
100 TABLET, FILM COATED ORAL DAILY PRN
Qty: 30 TABLET | Refills: 2 | Status: SHIPPED | OUTPATIENT
Start: 2022-08-18

## 2022-08-18 NOTE — PATIENT INSTRUCTIONS
Schedule lab orders for 3 months.      Information about cholesterol, high blood pressure and healthy diet and activity recommendations can be found at the following links on the Internet:    http://www.nhlbi.nih.gov/health/health-topics/topics/hbc  http://www.nhlbi.nih.gov/health/educational/lose_wt/index.htm  Http://www.nhlbi.nih.gov/files/docs/public/heart/hbp_low.pdf  http://www.heart.org/HEARTORG/  http://diabetes.org/  https://www.cdc.gov/  Https://healthfinder.gov/  https://health.gov/dietaryguidelines/2015/guidelines/  https://health.gov/paguidelines/second-edition/pdf/Physical_Activity_Guidelines_2nd_edition.pdf

## 2022-08-18 NOTE — PROGRESS NOTES
Subjective:       Patient ID: Daniel Garcias is a 47 y.o. male.    Chief Complaint: Follow-up    Established patient for an annual wellness check/physical exam and also chronic disease management. Specific complaints - see dictation and please see ROS.  P, S, Fm, Soc Hx's; Meds, allergies reviewed and reconciled.  Health maintenance file reviewed and addressed items due. Recent applicable lab, imaging and cardiovascular results reviewed.  Problem list items reviewed and modified or added entries (in the overview section) may not be transcribed into this encounter note due to note writer format.    Concerns for lipids. Drawn by Dr. Parra recently. Started on lipitor in past, but not taking and resumed recently. ED sx since restarting.    Arm upcoming appointment with Neurosurgery concerning abnormal MRI scan.  Help symptoms approximately 2 months ago?    Review of Systems   Constitutional: Negative for appetite change, chills, diaphoresis, fatigue and fever.   HENT: Negative for congestion, postnasal drip, rhinorrhea, sore throat and trouble swallowing.    Eyes: Negative for visual disturbance.   Respiratory: Negative for cough, choking, chest tightness, shortness of breath and wheezing.    Cardiovascular: Negative for chest pain and leg swelling.   Gastrointestinal: Negative for abdominal distention, abdominal pain, diarrhea, nausea and vomiting.   Genitourinary: Negative for difficulty urinating and hematuria.        Ed   Musculoskeletal: Negative for arthralgias and myalgias.   Skin: Negative for rash.   Neurological: Positive for numbness. Negative for weakness, light-headedness and headaches.   Psychiatric/Behavioral: Negative for confusion and dysphoric mood.       Objective:      Physical Exam  Vitals and nursing note reviewed.   Constitutional:       Appearance: He is well-developed. He is not diaphoretic.   Eyes:      General: No scleral icterus.  Neck:      Thyroid: No thyromegaly.      Vascular: No  carotid bruit or JVD.      Trachea: No tracheal deviation.   Cardiovascular:      Rate and Rhythm: Normal rate and regular rhythm.      Heart sounds: Normal heart sounds. No murmur heard.    No friction rub. No gallop.   Pulmonary:      Effort: Pulmonary effort is normal. No respiratory distress.      Breath sounds: Normal breath sounds. No wheezing or rales.   Abdominal:      General: There is no distension.      Palpations: Abdomen is soft. There is no mass.      Tenderness: There is no abdominal tenderness. There is no guarding or rebound.   Musculoskeletal:      Cervical back: Normal range of motion and neck supple.   Lymphadenopathy:      Cervical: No cervical adenopathy.   Skin:     General: Skin is warm and dry.      Findings: No erythema or rash.   Neurological:      Mental Status: He is alert and oriented to person, place, and time.      Cranial Nerves: No cranial nerve deficit.      Motor: No tremor.      Coordination: Coordination normal.      Gait: Gait normal.   Psychiatric:         Behavior: Behavior normal.         Thought Content: Thought content normal.         Judgment: Judgment normal.         Assessment:       1. Annual physical exam    2. Hyperlipidemia, unspecified hyperlipidemia type    3. Right leg numbness    4. Prostate cancer screening    5. Erectile dysfunction, unspecified erectile dysfunction type        Plan:     Medication List with Changes/Refills   New Medications    SILDENAFIL (VIAGRA) 100 MG TABLET    Take 1 tablet (100 mg total) by mouth daily as needed for Erectile Dysfunction.   Changed and/or Refilled Medications    Modified Medication Previous Medication    ATORVASTATIN (LIPITOR) 10 MG TABLET atorvastatin (LIPITOR) 10 MG tablet       Take 1 tablet (10 mg total) by mouth once daily.    Take 1 tablet (10 mg total) by mouth once daily.   Discontinued Medications    INDOMETHACIN (INDOCIN) 50 MG CAPSULE    Take 1 capsule (50 mg total) by mouth 2 (two) times daily as needed (facial  pain/headache).     Daniel was seen today for follow-up.    Diagnoses and all orders for this visit:    Annual physical exam  -     Hepatitis C Antibody; Future    Hyperlipidemia, unspecified hyperlipidemia type  -     atorvastatin (LIPITOR) 10 MG tablet; Take 1 tablet (10 mg total) by mouth once daily.  -     Lipid Panel; Future  -     AST (SGOT); Future  -     ALT (SGPT); Future    Right leg numbness    Prostate cancer screening  -     PSA, Screening; Future    Erectile dysfunction, unspecified erectile dysfunction type  -     sildenafiL (VIAGRA) 100 MG tablet; Take 1 tablet (100 mg total) by mouth daily as needed for Erectile Dysfunction.      See meds, orders, follow up, routing and instructions sections of encounter and AVS. Discussed with patient and provided on AVS.    Lab Results   Component Value Date     07/27/2022    K 3.9 07/27/2022     07/27/2022    BUN 11 07/27/2022    CREATININE 1.1 07/27/2022    GLU 94 07/27/2022    HGBA1C 5.3 07/27/2022    MG 2.3 11/19/2007    AST 11 07/27/2022    ALT 21 07/27/2022    ALBUMIN 4.0 07/27/2022    PROT 7.5 07/27/2022    BILITOT 1.0 07/27/2022    CHOL 281 (H) 07/27/2022    HDL 56 07/27/2022    LDLCALC 206.6 (H) 07/27/2022    TRIG 92 07/27/2022    WBC 4.80 07/27/2022    HGB 15.6 07/27/2022    HCT 46.6 07/27/2022     07/27/2022    PSA 0.58 02/11/2021    TSH 0.616 07/27/2022         Discussed diet and exercise and links provided on AVS for detailed information.    Trial of Viagra.  Potential side effects discussed.  Recommend he get back on statin medication recheck laboratory in about 3 months

## 2022-08-26 NOTE — PROGRESS NOTES
1420 pt tolerated opdivo infusion without issue, pt to rtc 9/23/22, no distress noted upon d/c to home    Established Patient - Audio Only Telehealth Visit     The patient location is: home  The chief complaint leading to consultation is: right leg numbness  Visit type: Virtual visit with audio only (telephone)  Total time spent with patient: 15       The reason for the audio only service rather than synchronous audio and video virtual visit was related to technical difficulties or patient preference/necessity.     Each patient to whom I provide medical services by telemedicine is:  (1) informed of the relationship between the physician and patient and the respective role of any other health care provider with respect to management of the patient; and (2) notified that they may decline to receive medical services by telemedicine and may withdraw from such care at any time. Patient verbally consented to receive this service via voice-only telephone call.       HPI  45yo Male who  reports right leg numbness on his thigh with radiation to his toe occasionally.  He denies pain or weakness, specifically no pain in his back.  He denies falls or muscle loss.  He denies any medication or activity changes.  He has had episodes like this in the past but usually self resolve.  Current episode is persistent and impacting mobility.  No changes in bladder/bowel, sexual function.     Assessment and plan:    Likely peripheral, but potential central etiologies.   Potentially meralgia paresthetica, so advised to minimize use of belts and encouraged weight loss.  Will use above investigations to assess for other etiologies.      Will acquire MRI Lumbar spine w/o (due to seafood allergy).  Will also obtain lab work Serum glucose Serum protein electrophoresis Vitamin B12 level Anti-nuclear antibody Erythrocyte sedimentation rate Rapid plasma reagin (RPR) Glycohemoglobin  : HIV serology, Urine/blood for heavy metals, , Rheumatoid factor  Vitamin B1 (thiamine).    Will also order PT.        Will discuss referral to peripheral neurologist or  Back and Spine depending on results and course.        Pt acknowledged understanding of results and agreement with plan.                             This service was not originating from a related E/M service provided within the previous 7 days nor will  to an E/M service or procedure within the next 24 hours or my soonest available appointment.  Prevailing standard of care was able to be met in this audio-only visit.

## 2022-10-05 ENCOUNTER — TELEPHONE (OUTPATIENT)
Dept: NEUROSURGERY | Facility: CLINIC | Age: 47
End: 2022-10-05
Payer: COMMERCIAL

## 2022-10-06 ENCOUNTER — HOSPITAL ENCOUNTER (OUTPATIENT)
Dept: RADIOLOGY | Facility: HOSPITAL | Age: 47
Discharge: HOME | End: 2022-10-06
Attending: NEUROLOGICAL SURGERY
Payer: COMMERCIAL

## 2022-10-06 ENCOUNTER — OFFICE VISIT (OUTPATIENT)
Dept: NEUROSURGERY | Facility: CLINIC | Age: 47
End: 2022-10-06
Payer: COMMERCIAL

## 2022-10-06 VITALS
DIASTOLIC BLOOD PRESSURE: 88 MMHG | WEIGHT: 290 LBS | HEART RATE: 63 BPM | BODY MASS INDEX: 35.31 KG/M2 | SYSTOLIC BLOOD PRESSURE: 151 MMHG | HEIGHT: 76 IN

## 2022-10-06 DIAGNOSIS — R20.0 RIGHT LEG NUMBNESS: ICD-10-CM

## 2022-10-06 DIAGNOSIS — M54.16 LUMBAR RADICULOPATHY: Primary | ICD-10-CM

## 2022-10-06 PROCEDURE — 72082 X-RAY EXAM ENTIRE SPI 2/3 VW: CPT | Mod: TC

## 2022-10-06 PROCEDURE — 72082 XR SCOLIOSIS COMPLETE: ICD-10-PCS | Mod: 26,,, | Performed by: RADIOLOGY

## 2022-10-06 PROCEDURE — 99213 OFFICE O/P EST LOW 20 MIN: CPT | Mod: S$GLB,,, | Performed by: NEUROLOGICAL SURGERY

## 2022-10-06 PROCEDURE — 99999 PR PBB SHADOW E&M-EST. PATIENT-LVL III: ICD-10-PCS | Mod: PBBFAC,,, | Performed by: NEUROLOGICAL SURGERY

## 2022-10-06 PROCEDURE — 72120 X-RAY BEND ONLY L-S SPINE: CPT | Mod: 26,,, | Performed by: RADIOLOGY

## 2022-10-06 PROCEDURE — 72082 X-RAY EXAM ENTIRE SPI 2/3 VW: CPT | Mod: 26,,, | Performed by: RADIOLOGY

## 2022-10-06 PROCEDURE — 99999 PR PBB SHADOW E&M-EST. PATIENT-LVL III: CPT | Mod: PBBFAC,,, | Performed by: NEUROLOGICAL SURGERY

## 2022-10-06 PROCEDURE — 72120 X-RAY BEND ONLY L-S SPINE: CPT | Mod: TC,59

## 2022-10-06 PROCEDURE — 72120 XR LUMBAR SPINE FLEXION AND EXTENSION ONLY: ICD-10-PCS | Mod: 26,,, | Performed by: RADIOLOGY

## 2022-10-06 PROCEDURE — 99213 PR OFFICE/OUTPT VISIT, EST, LEVL III, 20-29 MIN: ICD-10-PCS | Mod: S$GLB,,, | Performed by: NEUROLOGICAL SURGERY

## 2022-10-06 RX ORDER — METHYLPREDNISOLONE 4 MG/1
TABLET ORAL
Qty: 21 EACH | Refills: 0 | Status: SHIPPED | OUTPATIENT
Start: 2022-10-06 | End: 2022-10-27

## 2022-10-06 RX ORDER — PREGABALIN 75 MG/1
75 CAPSULE ORAL 2 TIMES DAILY
Qty: 60 CAPSULE | Refills: 6 | Status: SHIPPED | OUTPATIENT
Start: 2022-10-06 | End: 2022-10-06

## 2022-10-06 RX ORDER — PREGABALIN 75 MG/1
75 CAPSULE ORAL 2 TIMES DAILY
Qty: 60 CAPSULE | Refills: 1 | Status: SHIPPED | OUTPATIENT
Start: 2022-10-06 | End: 2023-01-24 | Stop reason: CLARIF

## 2022-10-06 NOTE — PROGRESS NOTES
"Neurosurgery Clinic Progress Note    10/06/2022      Subjective:    CC: RLE radiculopathy       HPI: Daniel Garcias is 47 y.o. male without significant PMHx who referred to clinic by  with RLE radicular pain.  He has been having right lower extremity L5 and S1 for the foot pain for the past 4 months.  The pain is progressive and every day.  Pain on average 5/10.  It gets 8 when severe and 3 when it is mild.  Pain increased with standing and sitting for prolonged period of time.  No specific aggravating factors.  He denies any leg weakness.  He reports some right foot numbness and tingling.  He denies any bowel bladder dysfunction. He denies any significant back pain.  He has not tried any conservative management at this stage.        ROS:   Pertinent items are noted in HPI.    PMH: Daniel Garcias has a past medical history of Cervical radiculopathy (10/29/2019), Cervical strain (4/20/2021), History of repair of ACL - 1994, meniscus 2007 (6/24/2019), Intractable episodic headache (4/22/2019), and Sciatica of right side (4/23/2019). Patient [unfilled]    [facility]: Daniel Garcias has a past surgical history that includes Knee arthroscopy w/ meniscal repair (1998); Knee arthroscopy w/ ACL reconstruction; and Colonoscopy (N/A, 4/11/2018).     Social History: Daniel Garcias reports that he has never smoked. He has never used smokeless tobacco. He reports that he does not drink alcohol and does not use drugs.    Allergies: Daniel Garcias is allergic to shellfish containing products.     FH: Daniel Garcias family history includes Cancer in his maternal grandmother; Hyperlipidemia in his father; Hypertension in his mother.    Medications: atorvastatin, methylPREDNISolone, pregabalin, and sildenafiL   Objective:    Vitals:    10/06/22 1526   BP: (!) 151/88   BP Location: Right arm   Patient Position: Sitting   BP Method: Large (Automatic)   Pulse: 63   Weight: 131.5 kg (290 lb)   Height: 6' 4" (1.93 m) "     Neurosurgery Physical Exam  General: well developed, well nourished, no distress.   Head: normocephalic, atraumatic  Neurologic: Alert and oriented. Thought content appropriate.  GCS: Motor: 6/Verbal: 5/Eyes: 4 GCS Total: 15  Mental Status: Awake, Alert, Oriented x 4  Language: No aphasia  Speech: No dysarthria  Cranial nerves: face symmetric, tongue midline, CN II-XII grossly intact.   Eyes: pupils equal, round, reactive to light with accomodation, EOMI.   Pulmonary: normal respirations, no signs of respiratory distress  Abdomen: soft, non-distended  Skin: Skin is warm, dry and intact.  Sensory: intact to light touch throughout  Motor Strength:Moves all extremities spontaneously with good tone.  Full strength upper and lower extremities. No abnormal movements seen.      Strength   Deltoids  Triceps Biceps  WE      WF    HG  Upper: R 5/5 5/5 5/5 5/5 5/5 5/5    L 5/5 5/5 5/5 5/5 5/5 5/5      HF KE KF DF PF EHL  Lower: R 5/5 5/5 5/5 5/5 5/5 3/5    L 4+/5 5/5 5/5 5/5 5/5 4/5     Reflexes:   Pedro's: Negative.  Clonus: Negative.     Cerebellar:  Gait stable, antalgic     Cervical:   ROM: Pain limited with flexion, extension, lateral rotation and ear-to-shoulder bend.   Midline TTP: Negative.     Thoracic:  Midline TTP: Negative.     Lumbar:  Midline TTP: Positive  Straight Leg Test: Positive R>L     Diagnostic Results:    L spine MRI 07/19/22:  Shows multilevel lumbar degenerative disc herniation L2  down to S1 with foraminal, lateral recess, and foraminal stenosis worst at L5-S1 with right-sided lateral recess stenosis causing S1 nerve root compression as well as foraminal stenosis causing L5 nerve root compression.    Scoliosis films  8/15/22:   Good sagittal and coronal alignment.  Lumbar lordosis of 40°.  Pelvic incidence of 60°.  20° of spinal pelvic mismatch.    L spine flex/ex 8/15/22  Without significant dynamic instability            Assessment: Daniel MADRIGAL Katerine is 47 y.o. male with RLE L5 and S1  radicular pain. He has not tried any conservative management at this stage.  We had a long discussion with him about the natural history of disc herniation.  We had a long discussion also about medical and surgical options.  Ample time given for questions.            ICD-10-CM ICD-9-CM    1. Lumbar radiculopathy  M54.16 724.4 Ambulatory referral/consult to Physical/Occupational Therapy      IR SEAN Lumbar      Procedure Order to Pain Management           Plan:  Conservative management trial with medrol dose pack and Lyrica 75 BID  Physical therapy   L5-S1 SEAN and foraminal injections  Follow up in 3 months   If failed medical management will need MIS Right L5-S1 laminectomy and microdiskectomy       Roxane Corbett MD  10/06/2022  4:21 PM

## 2022-10-10 ENCOUNTER — TELEPHONE (OUTPATIENT)
Dept: PAIN MEDICINE | Facility: OTHER | Age: 47
End: 2022-10-10
Payer: COMMERCIAL

## 2022-10-10 ENCOUNTER — PATIENT MESSAGE (OUTPATIENT)
Dept: PAIN MEDICINE | Facility: OTHER | Age: 47
End: 2022-10-10
Payer: COMMERCIAL

## 2022-10-10 DIAGNOSIS — M54.16 LUMBAR RADICULOPATHY: Primary | ICD-10-CM

## 2022-10-10 NOTE — TELEPHONE ENCOUNTER
Spoke to patient to schedule direct referral procedure. Patient is scheduled on 10/21/22 at 10:00 AM with Dr. Prather. Patient was offered an opportunity to be scheduled in the Pain Management Clinic for a consult with the performing provider before scheduling. Patient declined provider consult. Date, time, and instructions for procedure given to patient. Patient verbalized understanding.

## 2022-10-17 ENCOUNTER — CLINICAL SUPPORT (OUTPATIENT)
Dept: REHABILITATION | Facility: HOSPITAL | Age: 47
End: 2022-10-17
Payer: COMMERCIAL

## 2022-10-17 DIAGNOSIS — M54.16 LUMBAR RADICULOPATHY: ICD-10-CM

## 2022-10-17 DIAGNOSIS — M53.86 DECREASED RANGE OF MOTION OF LUMBAR SPINE: ICD-10-CM

## 2022-10-17 DIAGNOSIS — M62.81 MUSCLE WEAKNESS: ICD-10-CM

## 2022-10-17 PROCEDURE — 97161 PT EVAL LOW COMPLEX 20 MIN: CPT | Mod: PN

## 2022-10-18 PROBLEM — M53.86 DECREASED RANGE OF MOTION OF LUMBAR SPINE: Status: ACTIVE | Noted: 2022-10-18

## 2022-10-18 PROBLEM — M62.81 MUSCLE WEAKNESS: Status: ACTIVE | Noted: 2022-10-18

## 2022-10-18 NOTE — PLAN OF CARE
"[facility]   Physical Therapy Initial Evaluation     Date: 10/17/2022   [facility]  [facility] Number: [id number]    Therapy Diagnosis:   Encounter Diagnoses   Name Primary?    Lumbar radiculopathy     Decreased range of motion of lumbar spine     Muscle weakness      Physician: Roxane Corbett*    Physician Orders: PT Eval and Treat   Medical Diagnosis from Referral: M54.16 (ICD-10-CM) - Lumbar radiculopathy  Evaluation Date: 10/17/2022  Authorization Period Expiration: 10/06/2023  Plan of Care Expiration: 11/28/2022  Progress Note Due: 11/16/2022  Visit # / Visits authorized: 1/ 1   FOTO: 1/4    Precautions: Standard     Time In: 8:15 am  Time Out: 8:50 am  Total Appointment Time (timed & untimed codes): 45 minutes      SUBJECTIVE     Date of onset: Chronic back pain    History of current condition - Daniel reports: History of chronic low back pain, recent flare up of numbness/tingling in right leg that began earlier this year, travels all the way down to the foot. Occasionally has low back pain when he sits for too long, describes it as a stiffness. Standing tends to make symptoms worse, but prolonged sitting can also bring numbness/tingling on. Worst instance of numbness/tingling was after a long car ride, and his leg felt weak after getting out of the car.     Falls: none    Imaging, X-ray: "FINDINGS:  DJD with narrowing of all of the intervertebral disc spaces.  There is a few mm L4/L5 retrolisthesis.  No fracture or dislocation.  No bone destruction identified."  MRI: "Impression:   1. Lumbar degenerative changes most pronounced at L3-L4 noting moderate spinal canal and lateral recess stenosis and severe left mild right neural foraminal narrowing with suspected impingement of the left exiting L3 nerve root."    Prior Therapy: PT for knees   Social History:  lives with their family  Occupation: Supervisor, sits at desk   Prior Level of Function: independent   Current " Level of Function: independent but discomfort     Pain:  Current 5/10, worst 9/10, best 2/10   Location: right leg  Description: Tingling and Numb  Aggravating Factors: Sitting, Standing, and Walking  Easing Factors: stretch    Patients goals: get numbness/tingling to go away      Medical History:   Past Medical History:   Diagnosis Date    Cervical radiculopathy 10/29/2019    Cervical strain 4/20/2021    History of repair of ACL - 1994, meniscus 2007 6/24/2019 1994, meniscus 2007    Intractable episodic headache 4/22/2019    Sciatica of right side 4/23/2019       Surgical History:   Daniel Garcias  has a past surgical history that includes Knee arthroscopy w/ meniscal repair (1998); Knee arthroscopy w/ ACL reconstruction; and Colonoscopy (N/A, 4/11/2018).    Medications:   Daniel has a current medication list which includes the following prescription(s): atorvastatin, methylprednisolone, pregabalin, and sildenafil.    Allergies:   Review of patient's allergies indicates:   Allergen Reactions    Shellfish containing products Hives          OBJECTIVE     Posture:  decreased lumbar lordosis     Lumbar Range of Motion:    Degrees Pain   Flexion 75           Extension 25           Left Side Bending 25         Right Side Bending 25         Left rotation   60         Right Rotation   60              Lower Extremity Strength  Right LE  Left LE    Knee extension: 5/5 Knee extension: 5/5   Knee flexion: 5/5 Knee flexion: 4+/5   Hip flexion: 5/5 Hip flexion: 4+/5   Hip extension:  4-/5 Hip extension: 4-/5   Hip abduction: 4+/5 Hip abduction: 4+/5   Hip adduction: 4+/5 Hip adduction 4+/5   Ankle dorsiflexion: 5/5 Ankle dorsiflexion: 5/5   Ankle plantarflexion: 5/5 Ankle plantarflexion: 5/5     Lower abdominals: 4-/5     Special Tests:  -Repeated Flexion: no change  -Repeated Ext: right low back discomfort   -Piriformis Test: NT  -Prone Instability Test: NT  -Bridge Test: Negative   -OH Squat: NT      Neuro Dynamic  Testing:    Sciatic nerve:      SLR: R = positive     L = positive         Slump negative bilaterally       Joint Mobility: Hypomobility of lumbar spine     Palpation: no TTP of lumbar spine    Sensation: within normal limits     Flexibility: 20 degree limitation of right hamstrings       Limitation/Restriction for FOTO Survey    Therapist reviewed FOTO scores for Daniel Garcias on 10/17/2022.   FOTO documents entered into [employer] - see Media section.    Limitation Score: 17%  Predicted Score: 14%         TREATMENT     Total Treatment time (time-based codes) separate from Evaluation: 00 minutes      Daniel received the treatments listed below:      therapeutic exercises to develop strength, ROM, flexibility, posture, and core stabilization for 00 minutes including:  Transverse abdominus activation   Bridge  Lower trunk rotation   Standing lumbar extension  Prone on elbows     PATIENT EDUCATION AND HOME EXERCISES     Education provided:   - Prognosis and plan of care  - Performance of HEP and importance of compliance  - Scheduling and attendance policy     Written Home Exercises Provided: yes. Exercises were reviewed and Daniel was able to demonstrate them prior to the end of the session.  Daniel demonstrated good  understanding of the education provided. See EMR under Patient Instructions for exercises provided during therapy sessions.    ASSESSMENT     Daniel is a 47 y.o. male referred to outpatient Physical Therapy with a medical diagnosis of M54.16 (ICD-10-CM) - Lumbar radiculopathy. Patient presents with limitations in lumbar range of motion and segmental mobility, weakness of glute and lower abdominal musculature, and exhibits positive straight leg raise test bilaterally. Also demonstrates deficits in right hamstring extensibility, likely secondary to neural tension. Classification of chronic low back pain with radiating pain. Unable to definitively determine flexion vs extension preference today, may require  further assessment at follow up visit.     Patient prognosis is Good.   Patient will benefit from skilled outpatient Physical Therapy to address the deficits stated above and in the chart below, provide patient /family education, and to maximize patientt's level of independence.     Plan of care discussed with patient: Yes  Patient's spiritual, cultural and educational needs considered and patient is agreeable to the plan of care and goals as stated below:     Anticipated Barriers for therapy: chronicity of condition    Medical Necessity is demonstrated by the following  History  Co-morbidities and personal factors that may impact the plan of care Co-morbidities:   Cervical radiculopathy 10/29/2019   Cervical strain 4/20/2021   History of repair of ACL - 1994, meniscus 2007 6/24/2019   1994, meniscus 2007   Intractable episodic headache 4/22/2019   Sciatica of right side 4/23/2019       Personal Factors:   lifestyle     low   Examination  Body Structures and Functions, activity limitations and participation restrictions that may impact the plan of care Body Regions:   back  lower extremities    Body Systems:    gross symmetry  ROM  strength  gross coordinated movement  gait  transfers  transitions  motor control    Participation Restrictions:   none    Activity limitations:   Learning and applying knowledge  no deficits    General Tasks and Commands  no deficits    Communication  no deficits    Mobility  walking    Self care  no deficits    Domestic Life  shopping  cooking  doing house work (cleaning house, washing dishes, laundry)    Interactions/Relationships  no deficits    Life Areas  no deficits    Community and Social Life  no deficits         low   Clinical Presentation stable and uncomplicated low   Decision Making/ Complexity Score: low     Goals:  Short Term Goals: 3 weeks  1.Report decreased low back pain  < / =  5 /10 at worst to increase tolerance for activities of daily living.   2. Increase lumbar  range of motion by > / = 10% in order to improve functional mobility.    3. Increase strength by 1/3 MMT grade in bilateral lower extremities in order to improve tolerance for activities of daily living.  4. Pt to tolerate home exercise program to improve range of motion, strength, and independence with activities of daily living.      Long Term Goals: 6 weeks  1.Report decreased low back pain < / = 2 /10 at worst to increase tolerance for activities of daily living.   2.Increase strength to >/= 4/5 MMT grade for lower abdominals to increase tolerance for activities of daily living and work activities.  3. Pt will report FOTO limitation score of < / = 14% in order to demonstrate improvement in low back pain and decreased disability.     PLAN   Plan of care Certification: 10/17/2022 to 11/28/2022.    Outpatient Physical Therapy 2 times weekly for 6 weeks to include the following interventions: Cervical/Lumbar Traction, Gait Training, Manual Therapy, Moist Heat/ Ice, Neuromuscular Re-ed, Patient Education, Therapeutic Activities, and Therapeutic Exercise.     Moises Tatum, PT      I CERTIFY THE NEED FOR THESE SERVICES FURNISHED UNDER THIS PLAN OF TREATMENT AND WHILE UNDER MY CARE   Physician's comments:     Physician's Signature: ___________________________________________________

## 2022-10-21 ENCOUNTER — HOSPITAL ENCOUNTER (OUTPATIENT)
Facility: OTHER | Age: 47
Discharge: HOME | End: 2022-10-21
Attending: ANESTHESIOLOGY | Admitting: ANESTHESIOLOGY
Payer: COMMERCIAL

## 2022-10-21 VITALS
BODY MASS INDEX: 33.49 KG/M2 | TEMPERATURE: 98 F | WEIGHT: 275 LBS | HEIGHT: 76 IN | RESPIRATION RATE: 16 BRPM | HEART RATE: 62 BPM | DIASTOLIC BLOOD PRESSURE: 80 MMHG | SYSTOLIC BLOOD PRESSURE: 154 MMHG | OXYGEN SATURATION: 96 %

## 2022-10-21 DIAGNOSIS — M51.36 DDD (DEGENERATIVE DISC DISEASE), LUMBAR: Primary | ICD-10-CM

## 2022-10-21 DIAGNOSIS — M54.17 LUMBOSACRAL RADICULOPATHY: ICD-10-CM

## 2022-10-21 DIAGNOSIS — G89.29 CHRONIC PAIN: ICD-10-CM

## 2022-10-21 PROCEDURE — 64483 NJX AA&/STRD TFRM EPI L/S 1: CPT | Mod: RT,,, | Performed by: ANESTHESIOLOGY

## 2022-10-21 PROCEDURE — 25000003 PHARM REV CODE 250: Performed by: STUDENT IN AN ORGANIZED HEALTH CARE EDUCATION/TRAINING PROGRAM

## 2022-10-21 PROCEDURE — 64483 NJX AA&/STRD TFRM EPI L/S 1: CPT | Mod: RT | Performed by: ANESTHESIOLOGY

## 2022-10-21 PROCEDURE — 25500020 PHARM REV CODE 255: Performed by: ANESTHESIOLOGY

## 2022-10-21 PROCEDURE — 64483 PR EPIDURAL INJ, ANES/STEROID, TRANSFORAMINAL, LUMB/SACR, SNGL LEVL: ICD-10-PCS | Mod: RT,,, | Performed by: ANESTHESIOLOGY

## 2022-10-21 PROCEDURE — 63600175 PHARM REV CODE 636 W HCPCS: Performed by: ANESTHESIOLOGY

## 2022-10-21 PROCEDURE — 25000003 PHARM REV CODE 250: Performed by: ANESTHESIOLOGY

## 2022-10-21 RX ORDER — FENTANYL CITRATE 50 UG/ML
INJECTION, SOLUTION INTRAMUSCULAR; INTRAVENOUS
Status: DISCONTINUED | OUTPATIENT
Start: 2022-10-21 | End: 2022-10-21 | Stop reason: HOSPADM

## 2022-10-21 RX ORDER — DEXAMETHASONE SODIUM PHOSPHATE 10 MG/ML
INJECTION INTRAMUSCULAR; INTRAVENOUS
Status: DISCONTINUED | OUTPATIENT
Start: 2022-10-21 | End: 2022-10-21 | Stop reason: HOSPADM

## 2022-10-21 RX ORDER — MIDAZOLAM HYDROCHLORIDE 1 MG/ML
INJECTION INTRAMUSCULAR; INTRAVENOUS
Status: DISCONTINUED | OUTPATIENT
Start: 2022-10-21 | End: 2022-10-21 | Stop reason: HOSPADM

## 2022-10-21 RX ORDER — SODIUM CHLORIDE 9 MG/ML
500 INJECTION, SOLUTION INTRAVENOUS CONTINUOUS
Status: DISCONTINUED | OUTPATIENT
Start: 2022-10-21 | End: 2022-10-21 | Stop reason: HOSPADM

## 2022-10-21 RX ORDER — LIDOCAINE HYDROCHLORIDE 10 MG/ML
INJECTION, SOLUTION EPIDURAL; INFILTRATION; INTRACAUDAL; PERINEURAL
Status: DISCONTINUED | OUTPATIENT
Start: 2022-10-21 | End: 2022-10-21 | Stop reason: HOSPADM

## 2022-10-21 RX ORDER — LIDOCAINE HYDROCHLORIDE 20 MG/ML
INJECTION, SOLUTION INFILTRATION; PERINEURAL
Status: DISCONTINUED | OUTPATIENT
Start: 2022-10-21 | End: 2022-10-21 | Stop reason: HOSPADM

## 2022-10-21 NOTE — DISCHARGE SUMMARY
Discharge Note  Short Stay      SUMMARY     Admit Date: 10/21/2022    Attending Physician: Simone Prather      Discharge Physician: Simone Prather      Discharge Date: 10/21/2022 11:06 AM    Procedure(s) (LRB):  LUMBAR TRANSFORAMINAL RIGHT L5/S1 CONTRAST DIRECT REFERRAL (Right)    Final Diagnosis: Lumbar radiculopathy [M54.16]    Disposition: Home or self care    Patient Instructions:   Current Discharge Medication List        CONTINUE these medications which have NOT CHANGED    Details   atorvastatin (LIPITOR) 10 MG tablet Take 1 tablet (10 mg total) by mouth once daily.  Qty: 90 tablet, Refills: 3    Comments: Please delete all prior scripts with same name and strength including on holds.  Associated Diagnoses: Hyperlipidemia, unspecified hyperlipidemia type      methylPREDNISolone (MEDROL DOSEPACK) 4 mg tablet use as directed  Qty: 21 each, Refills: 0      pregabalin (LYRICA) 75 MG capsule Take 1 capsule (75 mg total) by mouth 2 (two) times daily.  Qty: 60 capsule, Refills: 1      sildenafiL (VIAGRA) 100 MG tablet Take 1 tablet (100 mg total) by mouth daily as needed for Erectile Dysfunction.  Qty: 30 tablet, Refills: 2    Associated Diagnoses: Erectile dysfunction, unspecified erectile dysfunction type                 Discharge Diagnosis: Lumbar radiculopathy [M54.16]  Condition on Discharge: Stable with no complications to procedure   Diet on Discharge: Same as before.  Activity: as per instruction sheet.  Discharge to: Home with a responsible adult.  Follow up: 2-4 weeks

## 2022-10-21 NOTE — OP NOTE
Lumbar Transforaminal Epidural Steroid Injection under Fluoroscopic Guidance    The procedure, risks, benefits, and options were discussed with the patient. There are no contraindications to the procedure. The patent expressed understanding and agreed to the procedure. Informed written consent was obtained prior to the start of the procedure and can be found in the patient's chart.    PATIENT NAME: Daniel Garcias   MRN: [id number]     DATE OF PROCEDURE: 10/21/2022    PROCEDURE:  Right  L5/S1 Lumbar Transforaminal Epidural Steroid Injection under Fluoroscopic Guidance    PRE-OP DIAGNOSIS: Lumbar radiculopathy [M54.16] Lumbar radiculopathy [M54.16]    POST-OP DIAGNOSIS: Same    PHYSICIAN: Simone Prather MD    ASSISTANTS: Dr. Phillips     MEDICATIONS INJECTED: Preservative-free Decadron 10mg with 2cc of Lidocaine 1% MPF     LOCAL ANESTHETIC INJECTED: Xylocaine 2%     SEDATION: Versed 2mg and Fentanyl 100mcg                                                                                                                                                                                     Conscious sedation ordered by M.MARQUITA. Patient re-evaluation prior to administration of conscious sedation. No changes noted in patient's status from initial evaluation. The patient's vital signs were monitored by RN and patient remained hemodynamically stable throughout the procedure.    Event Time In   Sedation Start 1057   Sedation End 1103       ESTIMATED BLOOD LOSS: None    COMPLICATIONS: None    TECHNIQUE: Time-out was performed to identify the patient and procedure to be performed. With the patient laying in a prone position, the surgical area was prepped and draped in the usual sterile fashion using ChloraPrep and a fenestrated drape.The levels were determined under fluoroscopy guidance. Skin anesthesia was achieved by injecting Lidocaine 2% over the injection sites. The transforaminal spaces were then approached with a 22 gauge, 5 inch  spinal quinke needle that was introduced under fluoroscopic guidance in the AP and Lateral views. Once the needle tip was in the area of the transforaminal space, and there was no blood, CSF or paraesthesias, contrast dye Omnipaque (240mg/mL) was injected to confirm placement and there was no vascular runoff. Fluoroscopic imaging in the AP and lateral views revealed a clear outline of the spinal nerve with proximal spread of agent through the neural foramen into the epidural space. 3 mL of the medication mixture listed above was injected slowly at each site. Displacement of the radio opaque contrast after injection of the medication confirmed that the medication went into the area of the transforaminal spaces. The needles were removed and bleeding was nil. A sterile dressing was applied. No specimens collected. The patient tolerated the procedure well.       The patient was monitored after the procedure in the recovery area. They were given post-procedure and discharge instructions to follow at home. The patient was discharged in a stable condition.    I reviewed and edited the fellow's note. I conducted my own interview and physical examination. I agree with the findings. I was present and supervising all critical portions of the procedure.    Simone Prather MD

## 2022-10-21 NOTE — DISCHARGE INSTRUCTIONS
Thank you for allowing us to care for you today. You may receive a survey about the care we provided. Your feedback is valuable and helps us provide excellent care throughout the community.     Home Care Instructions for Pain Management:    1. DIET:   You may resume your normal diet today.   2. BATHING:   You may shower with luke warm water. No tub baths or anything that will soak injection sites under water for the next 24 hours.  3. DRESSING:   You may remove your bandage today.   4. ACTIVITY LEVEL:   You may resume your normal activities 24 hrs after your procedure. Nothing strenuous today.  5. MEDICATIONS:   You may resume your normal medications today. To restart blood thinners, ask your doctor.  6. DRIVING    If you have received any sedatives by mouth today, you may not drive for 12 hours.    If you have received any sedation through your IV, you may not drive for 24 hrs.   7. SPECIAL INSTRUCTIONS:   No heat to the injection site for 24 hrs including, hot bath or shower, heating pad, moist heat, or hot tubs.    Use ice pack to injection site for any pain or discomfort.  Apply ice packs for 20 minute intervals as needed.    IF you have diabetes, be sure to monitor your blood sugar more closely. IF your injection contained steroids your blood sugar levels may become higher than normal.    If you are still having pain upon discharge:  Your pain may improve over the next 48 hours. The anesthetic (numbing medication) works immediately to 48 hours. IF your injection contained a steroid (anti-inflammatory medication), it takes approximately 3 days to start feeling relief and 7-10 days to see your greatest results from the medication. It is possible you may need subsequent injections. This would be discussed at your follow up appointment with pain management or your referring doctor.    Please call the PAIN MANAGEMENT office at [phone] or ON CALL pager at [phone] if you experienced any:   -Weakness or  loss of sensation  -Fever > 101.5  -Pain uncontrolled with oral medications   -Persistent nausea, vomiting, or diarrhea  -Redness or drainage from the injection sites, or any other worrisome concerns.   If physician on call was not reached or could not communicate with our office for any reason please go to the nearest emergency department. Adult Procedural Sedation Instructions    Recovery After Procedural Sedation (Adult)  You have been given medicine by vein to make you sleep during your surgery. This may have included both a pain medicine and sleeping medicine. Most of the effects have worn off. But you may still have some drowsiness for the next 6 to 8 hours.  Home care  Follow these guidelines when you get home:  For the next 8 hours, you should be watched by a responsible adult. This person should make sure your condition is not getting worse.  Don't drink any alcohol for the next 24 hours.  Don't drive, operate dangerous machinery, or make important business or personal decisions during the next 24 hours.  Note: Your healthcare provider may tell you not to take any medicine by mouth for pain or sleep in the next 4 hours. These medicines may react with the medicines you were given in the hospital. This could cause a much stronger response than usual.  Follow-up care  Follow up with your healthcare provider if you are not alert and back to your usual level of activity within 12 hours.  When to seek medical advice  Call your healthcare provider right away if any of these occur:  Drowsiness gets worse  Weakness or dizziness gets worse  Repeated vomiting  You can't be awakened   Date Last Reviewed: 10/18/2016  © 4565-0474 [employer] "[employer] [address] [place] [address], [place] [address]. All rights reserved. This information is not intended as a substitute for professional medical care. Always follow your healthcare professional's instructions.

## 2022-10-21 NOTE — H&P
HPI  Patient presenting for Procedure(s) (LRB):  LUMBAR TRANSFORAMINAL RIGHT L5/S1 CONTRAST DIRECT REFERRAL (Right)     Patient on Anti-coagulation No    No health changes since previous encounter    Past Medical History:   Diagnosis Date    Cervical radiculopathy 10/29/2019    Cervical strain 4/20/2021    History of repair of ACL - 1994, meniscus 2007 6/24/2019    1994, meniscus 2007    Intractable episodic headache 4/22/2019    Sciatica of right side 4/23/2019     Past Surgical History:   Procedure Laterality Date    COLONOSCOPY N/A 4/11/2018    Procedure: COLONOSCOPY golytely;  Surgeon: Deborah Gamino MD;  Location: [facility];  Service: Endoscopy;  Laterality: N/A;    KNEE ARTHROSCOPY W/ ACL RECONSTRUCTION      left    KNEE ARTHROSCOPY W/ MENISCAL REPAIR  1998    left     Review of patient's allergies indicates:   Allergen Reactions    Shellfish containing products Hives      No current facility-administered medications for this encounter.       PMHx, PSHx, Allergies, Medications reviewed in epic    ROS negative except pain complaints in HPI    OBJECTIVE:    There were no vitals taken for this visit.    PHYSICAL EXAMINATION:    GENERAL: Well appearing, in no acute distress, alert and oriented x3.  PSYCH:  Mood and affect appropriate.  SKIN: Skin color, texture, turgor normal, no rashes or lesions which will impact the procedure.  CV: RRR with palpation of the radial artery.  PULM: No evidence of respiratory difficulty, symmetric chest rise. Clear to auscultation.  NEURO: Cranial nerves grossly intact.    Plan:    Proceed with procedure as planned Procedure(s) (LRB):  LUMBAR TRANSFORAMINAL RIGHT L5/S1 CONTRAST DIRECT REFERRAL (Right)    Noah Cervantes  10/21/2022

## 2022-10-24 ENCOUNTER — PATIENT MESSAGE (OUTPATIENT)
Dept: PAIN MEDICINE | Facility: OTHER | Age: 47
End: 2022-10-24
Payer: COMMERCIAL

## 2022-10-31 ENCOUNTER — PATIENT MESSAGE (OUTPATIENT)
Dept: NEUROSURGERY | Facility: CLINIC | Age: 47
End: 2022-10-31
Payer: COMMERCIAL

## 2022-12-02 DIAGNOSIS — E78.5 HYPERLIPIDEMIA, UNSPECIFIED HYPERLIPIDEMIA TYPE: ICD-10-CM

## 2022-12-02 RX ORDER — ATORVASTATIN CALCIUM 10 MG/1
TABLET, FILM COATED ORAL
Qty: 90 TABLET | Refills: 2 | Status: SHIPPED | OUTPATIENT
Start: 2022-12-02 | End: 2023-09-04

## 2022-12-02 NOTE — TELEPHONE ENCOUNTER
No new care gaps identified.  [facility] Embedded Care Gaps. Reference number: [id number]. 12/02/2022   9:36:02 AM CST

## 2022-12-02 NOTE — TELEPHONE ENCOUNTER
Refill Decision Note   Daniel Garcias  is requesting a refill authorization.  Brief Assessment and Rationale for Refill:  Approve     Medication Therapy Plan:       Medication Reconciliation Completed: No   Comments:     No Care Gaps recommended.     Note composed:5:12 PM 12/02/2022

## 2023-01-12 ENCOUNTER — OFFICE VISIT (OUTPATIENT)
Dept: NEUROSURGERY | Facility: CLINIC | Age: 48
End: 2023-01-12
Payer: COMMERCIAL

## 2023-01-12 ENCOUNTER — TELEPHONE (OUTPATIENT)
Dept: NEUROSURGERY | Facility: CLINIC | Age: 48
End: 2023-01-12
Payer: COMMERCIAL

## 2023-01-12 VITALS
DIASTOLIC BLOOD PRESSURE: 95 MMHG | SYSTOLIC BLOOD PRESSURE: 150 MMHG | HEIGHT: 76 IN | BODY MASS INDEX: 33.49 KG/M2 | WEIGHT: 275 LBS | HEART RATE: 64 BPM

## 2023-01-12 DIAGNOSIS — M54.17 LUMBOSACRAL RADICULOPATHY: Primary | ICD-10-CM

## 2023-01-12 PROCEDURE — 99213 PR OFFICE/OUTPT VISIT, EST, LEVL III, 20-29 MIN: ICD-10-PCS | Mod: S$GLB,,, | Performed by: NEUROLOGICAL SURGERY

## 2023-01-12 PROCEDURE — 99999 PR PBB SHADOW E&M-EST. PATIENT-LVL III: CPT | Mod: PBBFAC,,, | Performed by: NEUROLOGICAL SURGERY

## 2023-01-12 PROCEDURE — 99999 PR PBB SHADOW E&M-EST. PATIENT-LVL III: ICD-10-PCS | Mod: PBBFAC,,, | Performed by: NEUROLOGICAL SURGERY

## 2023-01-12 PROCEDURE — 99213 OFFICE O/P EST LOW 20 MIN: CPT | Mod: S$GLB,,, | Performed by: NEUROLOGICAL SURGERY

## 2023-01-12 NOTE — PROGRESS NOTES
Neurosurgery  Established Patient    SUBJECTIVE:     History of Present Illness:  Daniel Garcias is 47 y.o. male without significant PMHx who referred to clinic by  with RLE radicular pain. Reports right leg numbness on his thigh with radiation to his toe occasionally. He has been having right lower extremity L5 and S1 for the foot pain for the past 4 months.  The pain is progressive and every day.  Pain on average 5/10.  It gets 8 when severe and 3 when it is mild. Pain increased with standing and sitting for prolonged period of time.  Current episode is persistent and impacting mobility. No specific aggravating factors. He denies any leg weakness.  He denies any bowel bladder dysfunction. He denies any significant back pain.  He has not tried any conservative management at this stage.    Interval History 1/12/23 :  The patient is being seen in clinic today to follow-up on his symptoms since obtaining additional conservative treatment. He has tried medications :NSAIDs, acetaminophen, diclofenac gel, and steroids without relief. He recently underwent an SEAN on 10/21/22 without any relief. He continues to obtain PT and home exercises and has not noticed any improvement.  He has also noticed that his leg felt weak after getting out of the car. He has tried numerous conservative measures to treat his pain without relief.   Review of patient's allergies indicates:   Allergen Reactions    Shellfish containing products Hives       Current Outpatient Medications   Medication Sig Dispense Refill    atorvastatin (LIPITOR) 10 MG tablet TAKE 1 TABLET(10 MG) BY MOUTH EVERY DAY 90 tablet 2    sildenafiL (VIAGRA) 100 MG tablet Take 1 tablet (100 mg total) by mouth daily as needed for Erectile Dysfunction. 30 tablet 2    pregabalin (LYRICA) 75 MG capsule Take 1 capsule (75 mg total) by mouth 2 (two) times daily. 60 capsule 1     No current facility-administered medications for this visit.       Past Medical History:    Diagnosis Date    Cervical radiculopathy 10/29/2019    Cervical strain 4/20/2021    History of repair of ACL - 1994, meniscus 2007 6/24/2019    1994, meniscus 2007    Intractable episodic headache 4/22/2019    Sciatica of right side 4/23/2019     Past Surgical History:   Procedure Laterality Date    COLONOSCOPY N/A 4/11/2018    Procedure: COLONOSCOPY golytely;  Surgeon: Deborah Gamino MD;  Location: [facility] ENDO;  Service: Endoscopy;  Laterality: N/A;    KNEE ARTHROSCOPY W/ ACL RECONSTRUCTION      left    KNEE ARTHROSCOPY W/ MENISCAL REPAIR  1998    left    TRANSFORAMINAL EPIDURAL INJECTION OF STEROID Right 10/21/2022    Procedure: LUMBAR TRANSFORAMINAL RIGHT L5/S1 CONTRAST DIRECT REFERRAL;  Surgeon: Simone Prather MD;  Location: [facility];  Service: Pain Management;  Laterality: Right;     Family History       Problem Relation (Age of Onset)    Cancer Maternal Grandmother    Hyperlipidemia Father    Hypertension Mother          Social History     Socioeconomic History    Marital status:     Number of children: 3   Occupational History    Occupation: Operations   Tobacco Use    Smoking status: Never    Smokeless tobacco: Never   Substance and Sexual Activity    Alcohol use: No    Drug use: No    Sexual activity: Yes     Partners: Female       Review of Systems   Constitutional:  Negative for fever.   HENT:  Negative for hearing loss.    Eyes:  Negative for blurred vision.   Cardiovascular:  Negative for chest pain.   Gastrointestinal:  Negative for heartburn.   Genitourinary:  Negative for dysuria.   Musculoskeletal:  Positive for back pain.   Skin:  Negative for rash.   Neurological:  Positive for focal weakness.   Hematological:  Does not bruise/bleed easily.     Review of Systems   Constitutional:  Negative for fever.   HENT:  Negative for hearing loss.    Eyes:  Negative for blurred vision.   Cardiovascular:  Negative for chest pain.   Gastrointestinal:  Negative for heartburn.  "  Genitourinary:  Negative for dysuria.   Musculoskeletal:  Positive for back pain.   Skin:  Negative for rash.   Neurological:  Positive for focal weakness.   Endo/Heme/Allergies:  Does not bruise/bleed easily.       OBJECTIVE:     Vital Signs  Pulse: 64  BP: (!) 150/95  Pain Score: 0-No pain  Height: 6' 4" (193 cm)  Weight: 124.7 kg (275 lb)  Body mass index is 33.47 kg/m².    Neurosurgery Physical Exam  General: well developed, well nourished, no distress.   Head: normocephalic, atraumatic  Neurologic: Alert and oriented. Thought content appropriate.  GCS: Motor: 6/Verbal: 5/Eyes: 4 GCS Total: 15  Mental Status: Awake, Alert, Oriented x 4  Language: No aphasia  Speech: No dysarthria  Cranial nerves: face symmetric, tongue midline, CN II-XII grossly intact.   Eyes: pupils equal, round, reactive to light with accomodation, EOMI.   Pulmonary: normal respirations, no signs of respiratory distress  Abdomen: soft, non-distended  Skin: Skin is warm, dry and intact.  Sensory: intact to light touch throughout  Motor Strength:Moves all extremities spontaneously with good tone.    Strength   Deltoids Triceps Biceps Wrist Extension Wrist Flexion Hand    Upper: R 5/5 5/5 5/5 5/5 5/5 5/5     L 5/5 5/5 5/5 5/5 5/5 5/5       HF KE KF DF PF EHL   Lower: R 5/5 5/5 5/5 5/5 5/5 3/5     L 4/5 5/5 5/5 5/5 5/5 5/5      Cervical:   ROM: Pain limited with flexion, extension, lateral rotation and ear-to-shoulder bend.   Midline TTP: Negative.     Thoracic:  Midline TTP: Negative.     Lumbar:  Midline TTP: Positive  Straight Leg Test: Positive R>L     Diagnostic Results:  1.L spine MRI 08/10/22:  Shows multilevel lumbar degenerative disc herniation L2  down to S1 with foraminal, lateral recess, and foraminal stenosis worst at L5-S1 with right-sided lateral recess stenosis causing S1 nerve root compression as well as foraminal stenosis causing L5 nerve root compression.      2.Scoliosis films  8/15/22:   Good sagittal and coronal " alignment.  Lumbar lordosis of 40°.  Pelvic incidence of 60°.  20° of spinal pelvic mismatch.      3.L spine flex/ex 8/15/22  Without significant dynamic instability    ASSESSMENT/PLAN:      46 y/o male with months of right L5-S1 radiculopathy refractory to multimodal conservative therapy. I have seen him in clinic multiple times now for same problem which is getting worse.  I recommend a right L5-S1 laminectomy/discectomy. R/B/A/I/M were reviewed in detail and he wished to proceed.         Note dictated with voice recognition software, please excuse any grammatical errors.

## 2023-01-12 NOTE — TELEPHONE ENCOUNTER
Dr. Gordon discussed surgery for MIS microdiskectomy. Patient agreed for surgery date of 2/27/23- offered earlier date but patient wanted this date.  Patient signed consents. Written preop instructions given to patient.    Marbella Swartz, RN, CCM, CMSRN  RN Navigator  [facility]

## 2023-01-13 ENCOUNTER — TELEPHONE (OUTPATIENT)
Dept: NEUROSURGERY | Facility: CLINIC | Age: 48
End: 2023-01-13
Payer: COMMERCIAL

## 2023-01-13 ENCOUNTER — TELEPHONE (OUTPATIENT)
Dept: INTERNAL MEDICINE | Facility: CLINIC | Age: 48
End: 2023-01-13
Payer: COMMERCIAL

## 2023-01-13 DIAGNOSIS — M51.26 HERNIATED LUMBAR DISC WITHOUT MYELOPATHY: Primary | ICD-10-CM

## 2023-01-13 NOTE — TELEPHONE ENCOUNTER
----- Message from Lawrence Dsouza sent at 1/13/2023  3:56 PM CST -----  Contact: [phone]  Pt needs a call back about a clearance for surgery he needs. Please call pt back.

## 2023-01-14 NOTE — TELEPHONE ENCOUNTER
Called pt to schedule appointment for clearance for sx, no answer. Left message for pt to return call.

## 2023-01-24 ENCOUNTER — TELEPHONE (OUTPATIENT)
Dept: INTERNAL MEDICINE | Facility: CLINIC | Age: 48
End: 2023-01-24
Payer: COMMERCIAL

## 2023-01-24 ENCOUNTER — LAB VISIT (OUTPATIENT)
Dept: LAB | Facility: HOSPITAL | Age: 48
End: 2023-01-24
Payer: COMMERCIAL

## 2023-01-24 ENCOUNTER — OFFICE VISIT (OUTPATIENT)
Dept: INTERNAL MEDICINE | Facility: CLINIC | Age: 48
End: 2023-01-24
Attending: FAMILY MEDICINE
Payer: COMMERCIAL

## 2023-01-24 VITALS
WEIGHT: 284 LBS | DIASTOLIC BLOOD PRESSURE: 86 MMHG | HEIGHT: 76 IN | SYSTOLIC BLOOD PRESSURE: 138 MMHG | BODY MASS INDEX: 34.58 KG/M2

## 2023-01-24 DIAGNOSIS — Z01.818 PREOPERATIVE CLEARANCE: Primary | ICD-10-CM

## 2023-01-24 DIAGNOSIS — Z01.818 PREOPERATIVE CLEARANCE: ICD-10-CM

## 2023-01-24 DIAGNOSIS — E78.2 MIXED HYPERLIPIDEMIA: ICD-10-CM

## 2023-01-24 DIAGNOSIS — Z12.5 PROSTATE CANCER SCREENING: ICD-10-CM

## 2023-01-24 DIAGNOSIS — R20.0 RIGHT LEG NUMBNESS: ICD-10-CM

## 2023-01-24 DIAGNOSIS — Z01.818 PREOPERATIVE TESTING: Primary | ICD-10-CM

## 2023-01-24 DIAGNOSIS — M47.816 LUMBAR SPONDYLOSIS: ICD-10-CM

## 2023-01-24 LAB
BILIRUB UR QL STRIP: NEGATIVE
CLARITY UR REFRACT.AUTO: CLEAR
COLOR UR AUTO: YELLOW
GLUCOSE UR QL STRIP: NEGATIVE
HGB UR QL STRIP: NEGATIVE
KETONES UR QL STRIP: NEGATIVE
LEUKOCYTE ESTERASE UR QL STRIP: NEGATIVE
MICROSCOPIC COMMENT: NORMAL
NITRITE UR QL STRIP: NEGATIVE
PH UR STRIP: 6 [PH] (ref 5–8)
PROT UR QL STRIP: NEGATIVE
RBC #/AREA URNS AUTO: 0 /HPF (ref 0–4)
SP GR UR STRIP: 1.02 (ref 1–1.03)
URN SPEC COLLECT METH UR: NORMAL
WBC #/AREA URNS AUTO: 0 /HPF (ref 0–5)

## 2023-01-24 PROCEDURE — 87086 URINE CULTURE/COLONY COUNT: CPT | Performed by: FAMILY MEDICINE

## 2023-01-24 PROCEDURE — 99214 OFFICE O/P EST MOD 30 MIN: CPT | Mod: S$GLB,,, | Performed by: FAMILY MEDICINE

## 2023-01-24 PROCEDURE — 99214 PR OFFICE/OUTPT VISIT, EST, LEVL IV, 30-39 MIN: ICD-10-PCS | Mod: S$GLB,,, | Performed by: FAMILY MEDICINE

## 2023-01-24 PROCEDURE — 99999 PR PBB SHADOW E&M-EST. PATIENT-LVL III: CPT | Mod: PBBFAC,,, | Performed by: FAMILY MEDICINE

## 2023-01-24 PROCEDURE — 81001 URINALYSIS AUTO W/SCOPE: CPT | Performed by: FAMILY MEDICINE

## 2023-01-24 PROCEDURE — 99999 PR PBB SHADOW E&M-EST. PATIENT-LVL III: ICD-10-PCS | Mod: PBBFAC,,, | Performed by: FAMILY MEDICINE

## 2023-01-24 NOTE — Clinical Note
3/10/2023 addendum note:  Patient's surgery was delayed due to insurance coverage issue.  Preop center asking for another clearance, he was just seen in January.  Called patient today, no significant interval history.  Nothing has changed in regards to his health status.  Will update clearance with today's date. Clear for surgery and cc to referring physician.

## 2023-01-24 NOTE — TELEPHONE ENCOUNTER
----- Message from Julia Mendoza RN sent at 1/24/2023  1:39 PM CST -----  Patient is scheduled for laminectomy with discectomy of L5-S1 on 2/27 with Dr. Gordon.( Approximately 180 minutes of general anesthesia) He will need medical clearance and is scheduled to see you today at 4pm. He should bring a preop testing form from [facility] to you.  Thanks!

## 2023-01-24 NOTE — PROGRESS NOTES
Subjective:       Patient ID: Daniel Garcias is a 47 y.o. male.    Chief Complaint: Pre-op Exam    Patient referred for a preoperative clearance. No acute complaints today.     Specific complaints - see dictation and please see ROS.    Past, Surgical, Family, Social histories; Medications, allergies - reviewed and reconciled.    Health maintenance file reviewed and addressed items due.    Problem list items reviewed and modified or added entries (in the overview section) may not be transcribed into this encounter note due to note writer format.      Back surgery.    Dysria one episode 2 weeks ago, resolved. Exercises on elliptical 2-3 times weekly, no problems. Entergy .    Has resumed lipitor.    Review of Systems   Constitutional:  Negative for appetite change, chills, diaphoresis, fatigue and fever.   HENT:  Negative for congestion, postnasal drip, rhinorrhea, sore throat and trouble swallowing.    Eyes:  Negative for visual disturbance.   Respiratory:  Negative for cough, choking, chest tightness, shortness of breath and wheezing.    Cardiovascular:  Negative for chest pain and leg swelling.   Gastrointestinal:  Negative for abdominal distention, abdominal pain, diarrhea, nausea and vomiting.   Genitourinary:  Negative for difficulty urinating and hematuria.   Musculoskeletal:  Positive for back pain and myalgias. Negative for arthralgias.   Skin:  Negative for rash.   Neurological:  Positive for numbness. Negative for weakness, light-headedness and headaches.   Psychiatric/Behavioral:  Negative for confusion and dysphoric mood.      Objective:      Physical Exam  Vitals and nursing note reviewed.   Constitutional:       Appearance: He is well-developed. He is not diaphoretic.   HENT:      Head: Normocephalic and atraumatic.   Eyes:      General: No scleral icterus.     Conjunctiva/sclera: Conjunctivae normal.   Neck:      Vascular: No carotid bruit.   Cardiovascular:      Rate and Rhythm:  Normal rate and regular rhythm.      Heart sounds: Normal heart sounds. No murmur heard.    No friction rub. No gallop.   Pulmonary:      Effort: Pulmonary effort is normal. No respiratory distress.      Breath sounds: Normal breath sounds. No wheezing or rales.   Abdominal:      General: There is no distension.      Tenderness: There is no abdominal tenderness.   Musculoskeletal:         General: No deformity.      Cervical back: Normal range of motion and neck supple.   Skin:     General: Skin is warm and dry.      Findings: No erythema or rash.   Neurological:      Mental Status: He is alert and oriented to person, place, and time.      Cranial Nerves: No cranial nerve deficit.      Motor: No tremor.      Coordination: Coordination normal.      Gait: Gait normal.   Psychiatric:         Behavior: Behavior normal.         Thought Content: Thought content normal.         Judgment: Judgment normal.       Assessment:       1. Preoperative clearance    2. Right leg numbness    3. Lumbar spondylosis    4. Mixed hyperlipidemia    5. Prostate cancer screening          Plan:     Medication List with Changes/Refills   Current Medications    ATORVASTATIN (LIPITOR) 10 MG TABLET    TAKE 1 TABLET(10 MG) BY MOUTH EVERY DAY    SILDENAFIL (VIAGRA) 100 MG TABLET    Take 1 tablet (100 mg total) by mouth daily as needed for Erectile Dysfunction.     1. Preoperative clearance  -     CBC Without Differential; Future; Expected date: 01/24/2023  -     Protime-INR; Future; Expected date: 01/24/2023  -     APTT; Future; Expected date: 01/24/2023  -     Urinalysis; Future; Expected date: 01/24/2023  -     Urinalysis Microscopic; Future; Expected date: 01/24/2023  -     EKG 12-lead; Future  -     Comprehensive Metabolic Panel; Future; Expected date: 01/24/2023  -     Urine culture; Future; Expected date: 01/24/2023    2. Right leg numbness    3. Lumbar spondylosis    4. Mixed hyperlipidemia  Overview:  Results for BLOSSOM ALCANTARA (MRN  [id number]) as of 7/16/2019 08:54   Ref. Range 6/29/2019 09:09   Cholesterol Latest Ref Range: 120 - 199 mg/dL 252 (H)   HDL Latest Ref Range: 40 - 75 mg/dL 57   Hdl/Cholesterol Ratio Latest Ref Range: 20.0 - 50.0 % 22.6   LDL Cholesterol External Latest Ref Range: 63.0 - 159.0 mg/dL 181.0 (H)   Non-HDL Cholesterol Latest Units: mg/dL 195   Total Cholesterol/HDL Ratio Latest Ref Range: 2.0 - 5.0  4.4   Triglycerides Latest Ref Range: 30 - 150 mg/dL 70       Orders:  -     Lipid Panel; Future; Expected date: 01/24/2023    5. Prostate cancer screening  -     PSA, Screening; Future; Expected date: 01/24/2023      See meds, orders, follow up, routing and instructions sections of encounter and AVS. Discussed with patient and provided on AVS.    Lab Results   Component Value Date     07/27/2022    K 3.9 07/27/2022     07/27/2022    BUN 11 07/27/2022    CREATININE 1.1 07/27/2022    GLU 94 07/27/2022    HGBA1C 5.3 07/27/2022    MG 2.3 11/19/2007    AST 11 07/27/2022    ALT 21 07/27/2022    ALBUMIN 4.0 07/27/2022    PROT 7.5 07/27/2022    BILITOT 1.0 07/27/2022    CHOL 281 (H) 07/27/2022    HDL 56 07/27/2022    LDLCALC 206.6 (H) 07/27/2022    TRIG 92 07/27/2022    WBC 4.80 07/27/2022    HGB 15.6 07/27/2022    HCT 46.6 07/27/2022     07/27/2022    PSA 0.58 02/11/2021    TSH 0.616 07/27/2022         Surgical Risk Assessment     Active cardiac issues:  Active decompensated heart failure? No   Unstable angina?  No   Significant uncontrolled arrhythmias? No   Severe valvular heart disease-Aortic or Mitral Stenosis? No   Recent MI or coronary revascularization < 30 days? No     Clinical risk factors predicting perioperative major adverse cardiac events per RCRI  High risk surgery (suprainguinal vascular, intraperitoneal, or intrathoracic surgery)? No   History of CAD/ischemic heart disease? No   History of cerebrovascular disease (CVA or TIA)? No   History of compensated heart failure? No   Type 2 diabetes  requiring insulin? No   Serum Creatinine > 2? No   Total cardiac risk factors 0     0 predictors = 3.9%, 1 predictor = 6.0%, 2 predictors = 10.1%, ?3 predictors = >15%    According to the revised cardiac risk index, the risk of sunita-procedural major cardiac complications (cardiac death, nonfatal MI, nonfatal cardiac arrest, postoperative cardiogenic pulmonary edema, complete heart block) is: 3.9 .     From Duceppe 2017, based on pooled data from 5 high quality external validations (4 prospective). These numbers are higher than those often quoted from the now-outdated original study (Eulalio 1999).    If patient has a low risk of MACE (<1%), proceed to surgery. If the patient is at elevated risk of MACE, then determine functional capacity (pt reported activity or DASI model).     If the patient has moderate, good, or excellent functional capacity (?4 METs), then proceed to surgery without further evaluation. If patient has poor or unknown functional capacity, will further testing impact decision making or perioperative care? If yes, then pharmacological stress testing is appropriate. In those patients with unknown functional capacity, exercise stress testing may be reasonable to perform.     Patient's functional mets: over 4    < 4 METs -unable to walk > 2 blocks on level ground without stopping due to symptoms  - eating, dressing, toileting, walking indoors, light housework. POOR   > 4 METs -climbing > 1 flight of stairs without stopping  -walking up hill > 1-2 blocks  -scrubbing floors  -moving furniture  - golf, bowling, dancing or tennis  -running short distance MODERATE to EXCELLENT     OR   [email or web address]-activity-status-index-[email or web address]    Will review labs and EKG for clearance.      3/10/2023 addendum note:  Patient's surgery was delayed due to insurance coverage issue.  Preop center asking for another clearance, he was just seen in January.  Called patient today, no significant interval history.  Nothing  has changed in regards to his health status.  Will update clearance with today's date. Clear for surgery and cc to referring physician.

## 2023-01-25 ENCOUNTER — LAB VISIT (OUTPATIENT)
Dept: LAB | Facility: HOSPITAL | Age: 48
End: 2023-01-25
Attending: FAMILY MEDICINE
Payer: COMMERCIAL

## 2023-01-25 ENCOUNTER — TELEPHONE (OUTPATIENT)
Dept: PREADMISSION TESTING | Facility: HOSPITAL | Age: 48
End: 2023-01-25
Payer: COMMERCIAL

## 2023-01-25 DIAGNOSIS — Z12.5 PROSTATE CANCER SCREENING: ICD-10-CM

## 2023-01-25 DIAGNOSIS — Z01.818 PREOPERATIVE CLEARANCE: ICD-10-CM

## 2023-01-25 DIAGNOSIS — E78.2 MIXED HYPERLIPIDEMIA: ICD-10-CM

## 2023-01-25 LAB
ALBUMIN SERPL BCP-MCNC: 3.8 G/DL (ref 3.5–5.2)
ALP SERPL-CCNC: 47 U/L (ref 55–135)
ALT SERPL W/O P-5'-P-CCNC: 15 U/L (ref 10–44)
ANION GAP SERPL CALC-SCNC: 8 MMOL/L (ref 8–16)
APTT BLDCRRT: 31.9 SEC (ref 21–32)
AST SERPL-CCNC: 10 U/L (ref 10–40)
BILIRUB SERPL-MCNC: 0.7 MG/DL (ref 0.1–1)
BUN SERPL-MCNC: 12 MG/DL (ref 6–20)
CALCIUM SERPL-MCNC: 9.1 MG/DL (ref 8.7–10.5)
CHLORIDE SERPL-SCNC: 104 MMOL/L (ref 95–110)
CHOLEST SERPL-MCNC: 204 MG/DL (ref 120–199)
CHOLEST/HDLC SERPL: 4.1 {RATIO} (ref 2–5)
CO2 SERPL-SCNC: 29 MMOL/L (ref 23–29)
COMPLEXED PSA SERPL-MCNC: 0.73 NG/ML (ref 0–4)
CREAT SERPL-MCNC: 1.1 MG/DL (ref 0.5–1.4)
ERYTHROCYTE [DISTWIDTH] IN BLOOD BY AUTOMATED COUNT: 13.4 % (ref 11.5–14.5)
EST. GFR  (NO RACE VARIABLE): >60 ML/MIN/1.73 M^2
GLUCOSE SERPL-MCNC: 93 MG/DL (ref 70–110)
HCT VFR BLD AUTO: 45.7 % (ref 40–54)
HDLC SERPL-MCNC: 50 MG/DL (ref 40–75)
HDLC SERPL: 24.5 % (ref 20–50)
HGB BLD-MCNC: 14.6 G/DL (ref 14–18)
INR PPP: 1.2 (ref 0.8–1.2)
LDLC SERPL CALC-MCNC: 139 MG/DL (ref 63–159)
MCH RBC QN AUTO: 28.2 PG (ref 27–31)
MCHC RBC AUTO-ENTMCNC: 31.9 G/DL (ref 32–36)
MCV RBC AUTO: 88 FL (ref 82–98)
NONHDLC SERPL-MCNC: 154 MG/DL
PLATELET # BLD AUTO: 286 K/UL (ref 150–450)
PMV BLD AUTO: 9.9 FL (ref 9.2–12.9)
POTASSIUM SERPL-SCNC: 4 MMOL/L (ref 3.5–5.1)
PROT SERPL-MCNC: 7.1 G/DL (ref 6–8.4)
PROTHROMBIN TIME: 12 SEC (ref 9–12.5)
RBC # BLD AUTO: 5.17 M/UL (ref 4.6–6.2)
SODIUM SERPL-SCNC: 141 MMOL/L (ref 136–145)
TRIGL SERPL-MCNC: 75 MG/DL (ref 30–150)
WBC # BLD AUTO: 4.18 K/UL (ref 3.9–12.7)

## 2023-01-25 PROCEDURE — 85730 THROMBOPLASTIN TIME PARTIAL: CPT | Performed by: FAMILY MEDICINE

## 2023-01-25 PROCEDURE — 85610 PROTHROMBIN TIME: CPT | Performed by: FAMILY MEDICINE

## 2023-01-25 PROCEDURE — 85027 COMPLETE CBC AUTOMATED: CPT | Performed by: FAMILY MEDICINE

## 2023-01-25 PROCEDURE — 36415 COLL VENOUS BLD VENIPUNCTURE: CPT | Performed by: FAMILY MEDICINE

## 2023-01-25 PROCEDURE — 80061 LIPID PANEL: CPT | Performed by: FAMILY MEDICINE

## 2023-01-25 PROCEDURE — 84153 ASSAY OF PSA TOTAL: CPT | Performed by: FAMILY MEDICINE

## 2023-01-25 PROCEDURE — 80053 COMPREHEN METABOLIC PANEL: CPT | Performed by: FAMILY MEDICINE

## 2023-01-26 LAB — BACTERIA UR CULT: NORMAL

## 2023-01-27 ENCOUNTER — HOSPITAL ENCOUNTER (OUTPATIENT)
Dept: CARDIOLOGY | Facility: CLINIC | Age: 48
Discharge: HOME | End: 2023-01-27
Attending: FAMILY MEDICINE
Payer: COMMERCIAL

## 2023-01-27 DIAGNOSIS — Z01.818 PREOPERATIVE CLEARANCE: ICD-10-CM

## 2023-01-27 PROCEDURE — 93010 EKG 12-LEAD: ICD-10-PCS | Mod: S$GLB,,, | Performed by: INTERNAL MEDICINE

## 2023-01-27 PROCEDURE — 93010 ELECTROCARDIOGRAM REPORT: CPT | Mod: S$GLB,,, | Performed by: INTERNAL MEDICINE

## 2023-01-27 PROCEDURE — 93005 EKG 12-LEAD: ICD-10-PCS | Mod: S$GLB,,, | Performed by: FAMILY MEDICINE

## 2023-01-27 PROCEDURE — 93005 ELECTROCARDIOGRAM TRACING: CPT | Mod: S$GLB,,, | Performed by: FAMILY MEDICINE

## 2023-02-15 ENCOUNTER — LAB VISIT (OUTPATIENT)
Dept: LAB | Facility: HOSPITAL | Age: 48
End: 2023-02-15
Attending: ANESTHESIOLOGY
Payer: COMMERCIAL

## 2023-02-15 DIAGNOSIS — Z01.818 PREOPERATIVE TESTING: ICD-10-CM

## 2023-02-15 LAB
ABO + RH BLD: NORMAL
BLD GP AB SCN CELLS X3 SERPL QL: NORMAL

## 2023-02-15 PROCEDURE — 86900 BLOOD TYPING SEROLOGIC ABO: CPT | Performed by: ANESTHESIOLOGY

## 2023-02-15 PROCEDURE — 36415 COLL VENOUS BLD VENIPUNCTURE: CPT | Performed by: ANESTHESIOLOGY

## 2023-02-24 ENCOUNTER — TELEPHONE (OUTPATIENT)
Dept: NEUROSURGERY | Facility: CLINIC | Age: 48
End: 2023-02-24
Payer: COMMERCIAL

## 2023-02-24 NOTE — TELEPHONE ENCOUNTER
P/c to pt. At 12noon to go over pre-op instructions.  Instructed pt. To arrive at [facility] on Monday 2/27/23 at 8:15am. 1st floor.  Instructed to remain NPO after midnight the night before surgery.  Also to shower with hibicleanse or dial antibacterial soap the night before and the morning of surgery. Pt verbalized understanding.

## 2023-02-24 NOTE — TELEPHONE ENCOUNTER
After numerous phone calls regarding pts Upcoming surgery this Monday 3/27/23 at [place], I was told ins. Had not auth yet... a pre service rep had sent additional information but it wasn't enough.  They sent the p/c to me and I spoke with daria ramos pre auth rep. She explained that they needed additional information including 6 weeks of PT notes, surgery plans, and a full pain assessment. Her phone number is [phone] and her fax number is [phone] however she stated it is now 5pm where she is located and was leaving for the day. She said she would work on it Monday.   I gave her name of surgery however our Md Note from January visit did not have a full pain assessment. Also, pt. Was referred to PT at end of last year however he only went to intial visit and did not complete all therapy sessions.  I did speak with pt. And he wants to cancel the surgery until we can get it approved. I did recommend that he contact his ins.co to let them know that he did  continue his own physical therapy regimen at home however I am not sure that will suffice. [facility] dept and dr. Gordon have been notified that surgery is not authorized and pt wants to cancel.

## 2023-02-27 ENCOUNTER — TELEPHONE (OUTPATIENT)
Dept: NEUROSURGERY | Facility: CLINIC | Age: 48
End: 2023-02-27
Payer: COMMERCIAL

## 2023-02-27 NOTE — TELEPHONE ENCOUNTER
P/c to pt at the number he left and again got his voicemail and left another message indicating we will be forwarding more information to the insurance company tomorrow and probably won't have information regarding the status until the end of the week.

## 2023-02-27 NOTE — TELEPHONE ENCOUNTER
----- Message from Christy Hinojosa sent at 2/27/2023  2:23 PM CST -----  Contact: [phone]  The patient is calling from a missed call and would like to speak with the staff.  the patient would like a call back at your earliest convince.  The pt can be reached at [phone]

## 2023-02-27 NOTE — TELEPHONE ENCOUNTER
P/c to pt.left msg on his voicemail stating that ALEXANDER Cox is going to send information to the insurance company stating how many PT visits were done and that he failed conservative tx. Will fax info to insurance company and see if we can get the authoriztion for the surgery.

## 2023-02-27 NOTE — TELEPHONE ENCOUNTER
----- Message from Mir Pastrana MA sent at 2/27/2023  9:24 AM CST -----  Contact: [phone]  Pt is requesting to speak with Tamica in regards to procedure he had today. Please reach back out to pt at [phone].

## 2023-03-09 ENCOUNTER — TELEPHONE (OUTPATIENT)
Dept: INTERNAL MEDICINE | Facility: CLINIC | Age: 48
End: 2023-03-09

## 2023-03-09 NOTE — TELEPHONE ENCOUNTER
----- Message from Tamica Collins RN sent at 3/8/2023  2:09 PM CST -----  Tristan Izquierdo,  The above pt was scheduled for surgery with dr. Saad Gordon on 2/27/23 for right L5-S1 microdiscectomy however the ins. Did not give auth and we had to hold off.  We are now trying to reschedule his surgery and our pre-admit dept has stated that the surgery clearance that you provided is now past 30 days.  Is there a way you can supply another surgery clearance so we can get this pt scheduled asap.  The fax number to submit the clearance to [phone]  Thank you,  Tamica Collins RN  [facility]

## 2023-03-10 ENCOUNTER — TELEPHONE (OUTPATIENT)
Dept: NEUROSURGERY | Facility: CLINIC | Age: 48
End: 2023-03-10
Payer: COMMERCIAL

## 2023-03-10 ENCOUNTER — TELEPHONE (OUTPATIENT)
Dept: INTERNAL MEDICINE | Facility: CLINIC | Age: 48
End: 2023-03-10
Payer: COMMERCIAL

## 2023-03-10 DIAGNOSIS — Z01.818 PREOPERATIVE TESTING: Primary | ICD-10-CM

## 2023-03-10 DIAGNOSIS — M51.26 HERNIATED NUCLEUS PULPOSUS, LUMBAR: Primary | ICD-10-CM

## 2023-03-10 NOTE — PRE-PROCEDURE INSTRUCTIONS
Patient stated that his medical condition is the same as  on 1/24. He said his medication are the same as well.  Did not want me to go over preop and med instructions. He asked if I could send them to his My  [facility] portal.I told him that I would. Will need updated medical clearance from your PCP,  Dr. Donte Izquierdo.He will call to see if can get updated clearance or will need a new appt.    Patient stated has not had any problem with anesthesia in the past. Will need medical clearance form your PCP,Dr.Matt Izquierdo. He has an appt 1/24 at 4pm. He has a preop testing form from  Dr. Gordon to bring to his PCP. Will need T&S. Our  will call to set up this appt.  Preop instructions given. Hold aspirin, aspirin containing products, nsaids(aleve, advil, motrin, ibuprofen, naprosyn, naproxen, voltaren, diclofenac), vitamins and supplements one week prior to surgery.     May take Tylenol.  Medication instructions given:  atorvastatin (LIPITOR) 10 MG tablet 90 tablet 2 12/2/2022     Sig: TAKE 1 TABLET(10 MG) BY MOUTH EVERY DAY             Julia Mendoza RN 1/24/2023  1:15 PM   TAKE IN AM OF SURGERY.              sildenafiL (VIAGRA) 100 MG tablet 30 tablet 2 8/18/2022     Sig: Take 1 tablet (100 mg total) by mouth daily as needed for Erectile Dysfunction.   Class: Print   Route: Oral             Julia Mendoza RN 1/24/2023  1:14 PM   HOLD IN AM OF SURGERY.             Your surgery has been scheduled for:Monday 2/27/2023__________________________________________  Periop Center ( [place]) [phone]  You should report to:  _x___[facility], [address], [facility]  ____[facility], located on the [place] side of the first floor of the           [facility] ([phone])  ____[facility], located on the [facility] side of the            Second floor of the [facility] ([phone])  ____3rd Floor [facility] located on the [facility] side of  the [facility] [phone]  Please Note   Tell your doctor if you take Aspirin, products containing Aspirin, herbal medications  or blood thinners, such as Coumadin, Ticlid, or Plavix.  (Consult your provider regarding holding or stopping before surgery).  Arrange for someone to drive you home following surgery.  You will not be allowed to leave the surgical facility alone or drive yourself home following sedation and anesthesia.  Before Surgery  Stop taking all vitamins/ herbal medications 14days prior to surgery  No Motrin/Advil (Ibuprofen) 7 days before surgery  No Aleve (Naproxen) 7 days before surgery  Stop Taking Asprin, products containing Asprin _7____days before surgery  Stop taking blood thinners_______days before surgery  No Goody's/BC  Powder 7 days before surgery  Refrain from drinking alcoholic beverages for 24hours before and after surgery  Stop or limit smoking _________days before surgery( nonsmoker)  You may take Tylenol for pain  Night before Surgery  Nothing to eat or drink after midnight.  Take a shower or bath (shower is recommended).  Bathe with Hibiclens soap or an antibacterial soap from the neck down.  If not supplied by your surgeon, hibiclens soap will need to be purchased over the counter in pharmacy.  Rinse soap off thoroughly.  Shampoo your hair with your regular shampoo  The Day of Surgery  NOTHING TO  DRINK 2 hours before arrival time. If you are told to take medication on the morning of surgery, it may be taken with a sip of water.   Take another bath or shower with hibiclens or any antibacterial soap, to reduce the chance of infection.  Take heart and blood pressure medications with a small sip of water, as advised by the perioperative team.  Do not take fluid pills  You may brush your teeth and rinse your mouth, but do not swall any additional water.   Do not apply perfumes, powder, body lotions or deodorant on the day of surgery.  Nail polish should be  removed.  Do not wear makeup or moisturizer  Wear comfortable clothes, such as a button front shirt and loose fitting pants.  Leave all jewelry, including body piercings, and valuables at home.    Bring any devices you will neeed after surgery such as crutches or canes.  If you have sleep apnea, please bring your CPAP machine  In the event that your physical condition changes including the onset of a cold or respiratory illness, or if you have to delay or cancel your surgery, please notify your surgeon.   Directions to the surgery center given. Verbalizes understanding. Sent preop and med instructions to My [employer] portal.  Directions to the surgery center given. Verbalizes understanding. Sent preop and med instructions to My [employer] portal.

## 2023-03-10 NOTE — TELEPHONE ENCOUNTER
----- Message from Eleonora Stevenson sent at 3/10/2023  9:06 AM CST -----  Regarding: r/s procedure  The patient called requesting to r/s procedure  Please reach back at your earliest opportunity    No further information provided      Patient can be contacted @# [phone] (home)

## 2023-03-10 NOTE — TELEPHONE ENCOUNTER
----- Message from Julia Mendoza RN sent at 3/10/2023 12:10 PM CST -----  Patient is rescheduled for Right L5-S1 microdiscectomy on 3/27 with Dr. Gordon.( Approximately 185 minutes of general anesthesia)He will need updated medical clearance.He was last seen by  you on 1/24. You may clear per chart with an updated clearance. If not, then please schedule a preop clearance appt.   Thanks!

## 2023-03-10 NOTE — ANESTHESIA PAT ROS NOTE
03/10/2023  Daniel Garcias is a 47 y.o., male.      Pre-op Assessment          Review of Systems  Anesthesia Hx:  No problems with previous Anesthesia             Denies Family Hx of Anesthesia complications.    Denies Personal Hx of Anesthesia complications.                    Social:  Non-Smoker, No Alcohol Use       Hematology/Oncology:  Hematology Normal   Oncology Normal                                   EENT/Dental:  EENT/Dental Normal           Cardiovascular:            Denies Angina.          Functional Capacity 4 METS, ABLE TO CLIMB 2 FLIGHTS OF STAIRS                         Pulmonary:       Denies Shortness of breath.  Denies Recent URI.                 Renal/:  Renal/ Normal                 Hepatic/GI:  Hepatic/GI Normal                 Musculoskeletal:   Musculoskeletal General/Symptoms:  Functional capacity is ambulatory without assistance.   Joint Disease:  Arthritis OF BACK PER PATIENT       Lumbar Spine Disorders HERNIATED NUCLEUS PULPOSUS-LUMBAR  Neurological:           H/O TRIGEMINAL NEURALGIA , CERVICAL RADICULOPATHY PER EPIC Neuro Symptoms of pain OF LOWER BACK AND RLEDx of Headaches                           Endocrine:        Metabolic Disorders, Obesity / BMI > 30  Psych:  Psychiatric Normal                       Anesthesia Assessment: Preoperative EQUATION    Planned Procedure: Procedure(s) (LRB):  RIGHT L5-S1 MICRODISCECTOMY,MINIMALLY INVASIVE APPROACH (N/A)  Requested Anesthesia Type:General  Surgeon: Saad Gordon MD  Service: Neurosurgery  Known or anticipated Date of Surgery:3/27/2023    Surgeon notes: reviewed    Electronic QUestionnaire Assessment completed via nurse interview with patient.        Triage considerations:     The patient has no apparent active cardiac condition (No unstable coronary Syndrome such as severe unstable angina or recent [<1 month] myocardial  infarction, decompensated CHF, severe valvular   disease or significant arrhythmia)    Previous anesthesia records:MAC and No problems  4/11/2018 COLONOSCOPY   Airway Placement Date: 04/11/18 Placement Time: 1432 Airway Device: Nasal Cannula     Last PCP note: within 3 months , within [facility]   Subspecialty notes: Neurology, Neurosurgery    Other important co-morbidities: HLD, Obesity, and Herniated Lumbar Disc       Tests already available:  Available tests,  within 3 months , 3-6 months ago , 6-12 months ago , within [facility] . 2/15/2023 CMP, PTT, PT/INR, CBC, UA, MICRO UA, 1/27/2023 EKG, 10/6/2022 XRAY LUMBAR SPINE F&E ONLY, XRAY SCOLIOSIS COMPLETE INCLUDING S&E, 8/10/2022 MRI LUMBAR SPINE W/O CONTRAST                Instructions given. (See in Nurse's note)    Optimization:  Anesthesia Preop Clinic Assessment -not  Indicated for this surgery    Medical Opinion Indicated           Plan:    Testing:  T&S     Consultation:Patient's PCP for re-evaluation( UPDATED MEDICAL CLEARANCE)     Patient  has previously scheduled Medical Appointment:NONE    Navigation: Tests Scheduled. T&S IN AM OF SURGERY             Consults scheduled.TBD             Results will be tracked by Preop Clinic.  1/25 UA resulted and noted by Dr. Ronak Izquierdo.  1/26 Labs resulted and noted by . Medical clearance given by Dr. Izquierdo,  Pending: Will review labs and EKG for clearance.  2/1 Dr. Izquierdo reviewed labs, and EKG for medical clearance on 1/28: Your labs/tests are within acceptable range. Good news  2/15 T&S resulted and noted by Dr. Dominic Rosenberg.  2/27 Surgery was postponed due to not having insurance approval.  3/10 Updated medical clearance:  3/10/2023 addendum note:  Patient's surgery was delayed due to insurance coverage issue.  Preop center asking for another clearance, he was just seen in January.  Called patient today, no significant interval history.  Nothing has changed in regards to his health status.  Will update  clearance with today's date. Clear for surgery and cc to referring physician.         Electronically signed by Donte Izquierdo MD at 3/10/2023  2:47 PM  3/15 T&S resulted and noted by Dr. Kenney..   Julia Mendoza RN BSN

## 2023-03-13 ENCOUNTER — TELEPHONE (OUTPATIENT)
Dept: PREADMISSION TESTING | Facility: HOSPITAL | Age: 48
End: 2023-03-13
Payer: COMMERCIAL

## 2023-03-13 NOTE — TELEPHONE ENCOUNTER
----- Message from Julia Mendoza RN sent at 3/10/2023  3:12 PM CST -----  Surgery 3/27  Please schedule T&S.  Thanks!

## 2023-03-14 ENCOUNTER — LAB VISIT (OUTPATIENT)
Dept: LAB | Facility: HOSPITAL | Age: 48
End: 2023-03-14
Attending: ANESTHESIOLOGY
Payer: COMMERCIAL

## 2023-03-14 DIAGNOSIS — Z01.818 PREOPERATIVE TESTING: ICD-10-CM

## 2023-03-14 LAB
ABO + RH BLD: NORMAL
BLD GP AB SCN CELLS X3 SERPL QL: NORMAL

## 2023-03-14 PROCEDURE — 36415 COLL VENOUS BLD VENIPUNCTURE: CPT | Performed by: ANESTHESIOLOGY

## 2023-03-14 PROCEDURE — 86900 BLOOD TYPING SEROLOGIC ABO: CPT | Performed by: ANESTHESIOLOGY

## 2023-03-20 ENCOUNTER — ANESTHESIA EVENT (OUTPATIENT)
Dept: SURGERY | Facility: HOSPITAL | Age: 48
End: 2023-03-20
Payer: COMMERCIAL

## 2023-03-24 ENCOUNTER — TELEPHONE (OUTPATIENT)
Dept: NEUROSURGERY | Facility: CLINIC | Age: 48
End: 2023-03-24
Payer: COMMERCIAL

## 2023-03-24 NOTE — TELEPHONE ENCOUNTER
P/c to pt. Regarding pre op instructions.  Instructed pt. To remain npo after midnight Sunday. To bathe in hibicleanse or dial antibacterial soap Sunday night and Monday morning and to arrive at 11:15am at [place] on Monday 3/27/23  location for surgery. Pt. Verbalized understanding.

## 2023-03-27 ENCOUNTER — ANESTHESIA (OUTPATIENT)
Dept: SURGERY | Facility: HOSPITAL | Age: 48
End: 2023-03-27
Payer: COMMERCIAL

## 2023-03-27 ENCOUNTER — HOSPITAL ENCOUNTER (OUTPATIENT)
Facility: HOSPITAL | Age: 48
Discharge: HOME | End: 2023-03-27
Attending: NEUROLOGICAL SURGERY | Admitting: NEUROLOGICAL SURGERY
Payer: COMMERCIAL

## 2023-03-27 DIAGNOSIS — M54.17 LUMBOSACRAL RADICULOPATHY: ICD-10-CM

## 2023-03-27 PROCEDURE — D9220A PRA ANESTHESIA: ICD-10-PCS | Mod: CRNA,,, | Performed by: NURSE ANESTHETIST, CERTIFIED REGISTERED

## 2023-03-27 PROCEDURE — 25000003 PHARM REV CODE 250: Performed by: STUDENT IN AN ORGANIZED HEALTH CARE EDUCATION/TRAINING PROGRAM

## 2023-03-27 PROCEDURE — 63600175 PHARM REV CODE 636 W HCPCS: Performed by: NEUROLOGICAL SURGERY

## 2023-03-27 PROCEDURE — 94761 N-INVAS EAR/PLS OXIMETRY MLT: CPT

## 2023-03-27 PROCEDURE — 63047 LAM FACETEC & FORAMOT LUMBAR: CPT | Mod: AS,,, | Performed by: PHYSICIAN ASSISTANT

## 2023-03-27 PROCEDURE — 63600175 PHARM REV CODE 636 W HCPCS: Performed by: NURSE ANESTHETIST, CERTIFIED REGISTERED

## 2023-03-27 PROCEDURE — 25000003 PHARM REV CODE 250: Performed by: PHYSICIAN ASSISTANT

## 2023-03-27 PROCEDURE — D9220A PRA ANESTHESIA: Mod: CRNA,,, | Performed by: NURSE ANESTHETIST, CERTIFIED REGISTERED

## 2023-03-27 PROCEDURE — 71000033 HC RECOVERY, INTIAL HOUR: Performed by: NEUROLOGICAL SURGERY

## 2023-03-27 PROCEDURE — 27201423 OPTIME MED/SURG SUP & DEVICES STERILE SUPPLY: Performed by: NEUROLOGICAL SURGERY

## 2023-03-27 PROCEDURE — 25000003 PHARM REV CODE 250: Performed by: NURSE ANESTHETIST, CERTIFIED REGISTERED

## 2023-03-27 PROCEDURE — 99900035 HC TECH TIME PER 15 MIN (STAT)

## 2023-03-27 PROCEDURE — D9220A PRA ANESTHESIA: Mod: ANES,,, | Performed by: SURGERY

## 2023-03-27 PROCEDURE — 63047 LAM FACETEC & FORAMOT LUMBAR: CPT | Mod: ,,, | Performed by: NEUROLOGICAL SURGERY

## 2023-03-27 PROCEDURE — 37000008 HC ANESTHESIA 1ST 15 MINUTES: Performed by: NEUROLOGICAL SURGERY

## 2023-03-27 PROCEDURE — D9220A PRA ANESTHESIA: ICD-10-PCS | Mod: ANES,,, | Performed by: SURGERY

## 2023-03-27 PROCEDURE — 71000015 HC POSTOP RECOV 1ST HR: Performed by: NEUROLOGICAL SURGERY

## 2023-03-27 PROCEDURE — 37000009 HC ANESTHESIA EA ADD 15 MINS: Performed by: NEUROLOGICAL SURGERY

## 2023-03-27 PROCEDURE — 25000003 PHARM REV CODE 250: Performed by: NEUROLOGICAL SURGERY

## 2023-03-27 PROCEDURE — 36000710: Performed by: NEUROLOGICAL SURGERY

## 2023-03-27 PROCEDURE — 63047 PR LAMINEC/FACETECT/FORAMIN,LUMBAR 1 SEG: ICD-10-PCS | Mod: ,,, | Performed by: NEUROLOGICAL SURGERY

## 2023-03-27 PROCEDURE — 36000711: Performed by: NEUROLOGICAL SURGERY

## 2023-03-27 PROCEDURE — 63047 PR LAMINEC/FACETECT/FORAMIN,LUMBAR 1 SEG: ICD-10-PCS | Mod: AS,,, | Performed by: PHYSICIAN ASSISTANT

## 2023-03-27 RX ORDER — EPHEDRINE SULFATE 50 MG/ML
INJECTION, SOLUTION INTRAVENOUS
Status: DISCONTINUED | OUTPATIENT
Start: 2023-03-27 | End: 2023-03-27

## 2023-03-27 RX ORDER — CEFAZOLIN SODIUM 1 G/3ML
INJECTION, POWDER, FOR SOLUTION INTRAMUSCULAR; INTRAVENOUS
Status: DISCONTINUED | OUTPATIENT
Start: 2023-03-27 | End: 2023-03-27

## 2023-03-27 RX ORDER — FENTANYL CITRATE 50 UG/ML
INJECTION, SOLUTION INTRAMUSCULAR; INTRAVENOUS
Status: DISCONTINUED | OUTPATIENT
Start: 2023-03-27 | End: 2023-03-27

## 2023-03-27 RX ORDER — SODIUM CHLORIDE 9 MG/ML
INJECTION, SOLUTION INTRAVENOUS CONTINUOUS
Status: DISCONTINUED | OUTPATIENT
Start: 2023-03-27 | End: 2023-03-27 | Stop reason: HOSPADM

## 2023-03-27 RX ORDER — PHENYLEPHRINE HYDROCHLORIDE 10 MG/ML
INJECTION INTRAVENOUS
Status: DISCONTINUED | OUTPATIENT
Start: 2023-03-27 | End: 2023-03-27

## 2023-03-27 RX ORDER — ONDANSETRON 2 MG/ML
INJECTION INTRAMUSCULAR; INTRAVENOUS
Status: DISCONTINUED | OUTPATIENT
Start: 2023-03-27 | End: 2023-03-27

## 2023-03-27 RX ORDER — ROCURONIUM BROMIDE 10 MG/ML
INJECTION, SOLUTION INTRAVENOUS
Status: DISCONTINUED | OUTPATIENT
Start: 2023-03-27 | End: 2023-03-27

## 2023-03-27 RX ORDER — MUPIROCIN 20 MG/G
OINTMENT TOPICAL 2 TIMES DAILY
Status: DISCONTINUED | OUTPATIENT
Start: 2023-03-27 | End: 2023-03-27 | Stop reason: HOSPADM

## 2023-03-27 RX ORDER — DEXMEDETOMIDINE HYDROCHLORIDE 100 UG/ML
INJECTION, SOLUTION INTRAVENOUS
Status: DISCONTINUED | OUTPATIENT
Start: 2023-03-27 | End: 2023-03-27

## 2023-03-27 RX ORDER — HALOPERIDOL 5 MG/ML
0.5 INJECTION INTRAMUSCULAR EVERY 10 MIN PRN
Status: DISCONTINUED | OUTPATIENT
Start: 2023-03-27 | End: 2023-03-27 | Stop reason: HOSPADM

## 2023-03-27 RX ORDER — SODIUM CHLORIDE 0.9 % (FLUSH) 0.9 %
10 SYRINGE (ML) INJECTION
Status: DISCONTINUED | OUTPATIENT
Start: 2023-03-27 | End: 2023-03-27 | Stop reason: HOSPADM

## 2023-03-27 RX ORDER — DEXAMETHASONE SODIUM PHOSPHATE 4 MG/ML
INJECTION, SOLUTION INTRA-ARTICULAR; INTRALESIONAL; INTRAMUSCULAR; INTRAVENOUS; SOFT TISSUE
Status: DISCONTINUED | OUTPATIENT
Start: 2023-03-27 | End: 2023-03-27

## 2023-03-27 RX ORDER — HYDROCODONE BITARTRATE AND ACETAMINOPHEN 10; 325 MG/1; MG/1
1 TABLET ORAL EVERY 4 HOURS PRN
Qty: 42 TABLET | Refills: 0 | Status: SHIPPED | OUTPATIENT
Start: 2023-03-27 | End: 2024-01-23

## 2023-03-27 RX ORDER — HYDROMORPHONE HYDROCHLORIDE 1 MG/ML
0.2 INJECTION, SOLUTION INTRAMUSCULAR; INTRAVENOUS; SUBCUTANEOUS EVERY 5 MIN PRN
Status: DISCONTINUED | OUTPATIENT
Start: 2023-03-27 | End: 2023-03-27 | Stop reason: HOSPADM

## 2023-03-27 RX ORDER — LIDOCAINE HYDROCHLORIDE 10 MG/ML
INJECTION, SOLUTION INTRAVENOUS
Status: DISCONTINUED | OUTPATIENT
Start: 2023-03-27 | End: 2023-03-27

## 2023-03-27 RX ORDER — ACETAMINOPHEN 500 MG
1000 TABLET ORAL
Status: COMPLETED | OUTPATIENT
Start: 2023-03-27 | End: 2023-03-27

## 2023-03-27 RX ORDER — METHYLPREDNISOLONE 4 MG/1
TABLET ORAL
Qty: 21 EACH | Refills: 0 | Status: SHIPPED | OUTPATIENT
Start: 2023-03-27 | End: 2023-04-17

## 2023-03-27 RX ORDER — OXYCODONE HYDROCHLORIDE 5 MG/1
10 TABLET ORAL EVERY 4 HOURS PRN
Status: DISCONTINUED | OUTPATIENT
Start: 2023-03-27 | End: 2023-03-27 | Stop reason: HOSPADM

## 2023-03-27 RX ORDER — KETAMINE HCL IN 0.9 % NACL 50 MG/5 ML
SYRINGE (ML) INTRAVENOUS
Status: DISCONTINUED | OUTPATIENT
Start: 2023-03-27 | End: 2023-03-27

## 2023-03-27 RX ORDER — METHOCARBAMOL 750 MG/1
750 TABLET, FILM COATED ORAL 3 TIMES DAILY
Qty: 30 TABLET | Refills: 0 | Status: SHIPPED | OUTPATIENT
Start: 2023-03-27 | End: 2023-04-06

## 2023-03-27 RX ORDER — SUCCINYLCHOLINE CHLORIDE 20 MG/ML
INJECTION INTRAMUSCULAR; INTRAVENOUS
Status: DISCONTINUED | OUTPATIENT
Start: 2023-03-27 | End: 2023-03-27

## 2023-03-27 RX ORDER — FAMOTIDINE 10 MG/ML
INJECTION INTRAVENOUS
Status: DISCONTINUED | OUTPATIENT
Start: 2023-03-27 | End: 2023-03-27

## 2023-03-27 RX ORDER — MULTIVITAMIN
1 TABLET ORAL DAILY
COMMUNITY

## 2023-03-27 RX ORDER — CELECOXIB 200 MG/1
400 CAPSULE ORAL ONCE
Status: COMPLETED | OUTPATIENT
Start: 2023-03-27 | End: 2023-03-27

## 2023-03-27 RX ORDER — MUPIROCIN 20 MG/G
OINTMENT TOPICAL
Status: DISCONTINUED | OUTPATIENT
Start: 2023-03-27 | End: 2023-03-27 | Stop reason: HOSPADM

## 2023-03-27 RX ORDER — BUPIVACAINE HYDROCHLORIDE AND EPINEPHRINE 5; 5 MG/ML; UG/ML
INJECTION, SOLUTION EPIDURAL; INTRACAUDAL; PERINEURAL
Status: DISCONTINUED | OUTPATIENT
Start: 2023-03-27 | End: 2023-03-27 | Stop reason: HOSPADM

## 2023-03-27 RX ORDER — VANCOMYCIN HYDROCHLORIDE 1 G/20ML
INJECTION, POWDER, LYOPHILIZED, FOR SOLUTION INTRAVENOUS
Status: DISCONTINUED | OUTPATIENT
Start: 2023-03-27 | End: 2023-03-27 | Stop reason: HOSPADM

## 2023-03-27 RX ORDER — METHYLPREDNISOLONE ACETATE 40 MG/ML
INJECTION, SUSPENSION INTRA-ARTICULAR; INTRALESIONAL; INTRAMUSCULAR; SOFT TISSUE
Status: DISCONTINUED | OUTPATIENT
Start: 2023-03-27 | End: 2023-03-27 | Stop reason: HOSPADM

## 2023-03-27 RX ORDER — METHOCARBAMOL 750 MG/1
750 TABLET, FILM COATED ORAL 3 TIMES DAILY
Status: DISCONTINUED | OUTPATIENT
Start: 2023-03-27 | End: 2023-03-27 | Stop reason: HOSPADM

## 2023-03-27 RX ORDER — MIDAZOLAM HYDROCHLORIDE 1 MG/ML
INJECTION INTRAMUSCULAR; INTRAVENOUS
Status: DISCONTINUED | OUTPATIENT
Start: 2023-03-27 | End: 2023-03-27

## 2023-03-27 RX ORDER — METHOCARBAMOL 500 MG/1
1000 TABLET, FILM COATED ORAL
Status: COMPLETED | OUTPATIENT
Start: 2023-03-27 | End: 2023-03-27

## 2023-03-27 RX ORDER — PROPOFOL 10 MG/ML
VIAL (ML) INTRAVENOUS
Status: DISCONTINUED | OUTPATIENT
Start: 2023-03-27 | End: 2023-03-27

## 2023-03-27 RX ORDER — HYDROCODONE BITARTRATE AND ACETAMINOPHEN 10; 325 MG/1; MG/1
1 TABLET ORAL EVERY 4 HOURS PRN
Status: DISCONTINUED | OUTPATIENT
Start: 2023-03-27 | End: 2023-03-27 | Stop reason: HOSPADM

## 2023-03-27 RX ADMIN — SUGAMMADEX 300 MG: 100 INJECTION, SOLUTION INTRAVENOUS at 03:03

## 2023-03-27 RX ADMIN — LIDOCAINE HYDROCHLORIDE 100 MG: 10 INJECTION, SOLUTION INTRAVENOUS at 01:03

## 2023-03-27 RX ADMIN — FAMOTIDINE 20 MG: 10 INJECTION, SOLUTION INTRAVENOUS at 01:03

## 2023-03-27 RX ADMIN — MIDAZOLAM HYDROCHLORIDE 2 MG: 1 INJECTION, SOLUTION INTRAMUSCULAR; INTRAVENOUS at 01:03

## 2023-03-27 RX ADMIN — PHENYLEPHRINE HYDROCHLORIDE 200 MCG: 10 INJECTION INTRAVENOUS at 01:03

## 2023-03-27 RX ADMIN — CEFAZOLIN 3 G: 330 INJECTION, POWDER, FOR SOLUTION INTRAMUSCULAR; INTRAVENOUS at 01:03

## 2023-03-27 RX ADMIN — Medication 30 MG: at 01:03

## 2023-03-27 RX ADMIN — SODIUM CHLORIDE: 9 INJECTION, SOLUTION INTRAVENOUS at 01:03

## 2023-03-27 RX ADMIN — DEXMEDETOMIDINE HYDROCHLORIDE 30 MCG: 100 INJECTION, SOLUTION INTRAVENOUS at 01:03

## 2023-03-27 RX ADMIN — ROCURONIUM BROMIDE 10 MG: 10 INJECTION, SOLUTION INTRAVENOUS at 01:03

## 2023-03-27 RX ADMIN — MUPIROCIN: 20 OINTMENT TOPICAL at 11:03

## 2023-03-27 RX ADMIN — DEXAMETHASONE SODIUM PHOSPHATE 8 MG: 4 INJECTION, SOLUTION INTRAMUSCULAR; INTRAVENOUS at 01:03

## 2023-03-27 RX ADMIN — CELECOXIB 400 MG: 200 CAPSULE ORAL at 11:03

## 2023-03-27 RX ADMIN — SUCCINYLCHOLINE CHLORIDE 180 MG: 20 INJECTION, SOLUTION INTRAMUSCULAR; INTRAVENOUS at 01:03

## 2023-03-27 RX ADMIN — ONDANSETRON 4 MG: 2 INJECTION, SOLUTION INTRAMUSCULAR; INTRAVENOUS at 01:03

## 2023-03-27 RX ADMIN — ROCURONIUM BROMIDE 40 MG: 10 INJECTION, SOLUTION INTRAVENOUS at 01:03

## 2023-03-27 RX ADMIN — SODIUM CHLORIDE, SODIUM GLUCONATE, SODIUM ACETATE, POTASSIUM CHLORIDE, MAGNESIUM CHLORIDE, SODIUM PHOSPHATE, DIBASIC, AND POTASSIUM PHOSPHATE: .53; .5; .37; .037; .03; .012; .00082 INJECTION, SOLUTION INTRAVENOUS at 01:03

## 2023-03-27 RX ADMIN — ACETAMINOPHEN 1000 MG: 500 TABLET ORAL at 11:03

## 2023-03-27 RX ADMIN — FENTANYL CITRATE 100 MCG: 50 INJECTION, SOLUTION INTRAMUSCULAR; INTRAVENOUS at 01:03

## 2023-03-27 RX ADMIN — SODIUM CHLORIDE: 9 INJECTION, SOLUTION INTRAVENOUS at 11:03

## 2023-03-27 RX ADMIN — EPHEDRINE SULFATE 10 MG: 50 INJECTION INTRAVENOUS at 02:03

## 2023-03-27 RX ADMIN — PROPOFOL 250 MG: 10 INJECTION, EMULSION INTRAVENOUS at 01:03

## 2023-03-27 RX ADMIN — METHOCARBAMOL 1000 MG: 500 TABLET ORAL at 11:03

## 2023-03-27 NOTE — ANESTHESIA PROCEDURE NOTES
Intubation    Date/Time: 3/27/2023 1:24 PM  Performed by: Marbella Diego CRNA  Authorized by: James Dunn MD     Intubation:     Induction:  Intravenous    Intubated:  Postinduction    Mask Ventilation:  Moderately difficult with oral airway    Attempts:  2    Attempted By:  CRNA    Method of Intubation:  Direct    Blade:  Marcus 2    Laryngeal View Grade: Grade IIb - only the arytenoids and epiglottis seen      Attempted By (2nd Attempt):  CRNA    Method of Intubation (2nd Attempt):  Video laryngoscopy    Laryngeal View Grade (2nd Attempt): Grade I - full view of cords      Difficult Airway Encountered?: No      Complications:  None    Airway Device:  Oral endotracheal tube    Airway Device Size:  7.5    Style/Cuff Inflation:  Cuffed    Inflation Amount (mL):  6    Tube secured:  21    Secured at:  The lips    Placement Verified By:  Capnometry    Complicating Factors:  Large/floppy epiglottis and large prominent central incisors (beard)    Findings Post-Intubation:  BS equal bilateral

## 2023-03-27 NOTE — TRANSFER OF CARE
"Anesthesia Transfer of Care Note    Patient: Daniel Garcias    Procedure(s) Performed: Procedure(s) (LRB):  RIGHT L5-S1 MICRODISCECTOMY,MINIMALLY INVASIVE APPROACH (N/A)    Patient location: PACU    Anesthesia Type: general    Transport from OR: Transported from OR on 6-10 L/min O2 by face mask with adequate spontaneous ventilation    Post pain: adequate analgesia    Post assessment: no apparent anesthetic complications    Post vital signs: stable    Level of consciousness: awake and sedated    Nausea/Vomiting: no nausea/vomiting    Complications: none    Transfer of care protocol was followed      Last vitals:   Visit Vitals  BP (!) 146/86 (BP Location: Right arm, Patient Position: Lying)   Pulse 60   Temp 37.1 °C (98.8 °F) (Temporal)   Resp 16   Ht 6' 4" (1.93 m)   Wt 124.7 kg (275 lb)   SpO2 98%   BMI 33.47 kg/m²     "

## 2023-03-27 NOTE — ANESTHESIA PREPROCEDURE EVALUATION
[facility]  Anesthesia Pre-Operative Evaluation         Patient Name: Daniel Garcias  YOB: 1975  MRN: [id number]    SUBJECTIVE:     03/27/2023    Pre-operative evaluation for Procedure(s) (LRB):  RIGHT L5-S1 MICRODISCECTOMY,MINIMALLY INVASIVE APPROACH (N/A)    Daniel Garcias is a 47 y.o. male with the medical history listed below who now presents for the above procedures.      Patient Active Problem List   Diagnosis    Family history of colon cancer    Hyperlipidemia    Trigeminal neuralgia    Vitamin D deficiency    Occipital neuralgia of right side    Right leg numbness    Decreased range of motion of lumbar spine    Muscle weakness    Lumbar spondylosis       Review of patient's allergies indicates:   Allergen Reactions    Shellfish containing products Hives       Current Inpatient Medications:      No current facility-administered medications on file prior to encounter.     Current Outpatient Medications on File Prior to Encounter   Medication Sig Dispense Refill    atorvastatin (LIPITOR) 10 MG tablet TAKE 1 TABLET(10 MG) BY MOUTH EVERY DAY 90 tablet 2    multivitamin (ONE DAILY MULTIVITAMIN) per tablet Take 1 tablet by mouth once daily.      sildenafiL (VIAGRA) 100 MG tablet Take 1 tablet (100 mg total) by mouth daily as needed for Erectile Dysfunction. 30 tablet 2       Past Surgical History:   Procedure Laterality Date    COLONOSCOPY N/A 4/11/2018    Procedure: COLONOSCOPY golytely;  Surgeon: Deborah Gamino MD;  Location: [facility] ENDO;  Service: Endoscopy;  Laterality: N/A;    KNEE ARTHROSCOPY W/ ACL RECONSTRUCTION      left    KNEE ARTHROSCOPY W/ MENISCAL REPAIR  1998    left    TRANSFORAMINAL EPIDURAL INJECTION OF STEROID Right 10/21/2022    Procedure: LUMBAR TRANSFORAMINAL RIGHT L5/S1 CONTRAST DIRECT REFERRAL;  Surgeon: Simone Prather MD;  Location: [facility] PAIN [facility]GT;  Service: Pain Management;  Laterality: Right;       Social History     Socioeconomic  History    Marital status:     Number of children: 3   Occupational History    Occupation: Operations   Tobacco Use    Smoking status: Never    Smokeless tobacco: Never   Substance and Sexual Activity    Alcohol use: No    Drug use: No    Sexual activity: Yes     Partners: Female       OBJECTIVE:     Vital Signs Range (Last 24H):  Temp:  [37.1 °C (98.8 °F)]   Pulse:  [58]   Resp:  [17]   BP: (146)/(86)   SpO2:  [98 %]       Significant Labs:  Lab Results   Component Value Date    WBC 4.18 01/25/2023    HGB 14.6 01/25/2023    HCT 45.7 01/25/2023     01/25/2023    CHOL 204 (H) 01/25/2023    TRIG 75 01/25/2023    HDL 50 01/25/2023    ALT 15 01/25/2023    AST 10 01/25/2023     01/25/2023    K 4.0 01/25/2023     01/25/2023    CREATININE 1.1 01/25/2023    BUN 12 01/25/2023    CO2 29 01/25/2023    TSH 0.616 07/27/2022    PSA 0.73 01/25/2023    INR 1.2 01/25/2023    HGBA1C 5.3 07/27/2022         Diagnostic Studies:      Cardiac Studies:  EKG:   Vent. Rate : 063 BPM     Atrial Rate : 063 BPM      P-R Int : 148 ms          QRS Dur : 084 ms       QT Int : 402 ms       P-R-T Axes : 041 104 017 degrees      QTc Int : 411 ms     Normal sinus rhythm   Rightward axis   Borderline Abnormal ECG   When compared with ECG of 04-JUN-2018 09:43,   No significant change was found   Confirmed by Wayne Miranda MD ([id number]) on 1/27/2023 9:53:14 AM     Referred By: JESSIKA VAZ           Confirmed By:Wayne Miranda MD        ASSESSMENT/PLAN:       Pre-op Assessment          Review of Systems  Anesthesia Hx:  No problems with previous Anesthesia  Denies Family Hx of Anesthesia complications.   Denies Personal Hx of Anesthesia complications.   Social:  Non-Smoker, No Alcohol Use    Hematology/Oncology:  Hematology Normal   Oncology Normal     EENT/Dental:EENT/Dental Normal   Cardiovascular:    Denies Angina.  Functional Capacity 4 METS, ABLE TO CLIMB 2 FLIGHTS OF STAIRS    Pulmonary:   Denies Shortness of  breath.  Denies Recent URI.    Renal/:  Renal/ Normal     Hepatic/GI:  Hepatic/GI Normal    Musculoskeletal:  Musculoskeletal General/Symptoms: Functional capacity is ambulatory without assistance.  Joint Disease:  Arthritis OF BACK PER PATIENT Lumbar Spine Disorders HERNIATED NUCLEUS PULPOSUS-LUMBAR  Neurological:   H/O TRIGEMINAL NEURALGIA , CERVICAL RADICULOPATHY PER EPIC Neuro Symptoms of pain OF LOWER BACK AND RLEDx of Headaches   Endocrine:  Metabolic Disorders, Obesity / BMI > 30  Psych:  Psychiatric Normal           Physical Exam  General: Well nourished, Cooperative, Alert and Oriented    Airway:  Mallampati: II   Mouth Opening: Normal  TM Distance: Normal  Tongue: Normal  Neck ROM: Normal ROM    Dental:  Intact    Heart:  Rate: Normal  Rhythm: Regular Rhythm        Anesthesia Plan  Type of Anesthesia, risks & benefits discussed:    Anesthesia Type: Gen ETT  Intra-op Monitoring Plan: Standard ASA Monitors  Post Op Pain Control Plan: multimodal analgesia and IV/PO Opioids PRN  Induction:  IV  Airway Plan: Direct and Video, Post-Induction  Informed Consent: Informed consent signed with the Patient and all parties understand the risks and agree with anesthesia plan.  All questions answered. Patient consented to blood products? Yes  ASA Score: 2  Day of Surgery Review of History & Physical: H&P Update referred to the surgeon/provider.    Ready For Surgery From Anesthesia Perspective.     .

## 2023-03-27 NOTE — PLAN OF CARE
Pre op complete. Waiting on blood consent, anesthesia consent, site bertha and full H&P update. Will get pt's daughter to come to bedside; she will hold pt's phone. Pt's clothing in locker. Pt resting comfortably with all questions addressed at this time; call light in reach.

## 2023-03-27 NOTE — ANESTHESIA POSTPROCEDURE EVALUATION
Anesthesia Post Evaluation    Patient: Daniel Garcias    Procedure(s) Performed: Procedure(s) (LRB):  RIGHT L5-S1 MICRODISCECTOMY,MINIMALLY INVASIVE APPROACH (N/A)    Final Anesthesia Type: general      Patient location during evaluation: PACU  Patient participation: Yes- Able to Participate  Level of consciousness: awake and alert and oriented  Post-procedure vital signs: reviewed and stable  Pain management: adequate  Airway patency: patent  VIRA mitigation strategies: Multimodal analgesia  PONV status at discharge: No PONV  Anesthetic complications: no      Cardiovascular status: blood pressure returned to baseline and hemodynamically stable  Respiratory status: unassisted, spontaneous ventilation and room air  Hydration status: euvolemic  Follow-up not needed.          Vitals Value Taken Time   /102 03/27/23 1632   Temp 36.7 °C (98 °F) 03/27/23 1537   Pulse 50 03/27/23 1632   Resp 8 03/27/23 1632   SpO2 100 % 03/27/23 1632   Vitals shown include unvalidated device data.      Event Time   Out of Recovery 16:00:00         Pain/Wendy Score: Pain Rating Prior to Med Admin: 5 (3/27/2023 11:42 AM)  Wendy Score: 10 (3/27/2023  4:00 PM)

## 2023-03-27 NOTE — PLAN OF CARE
Patient is AAO and VSS. Tolerating PO and states pain is tolerable. Back dressing CDI.  Patient states they are ready for d/c. IV removed. Catheter tip intact. Spouse at bedside. Discharge instructions reviewed and copy given to the patient and spouse. Questions answered. Both verbalized understanding. Rx e-scribed to the patient's pharmacy. Patient wheeled to car by Jaymie QUESADA

## 2023-03-27 NOTE — PROGRESS NOTES
Certification of Assistant at Surgery       Surgery Date: 3/27/2023     Participating Surgeons:  Surgeon(s) and Role:     * Saad Gordon MD - Primary    Procedures:  Procedure(s) (LRB):  RIGHT L5-S1 MICRODISCECTOMY,MINIMALLY INVASIVE APPROACH (N/A)    Assistant Surgeon's Certification of Necessity:  I understand that section 1842 (b) (6) (d) of the Social Security Act generally prohibits Medicare Part B reasonable charge payment for the services of assistants at surgery in teaching hospitals when qualified residents are available to furnish such services. I certify that the services for which payment is claimed were medically necessary, and that no qualified resident was available to perform the services. I further understand that these services are subject to post-payment review by the Medicare carrier.      Sharon Moe PA-C    03/27/2023  3:17 PM

## 2023-03-27 NOTE — PATIENT INSTRUCTIONS
Post op Spine Patient Instructions    Activity Restrictions:  [x]  Return to work will be determined on an individual basis.  [x]  No lifting greater than 5-10 pounds.  [x]  Avoid bending and twisting the area of your surgery more than 45 degrees from neutral position in any direction.  [x]  No driving or operating machinery:  [x]  until cleared by your surgeon.  [x]  while taking narcotic pain medications or muscle relaxants.  [x]  Increase ambulation over the next 2 weeks. Walk on paved surfaces only. It is okay to walk up and down stairs while holding onto a side rail.    Discharge Medication/Follow-up:  [x]  Please refer to discharge medication reconciliation form.  [x]  Do not take any OTC products containing acetaminophen (tylenol) at the same time as you take your narcotic pain medication. Medications that may contain acetaminophen include but are not limited to: Excedrin and other headache medications, arthritis medications, cold and sinus medications, etc. Please review the list of active ingredients in any OTC medication prior to taking it.  [x]  Do not take ANY Aspirin or Aspirin containing products for 2 weeks after surgery (unless otherwise directed in discharge medication list).   [x]  Do not take ANY herbal supplements for 2 weeks after surgery.    [x]  Do not take ANY non-steroidal anti-inflammatory drugs (NSAIDS), including the following: ibuprofen, naprosyn, Aleve, Advil, Indocin, Mobic, or Celebrex for 2 weeks after surgery.   [x]  Prescriptions for appropriate medication will be given upon discharge.  [x]  Take the pain medication as prescribed. We recommend to wean use of your pain medication after 1 week of taking as prescribed (Ex: After 1 week of taking every 4 hours as needed, wean down to taking your pain medication every 6 hours as needed).  [x]  Take docusate (Colace 100 mg): take one capsule a day as needed for constipation. You can get this over the counter.  [x]  Follow-up  appointment:  [x]  10-14 days post-op for wound check by physician assistant/nurse  [x]  4-6 weeks with MD:  [x]  with x-rays  [x]  An appointment will be mailed to you.    Wound Care:  [x]  No bandage required. Keep your incision open to the air. You have dermabond (skin glue) covering your incision. This will begin to flake off over the next 2 weeks. Do not remove this on your own, allow it to peel off. Do not apply ointments or creams to your incision.  [x]  You may shower on the 2nd day after your surgery. Keep the incision clean and dry at all times. Do not allow the force of water to hit the incision. If the incision gets damp, pat it dry. Do not rub or scrub the incision.  [x]  You cannot take a bath until 8 weeks after surgery.    Call your doctor or go to the Emergency Room for any signs of infection, including: increased redness, drainage, pain, or fever (temperature ?101). Call your doctor or go to the Emergency Room if there are any localized neurological changes; problems with speech, vision, numbness, tingling, weakness, or severe headache; inability to control urination or bowel movements; inability to urinate; or for other concerns.    Special Instructions:  [x]  No use of tobacco products.  [x]  Diet: Please eat a regular diet as tolerated.      Physicians need 3 days' notice for pain medicine to be refilled. Pain medicine will only be refilled between 8 AM and 5 PM, Monday through Friday, due to Food and Drug Administration regulation of documentation.    If you have any questions about this form, please call [phone].    Form No. 12973 (Revised 10/31/2013)

## 2023-03-27 NOTE — BRIEF OP NOTE
[place] - Surgery ([facility])  Brief Operative Note    Surgery Date: 3/27/2023     Surgeon(s) and Role:     * Saad Gordon MD - Primary    Assisting Surgeon: Sharon Moe PA-C    Pre-op Diagnosis:  Herniated nucleus pulposus, lumbar [M51.26]    Post-op Diagnosis:  Post-Op Diagnosis Codes:     * Herniated nucleus pulposus, lumbar [M51.26]    Procedure(s) (LRB):  RIGHT L5-S1 MICRODISCECTOMY,MINIMALLY INVASIVE APPROACH (N/A)    Anesthesia: General    Operative Findings: right L5-S1 laminectomy     Estimated Blood Loss: * No values recorded between 3/27/2023  1:55 PM and 3/27/2023  3:16 PM *         Specimens:   Specimen (24h ago, onward)      None              Discharge Note    OUTCOME: Patient tolerated treatment/procedure well without complication and is now ready for discharge.    DISPOSITION: Home or Self Care    FINAL DIAGNOSIS:  <principal problem not specified>    FOLLOWUP: In clinic    DISCHARGE INSTRUCTIONS:  No discharge procedures on file.

## 2023-03-27 NOTE — H&P
[facility] ([facility])  Neuorsurgery  History and Physical     Patient Name: Daniel Garcias  MRN: [id number]  Admission Date: 3/27/2023  Attending Physician: Saad Gordon MD   Primary Care Physician: Donte Maddox MD    Patient information was obtained from patient and past medical records.     Subjective:     Chief Complaint/Reason for Admission: lumbar radiculopathy     History of Present Illness:  Daniel Garcias is 47 y.o. male without significant PMHx who referred to clinic by  with RLE radicular pain. Reports right leg numbness on his thigh with radiation to his toe occasionally. He has been having right lower extremity L5 and S1 for the foot pain for the past 4 months.  The pain is progressive and every day.  Pain on average 5/10.  It gets 8 when severe and 3 when it is mild. Pain increased with standing and sitting for prolonged period of time.  Current episode is persistent and impacting mobility. No specific aggravating factors. He denies any leg weakness.  He denies any bowel bladder dysfunction. He denies any significant back pain.  He has not tried any conservative management at this stage.   Interval History 1/12/23 :  The patient is being seen in clinic today to follow-up on his symptoms since obtaining additional conservative treatment. He has tried medications :NSAIDs, acetaminophen, diclofenac gel, and steroids without relief. He recently underwent an SEAN on 10/21/22 without any relief. He continues to obtain PT and home exercises and has not noticed any improvement.  He has also noticed that his leg felt weak after getting out of the car. He has tried numerous conservative measures to treat his pain without relief.     Interval Hx 3/27/2023:  Denies significant changes since last visit. C/o RLE pain. Denies use of antiplatelet or anticoagulants in the last 7 days.     Review of Systems:  Constitutional: no fever or chills  Respiratory: no cough or shortness of breath    Cardiovascular: no chest pain or palpitations   Gastrointestinal: no nausea or vomiting   Genitourinary: no dysuria   Neurological: no seizures     Physical Exam:  General: well developed, well nourished, no distress.   Head: normocephalic, atraumatic  Neck: No tracheal deviation. No palpable masses. Full ROM.   Neurologic: Alert and oriented. Thought content appropriate.  GCS: Motor: 6/Verbal: 5/Eyes: 4 GCS Total: 15  Mental Status: Awake, Alert, Oriented x 4  Language: No aphasia  Speech: No dysarthria  Cranial nerves: face symmetric, tongue midline, CN II-XII grossly intact.   Eyes: pupils equal, round, reactive to light with accomodation, EOMI. Unable to appreciate optic disc. Red reflex intact bilaterally.   Ears: No drainage.   Pulmonary: normal respirations, no signs of respiratory distress  Abdomen: soft, non-distended, not tender to palpation  Sensory: intact to light touch throughout  Motor Strength: Moves all extremities spontaneously with good tone. No abnormal movements seen.     Strength  Deltoids Triceps Biceps Wrist Extension Wrist Flexion Hand    Upper: R 5/5 5/5 5/5 5/5 5/5 5/5    L 5/5 5/5 5/5 5/5 5/5 5/5     Iliopsoas Quadriceps Knee  Flexion Tibialis  anterior Gastro- cnemius EHL   Lower: R 5/5 5/5 5/5 5/5 5/5 3/5    L 5/5 5/5 5/5 5/5 5/5 5/5     Vascular: No LE edema.   Skin: Skin is warm, dry and intact.      Assessment and Plan:   46 y/o male with months of right L5-S1 radiculopathy refractory to multimodal conservative therapy. Recommend a right L5-S1 laminectomy/discectomy.     - Proceed to surgery       Farida Roa PA-C  Neurosurgery  [place] - Surgery ([facility])

## 2023-03-28 VITALS
WEIGHT: 275 LBS | BODY MASS INDEX: 33.49 KG/M2 | OXYGEN SATURATION: 99 % | SYSTOLIC BLOOD PRESSURE: 152 MMHG | TEMPERATURE: 98 F | RESPIRATION RATE: 15 BRPM | HEART RATE: 50 BPM | HEIGHT: 76 IN | DIASTOLIC BLOOD PRESSURE: 85 MMHG

## 2023-03-29 NOTE — OP NOTE
DATE OF SURGERY: 3/27/23    PREOPERATIVE DIAGNOSIS:  1. L5-S1 central and foraminal stenosis  2. Right lumbosacral radiculopathy    POSTOPERATIVE DIAGNOSIS:  Same    PROCEDURE PERFORMED:  1. L5-S1 laminectomy and bilateral foraminotomies from MIS approach  2. Use of intraoperative microscope  3. Use of intraoperative flouroscopy  4. Use of neuromonitoring with free run EMG    SURGEON: Saad Gordon M.D.    ASSISTANT: Sharon BEGUM -- a qualified resident was unavailable    ANESTHESIA: GETA    ESTIMATED BLOOD LOSS: Minimal    COMPLICATIONS: None    DRAINS: None    SPECIMENS SENT: None    FINDINGS: none    INDICATIONS:    47M with right lumbosacral radiculopathy in the setting of lumbosacral stenosis which failed conservative measures. I recommended the above procedure. Risks, benefits, alternatives, indications and methods were reviewed in detail and the patient wished to proceed. All questions were answered. No guarantees about the results of the procedure were made.    PROCEDURE:    The patient was brought to the operating room where he was intubated and placed under general anesthesia without difficulty.  All lines were placed. He was placed prone onto a [place] frame with appropriate padding of all pressure points.  AP and lateral x-ray were used to localize to the L5-S1 disc space and to plan a right paramedian incision. The skin was marked and the area was prepped and draped in the usual sterile fashion. A timeout was performed prior to the procedure. Ten mL of Lidocaine with Epinephrine was injected into the skin.     A linear paramedian incision was made with a 10 blade.  Bovie electric cautery was used to open the fascia. Sequential dilators were docked onto the right L5 hemilamina.  A tubular retractor was placed and docked onto the operative table.  The microscope was brought into the field for microdissection.  Soft tissues were cleared with Bovie electrocautery to expose the L5 hemilamina and the  medial L5-S1 facet. Using the drill and rongeurs an L5-S1 laminectomy and bilateral foraminotomies were performed in standard fashion. Adequate decompression of the thecal sac, bilateral foramina, and bilateral traversing S1 nerve roots was confirmed with the [place] probe.      The wound was copiously irrigated with sterile normal saline and a dilute Betadyne solution.  Depo-Medrol was placed over the thecal sac.  The microscope was taken out of the field.  Hemostasis was achieved with bipolar electrocautery.  1 g of vancomycin powder was placed into the wound. The fascia was closed in a watertight fashion with a running UR6 needle.  The soft tissues were closed in layers.  A 4-0 Monocryl subcuticular stitch was placed.  Dermabond was placed at the skin.    The patient appeared to tolerate the procedure well from a hemodynamic and neuro monitoring standpoint.  I was present for all critical portions of the case.  At the end of the case all counts are correct.  The patient was repositioned supine onto the hospital bed where he was extubated and allowed to emerge from anesthesia without difficulty.

## 2023-04-12 ENCOUNTER — CLINICAL SUPPORT (OUTPATIENT)
Dept: NEUROSURGERY | Facility: CLINIC | Age: 48
End: 2023-04-12
Payer: COMMERCIAL

## 2023-04-12 VITALS — HEART RATE: 75 BPM | DIASTOLIC BLOOD PRESSURE: 83 MMHG | SYSTOLIC BLOOD PRESSURE: 137 MMHG

## 2023-04-12 PROCEDURE — 99999 PR PBB SHADOW E&M-EST. PATIENT-LVL III: ICD-10-PCS | Mod: PBBFAC,,,

## 2023-04-12 PROCEDURE — 99999 PR PBB SHADOW E&M-EST. PATIENT-LVL III: CPT | Mod: PBBFAC,,,

## 2023-04-12 NOTE — PROGRESS NOTES
Daniel Garcias is a 47 y.o. male here for 2 week post op wound check.     Surgery: R. L5-s1 microdiscectomy     Symptoms: leg pain below knee/calf/feet     DME:none    Brace: none    Pain: 4    Medication Refills: none       Incision with staples and sutures  RAFA, well approximated, no redness, swelling or purulent drainage.staples and sutures removed. Instructed patient to keep incision RAFA, no lotions, creams or bandages. OK to shower without water pressure to area. PostOP written instructions given to patient.     Patient verbalizes understanding. Patient to followup with Dr. Gordon for 6 week followup with/without imaging.           Future Appointments   Date Time Provider Department Center   5/11/2023  3:00 PM Saad Gordon MD [facility] [id number] [facility]   1/25/2024 10:40 AM Donte Izquierdo MD [facility] IM [facility] W

## 2023-05-04 ENCOUNTER — PATIENT MESSAGE (OUTPATIENT)
Dept: NEUROSURGERY | Facility: CLINIC | Age: 48
End: 2023-05-04
Payer: COMMERCIAL

## 2023-05-09 ENCOUNTER — OFFICE VISIT (OUTPATIENT)
Dept: NEUROSURGERY | Facility: CLINIC | Age: 48
End: 2023-05-09
Payer: COMMERCIAL

## 2023-05-09 VITALS
HEART RATE: 99 BPM | SYSTOLIC BLOOD PRESSURE: 128 MMHG | DIASTOLIC BLOOD PRESSURE: 81 MMHG | HEIGHT: 76 IN | BODY MASS INDEX: 35.35 KG/M2 | WEIGHT: 290.25 LBS

## 2023-05-09 DIAGNOSIS — Z98.890 S/P LUMBAR MICRODISCECTOMY: Primary | ICD-10-CM

## 2023-05-09 PROCEDURE — 99999 PR PBB SHADOW E&M-EST. PATIENT-LVL III: CPT | Mod: PBBFAC,,, | Performed by: NURSE PRACTITIONER

## 2023-05-09 PROCEDURE — 99024 PR POST-OP FOLLOW-UP VISIT: ICD-10-PCS | Mod: S$GLB,,, | Performed by: NURSE PRACTITIONER

## 2023-05-09 PROCEDURE — 99999 PR PBB SHADOW E&M-EST. PATIENT-LVL III: ICD-10-PCS | Mod: PBBFAC,,, | Performed by: NURSE PRACTITIONER

## 2023-05-09 PROCEDURE — 99024 POSTOP FOLLOW-UP VISIT: CPT | Mod: S$GLB,,, | Performed by: NURSE PRACTITIONER

## 2023-05-09 RX ORDER — PREGABALIN 75 MG/1
75 CAPSULE ORAL 2 TIMES DAILY
Qty: 60 CAPSULE | Refills: 0 | Status: SHIPPED | OUTPATIENT
Start: 2023-05-09 | End: 2024-03-04 | Stop reason: DRUGHIGH

## 2023-05-09 NOTE — PROGRESS NOTES
Neurosurgery  Established Patient    SUBJECTIVE:     History of Present Illness: Daniel Garcias is a 47 y.o. male s/p RIGHT L5/S1 microdiscectomy MIS with Dr. Gordon on 3/27/23. He is being seen in clinic today for his 6 week post-op evaluation. States that he is doing well since surgery. He noticed significant improvement in his back pain. Rates his pain as a 0/10. He continues to struggle with right leg pain and tingling. Denies any bowel bladder dysfunction, leg weakness, gait instability, or saddle anesthesia.     Review of patient's allergies indicates:   Allergen Reactions    Shellfish containing products Hives       Current Outpatient Medications   Medication Sig Dispense Refill    atorvastatin (LIPITOR) 10 MG tablet TAKE 1 TABLET(10 MG) BY MOUTH EVERY DAY 90 tablet 2    HYDROcodone-acetaminophen (NORCO)  mg per tablet Take 1 tablet by mouth every 4 (four) hours as needed for Pain. 42 tablet 0    multivitamin (THERAGRAN) per tablet Take 1 tablet by mouth once daily.      sildenafiL (VIAGRA) 100 MG tablet Take 1 tablet (100 mg total) by mouth daily as needed for Erectile Dysfunction. 30 tablet 2    pregabalin (LYRICA) 75 MG capsule Take 1 capsule (75 mg total) by mouth 2 (two) times daily. 60 capsule 0     No current facility-administered medications for this visit.       Past Medical History:   Diagnosis Date    Cervical radiculopathy 10/29/2019    Cervical strain 4/20/2021    History of repair of ACL - 1994, meniscus 2007 6/24/2019 1994, meniscus 2007    Intractable episodic headache 4/22/2019    Sciatica of right side 4/23/2019     Past Surgical History:   Procedure Laterality Date    COLONOSCOPY N/A 4/11/2018    Procedure: COLONOSCOPY golytely;  Surgeon: Deborah Gamino MD;  Location: [facility];  Service: Endoscopy;  Laterality: N/A;    KNEE ARTHROSCOPY W/ ACL RECONSTRUCTION      left    KNEE ARTHROSCOPY W/ MENISCAL REPAIR  1998    left    MINIMALLY INVASIVE SURGICAL REMOVAL OF  "INTERVERTEBRAL DISC OF SPINE USING MICROSCOPE N/A 3/27/2023    Procedure: RIGHT L5-S1 MICRODISCECTOMY,MINIMALLY INVASIVE APPROACH;  Surgeon: Saad Gordon MD;  Location: [facility] OR;  Service: Neurosurgery;  Laterality: N/A;  RIGHT L5-S1 MICRODISCECTOMY,MINIMALLY INVASIVE APPROACH   ANESTHESIA: GENERAL   NERVE MON; EMG, SEP  POSITION: PRONE  BED: [place] 4 POST/ KATE FRAME  HEAD REST: PRONE VIEW  RADIOLOGY: C-ARM  MISC: MICROSCOPE, CARO    TRANSFORAMINAL EPIDURAL INJECTION OF STEROID Right 10/21/2022    Procedure: LUMBAR TRANSFORAMINAL RIGHT L5/S1 CONTRAST DIRECT REFERRAL;  Surgeon: Simone Prather MD;  [facility];  Service: Pain Management;  Laterality: Right;     Family History       Problem Relation (Age of Onset)    Cancer Maternal Grandmother    Hyperlipidemia Father    Hypertension Mother          Social History     Socioeconomic History    Marital status:     Number of children: 3   Occupational History    Occupation: Operations   Tobacco Use    Smoking status: Never    Smokeless tobacco: Never   Substance and Sexual Activity    Alcohol use: No    Drug use: No    Sexual activity: Yes     Partners: Female       Review of Systems   Constitutional:  Negative for activity change, appetite change and fever.   HENT:  Negative for hearing loss and postnasal drip.    Eyes:  Negative for pain and visual disturbance.   Respiratory:  Negative for shortness of breath.    Cardiovascular:  Negative for chest pain and palpitations.   Gastrointestinal:  Negative for abdominal pain.   Endocrine: Negative for polydipsia, polyphagia and polyuria.   Musculoskeletal:  Negative for back pain and gait problem.   Neurological:  Positive for numbness. Negative for dizziness, weakness and headaches.   Psychiatric/Behavioral:  Negative for confusion and dysphoric mood.      OBJECTIVE:     Vital Signs  Pulse: 99  BP: 128/81  Pain Score: 0-No pain  Height: 6' 4" (193 cm)  Weight: 131.7 kg (290 lb 3.8 oz)  Body mass " index is 35.33 kg/m².    Neurosurgery Physical Exam  General: well developed, well nourished, no distress.   Head: normocephalic, atraumatic  Neurologic: Alert and oriented. Thought content appropriate.  GCS: Motor: 6/Verbal: 5/Eyes: 4 GCS Total: 15  Mental Status: Awake, Alert, Oriented x 4  Language: No aphasia  Speech: No dysarthria  Cranial nerves: face symmetric, tongue midline, CN II-XII grossly intact.   Eyes: pupils equal, round, reactive to light with accomodation, EOMI.   Pulmonary: normal respirations, no signs of respiratory distress  Abdomen: soft, non-distended, not tender to palpation  Skin: Skin is warm, dry and intact. Incision well-healed  Sensory: intact to light touch throughout  Motor Strength:Moves all extremities spontaneously with good tone.  Full strength upper and lower extremities. No abnormal movements seen.     Diagnostic Results:  There is no new imaging to review for this encounter.      ASSESSMENT/PLAN:   Daniel Garcias is a 47 y.o. male s/p RIGHT L5/S1 microdiscectomy MIS with Dr. Gordon on 3/27/23. He is being seen in clinic today for his 6 week post-op evaluation. States that he is doing well since surgery. He noticed significant improvement in his back pain, but continues to struggle with right leg paraesthesias. I recommended Lyrica to assist with this concern. His restrictions have been released.    I would like the patient to follow-up in clinic as needed. I have encouraged him to contact the clinic with any questions, concerns, or adverse clinical changes. He verbalized understanding.      PAOLA Owens  Neurosurgery  [facility] [place].      Note dictated with voice recognition software, please excuse any grammatical errors.

## 2023-09-04 DIAGNOSIS — E78.5 HYPERLIPIDEMIA, UNSPECIFIED HYPERLIPIDEMIA TYPE: ICD-10-CM

## 2023-09-04 RX ORDER — ATORVASTATIN CALCIUM 10 MG/1
TABLET, FILM COATED ORAL
Qty: 90 TABLET | Refills: 1 | Status: SHIPPED | OUTPATIENT
Start: 2023-09-04 | End: 2024-03-15

## 2023-09-04 NOTE — TELEPHONE ENCOUNTER
Refill Decision Note   Daniel Garcias  is requesting a refill authorization.  Brief Assessment and Rationale for Refill:  Approve     Medication Therapy Plan:         Comments:     Note composed:8:48 AM 09/04/2023

## 2023-09-04 NOTE — TELEPHONE ENCOUNTER
No care due was identified.  [facility] Embedded Care Due Messages. Reference number: [id number].   9/04/2023 3:38:20 AM CDT

## 2023-12-29 ENCOUNTER — OFFICE VISIT (OUTPATIENT)
Dept: NEUROSURGERY | Facility: CLINIC | Age: 48
End: 2023-12-29
Payer: COMMERCIAL

## 2023-12-29 VITALS — HEART RATE: 65 BPM | DIASTOLIC BLOOD PRESSURE: 95 MMHG | SYSTOLIC BLOOD PRESSURE: 184 MMHG

## 2023-12-29 DIAGNOSIS — Z98.890 S/P LUMBAR MICRODISCECTOMY: ICD-10-CM

## 2023-12-29 DIAGNOSIS — M54.9 DORSALGIA, UNSPECIFIED: Primary | ICD-10-CM

## 2023-12-29 DIAGNOSIS — M54.17 LUMBOSACRAL RADICULOPATHY: ICD-10-CM

## 2023-12-29 DIAGNOSIS — R20.0 RIGHT LEG NUMBNESS: ICD-10-CM

## 2023-12-29 PROCEDURE — 99213 OFFICE O/P EST LOW 20 MIN: CPT | Mod: S$GLB,,, | Performed by: NURSE PRACTITIONER

## 2023-12-29 PROCEDURE — 99999 PR PBB SHADOW E&M-EST. PATIENT-LVL III: CPT | Mod: PBBFAC,,, | Performed by: NURSE PRACTITIONER

## 2023-12-29 PROCEDURE — 99213 PR OFFICE/OUTPT VISIT, EST, LEVL III, 20-29 MIN: ICD-10-PCS | Mod: S$GLB,,, | Performed by: NURSE PRACTITIONER

## 2023-12-29 PROCEDURE — 99999 PR PBB SHADOW E&M-EST. PATIENT-LVL III: ICD-10-PCS | Mod: PBBFAC,,, | Performed by: NURSE PRACTITIONER

## 2023-12-29 NOTE — PROGRESS NOTES
Neurosurgery  Established Patient    SUBJECTIVE:     History of Present Illness: Daniel Garcias is a 47 y.o. male s/p RIGHT L5/S1 microdiscectomy MIS with Dr. Gordon on 3/27/23. He is being seen in clinic today for his 6 week post-op evaluation. States that he is doing well since surgery. He noticed significant improvement in his back pain. Rates his pain as a 0/10. He continues to struggle with right leg pain and tingling. Denies any bowel bladder dysfunction, leg weakness, gait instability, or saddle anesthesia.      Interval Hx 12/29/23: The patient is being seen in clinic today to discuss concerns with worsening numbness and pain in the right leg. States that previously the paraesthesias were more intermittent and have become more constant. Reports intermittent back pain. Denies new weakness, b/b dysfunction, saddle anesthesia, or gait instability. Denies left leg symptoms.      Review of patient's allergies indicates:   Allergen Reactions    Shellfish containing products Hives       Current Outpatient Medications   Medication Sig Dispense Refill    atorvastatin (LIPITOR) 10 MG tablet TAKE 1 TABLET(10 MG) BY MOUTH EVERY DAY 90 tablet 1    HYDROcodone-acetaminophen (NORCO)  mg per tablet Take 1 tablet by mouth every 4 (four) hours as needed for Pain. 42 tablet 0    multivitamin (THERAGRAN) per tablet Take 1 tablet by mouth once daily.      sildenafiL (VIAGRA) 100 MG tablet Take 1 tablet (100 mg total) by mouth daily as needed for Erectile Dysfunction. 30 tablet 2    pregabalin (LYRICA) 75 MG capsule Take 1 capsule (75 mg total) by mouth 2 (two) times daily. 60 capsule 0     No current facility-administered medications for this visit.       Past Medical History:   Diagnosis Date    Cervical radiculopathy 10/29/2019    Cervical strain 4/20/2021    History of repair of ACL - 1994, meniscus 2007 6/24/2019    1994, meniscus 2007    Intractable episodic headache 4/22/2019    Sciatica of right side 4/23/2019      Past Surgical History:   Procedure Laterality Date    COLONOSCOPY N/A 4/11/2018    Procedure: COLONOSCOPY golytely;  Surgeon: Deborah Gamino MD;  Location: [facility] ENDO;  Service: Endoscopy;  Laterality: N/A;    KNEE ARTHROSCOPY W/ ACL RECONSTRUCTION      left    KNEE ARTHROSCOPY W/ MENISCAL REPAIR  1998    left    MINIMALLY INVASIVE SURGICAL REMOVAL OF INTERVERTEBRAL DISC OF SPINE USING MICROSCOPE N/A 3/27/2023    Procedure: RIGHT L5-S1 MICRODISCECTOMY,MINIMALLY INVASIVE APPROACH;  Surgeon: Saad Gordon MD;  Location: [facility] OR;  Service: Neurosurgery;  Laterality: N/A;  RIGHT L5-S1 MICRODISCECTOMY,MINIMALLY INVASIVE APPROACH   ANESTHESIA: GENERAL   NERVE MON; EMG, SEP  POSITION: PRONE  BED: [place] [id number] POST/ KATE FRAME  HEAD REST: PRONE VIEW  RADIOLOGY: C-ARM  MISC: MICROSCOPE, CARO    TRANSFORAMINAL EPIDURAL INJECTION OF STEROID Right 10/21/2022    Procedure: LUMBAR TRANSFORAMINAL RIGHT L5/S1 CONTRAST DIRECT REFERRAL;  Surgeon: Simone Prather MD;  Location: [facility] PAIN MGT;  Service: Pain Management;  Laterality: Right;     Family History       Problem Relation (Age of Onset)    Cancer Maternal Grandmother    Hyperlipidemia Father    Hypertension Mother          Social History     Socioeconomic History    Marital status:     Number of children: 3   Occupational History    Occupation: Operations   Tobacco Use    Smoking status: Never    Smokeless tobacco: Never   Substance and Sexual Activity    Alcohol use: No    Drug use: No    Sexual activity: Yes     Partners: Female       Review of Systems    OBJECTIVE:     Vital Signs  Pulse: 65  BP: (!) 184/95  Pain Score: 0-No pain  There is no height or weight on file to calculate BMI.    Neurosurgery Physical Exam  General: well developed, well nourished, no distress.   Head: normocephalic, atraumatic  Neurologic: Alert and oriented. Thought content appropriate.  GCS: Motor: 6/Verbal: 5/Eyes: 4 GCS Total: 15  Mental Status: Awake, Alert, Oriented x  4  Language: No aphasia  Speech: No dysarthria  Cranial nerves: face symmetric, tongue midline, CN II-XII grossly intact.   Eyes: pupils equal, round, reactive to light with accomodation, EOMI.   Pulmonary: normal respirations, no signs of respiratory distress  Abdomen: soft, non-distended  Skin: Skin is warm, dry and intact.  Sensory: intact to light touch throughout  Motor Strength:Moves all extremities spontaneously with good tone.    Strength  Deltoids Triceps Biceps Wrist Extension Wrist Flexion Hand    Upper: R 5/5 5/5 5/5 5/5 5/5 5/5    L 5/5 5/5 5/5 5/5 5/5 5/5     HF KE KF DF PF EHL   Lower: R 5/5 5/5 5/5 5/5 5/5 5/5    L 5/5 5/5 5/5 5/5 5/5 5/5      Cerebellar:   Gait stable, fluid.   Tandem Gait: No difficulty  Able to walk on heels & toes     Cervical:   ROM: Full with flexion, extension, lateral rotation and ear-to-shoulder bend.   Midline TTP: Negative.     Thoracic:  Midline TTP: Negative.     Lumbar:  Midline TTP: Negative.  Straight Leg Test: Negative.      Diagnostic Results:  There is no new imaging to review for this encounter.        ASSESSMENT/PLAN:   Daniel Garcias is a 48 y.o. male  s/p RIGHT L5/S1 microdiscectomy MIS with Dr. Gordon on 3/27/23. He was seen in clinic today to discuss concerns with worsening numbness and pain in the right leg. States that previously the paraesthesias were more intermittent and have become more constant. I have ordered an updated MRI lumbar spine W WO to evaluate for scar tissue v. Disc re-herniation. EMG to evaluate the chronicity of the paraesthesias. Dynamic x-ray for instability.     I would like the patient to follow-up in clinic in 4 weeks to discuss the image findings. I have encouraged him to contact the clinic with any questions, concerns, or adverse clinical changes. He verbalized understanding.      PAOLA Owens  Neurosurgery  [facility] [place].      Note dictated with voice recognition software, please excuse any  grammatical errors.

## 2024-01-16 ENCOUNTER — HOSPITAL ENCOUNTER (OUTPATIENT)
Dept: RADIOLOGY | Facility: HOSPITAL | Age: 49
Discharge: HOME | End: 2024-01-16
Attending: NURSE PRACTITIONER
Payer: COMMERCIAL

## 2024-01-16 DIAGNOSIS — M54.17 LUMBOSACRAL RADICULOPATHY: ICD-10-CM

## 2024-01-16 DIAGNOSIS — Z98.890 S/P LUMBAR MICRODISCECTOMY: ICD-10-CM

## 2024-01-16 DIAGNOSIS — R20.0 RIGHT LEG NUMBNESS: ICD-10-CM

## 2024-01-16 DIAGNOSIS — M54.9 DORSALGIA, UNSPECIFIED: ICD-10-CM

## 2024-01-16 PROCEDURE — 72158 MRI LUMBAR SPINE W/O & W/DYE: CPT | Mod: TC

## 2024-01-16 PROCEDURE — 72158 MRI LUMBAR SPINE W/O & W/DYE: CPT | Mod: 26,,, | Performed by: RADIOLOGY

## 2024-01-16 PROCEDURE — 72114 X-RAY EXAM L-S SPINE BENDING: CPT | Mod: TC

## 2024-01-16 PROCEDURE — 25500020 PHARM REV CODE 255: Performed by: NURSE PRACTITIONER

## 2024-01-16 PROCEDURE — 72114 X-RAY EXAM L-S SPINE BENDING: CPT | Mod: 26,,, | Performed by: RADIOLOGY

## 2024-01-16 PROCEDURE — A9585 GADOBUTROL INJECTION: HCPCS | Performed by: NURSE PRACTITIONER

## 2024-01-16 RX ORDER — GADOBUTROL 604.72 MG/ML
10 INJECTION INTRAVENOUS
Status: COMPLETED | OUTPATIENT
Start: 2024-01-16 | End: 2024-01-16

## 2024-01-16 RX ADMIN — GADOBUTROL 10 ML: 604.72 INJECTION INTRAVENOUS at 07:01

## 2024-01-17 DIAGNOSIS — M54.17 LUMBOSACRAL RADICULOPATHY: Primary | ICD-10-CM

## 2024-01-22 ENCOUNTER — TELEPHONE (OUTPATIENT)
Dept: PAIN MEDICINE | Facility: CLINIC | Age: 49
End: 2024-01-22
Payer: COMMERCIAL

## 2024-01-22 NOTE — TELEPHONE ENCOUNTER
----- Message from Magdaleno James MD sent at 1/22/2024  3:11 PM CST -----  Please call this patient and let him know his appt tomorrow is to be seen by me, not for an injection.

## 2024-01-23 ENCOUNTER — OFFICE VISIT (OUTPATIENT)
Dept: PAIN MEDICINE | Facility: CLINIC | Age: 49
End: 2024-01-23
Payer: COMMERCIAL

## 2024-01-23 VITALS
HEIGHT: 76 IN | HEART RATE: 54 BPM | DIASTOLIC BLOOD PRESSURE: 98 MMHG | BODY MASS INDEX: 35.36 KG/M2 | WEIGHT: 290.38 LBS | SYSTOLIC BLOOD PRESSURE: 165 MMHG

## 2024-01-23 DIAGNOSIS — M54.17 LUMBOSACRAL RADICULOPATHY: ICD-10-CM

## 2024-01-23 DIAGNOSIS — M51.37 DDD (DEGENERATIVE DISC DISEASE), LUMBOSACRAL: Primary | ICD-10-CM

## 2024-01-23 PROCEDURE — 99214 OFFICE O/P EST MOD 30 MIN: CPT | Mod: S$GLB,,, | Performed by: EMERGENCY MEDICINE

## 2024-01-23 PROCEDURE — 99999 PR PBB SHADOW E&M-EST. PATIENT-LVL III: CPT | Mod: PBBFAC,,, | Performed by: EMERGENCY MEDICINE

## 2024-01-23 NOTE — H&P (VIEW-ONLY)
..This note was completed with dictation software and grammatical errors may exist.    PCP: Donte Izquierdo MD    Chief Complaint   Patient presents with    Leg Pain    Foot Pain        Original HISTORY OF PRESENT ILLNESS: Daniel Garcias is a 48 y.o. year old male patient who has a past medical history of Cervical radiculopathy, Cervical strain, History of repair of ACL - 1994, meniscus 2007, Intractable episodic headache, and Sciatica of right side. He presents in referral from Brooke Mcgowan for radicular pain    Original Pain Description:  The pain is located in the right leg and is radiating to the right foot / plantar surface. The pain is described as aching, numbing, and tingling. Exacerbating factors: Walking. Mitigating factors nothing. Symptoms interfere with daily activity. The patient feels like symptoms have been worsening. Patient reports loss of sensations.  Patient had a MICRODISCECTOMY L5/S1 3/23. Patient reports that the back pain resolved, but that the leg pain radicular symptoms persisted.     Original PAIN SCORES:  Best: Pain is 7  Current: Pain is 9  Worst: Pain is 9        1/23/2024     1:07 PM   Last 3 PDI Scores   Pain Disability Index (PDI) 42       6 weeks of Conservative therapy:  PT:  No  Chiro:  HEP: Not yet      Treatments / Medications: (Ice/Heat/NSAIDS/APAP/etc):  Lyrica 75 - not taking, did not want to take it  Norco - not taking    Antiplatelets/Anticoagulants:  No    Interventional Pain Procedures: (Previous injections)  10/21/22 Right L5/S1 TFESI  03/27/23 Right L5/S1 MICRODISCECTOMY     IMAGING:      MRI LUMBAR SPINE W WO CONTRAST 01/16/2024  L2-3: Circumferential disc bulge and bilateral facet arthropathy.  Mild spinal canal stenosis and mild bilateral neural foraminal narrowing.  L3-4: Circumferential disc bulge with a superimposed left foraminal/extraforaminal disc protrusion which may abut the exiting left L3 nerve root.  Bilateral facet arthropathy and ligamentum  flavum buckling.  Moderate spinal canal stenosis as well as severe left and moderate right neural foraminal narrowing.  L4-5: Circumferential disc bulge.  Bilateral facet arthropathy and ligamentum flavum buckling.  Mild spinal canal stenosis as well as moderate bilateral neural foraminal narrowing.  Small synovial cyst superimposed right L4-L5 level contributing to central encroachment  L5-S1: Postoperative change.  Broad-based posterior disc bulge.  Bilateral facet arthropathy.  Mild right neural foraminal narrowing.  No spinal canal stenosis.     Postoperative change of L5-S1 hemilaminectomy and bilateral foraminotomies.     Multilevel degenerative changes of the lumbar spine producing moderate spinal canal stenosis at L1-L2 and L3-L4.  Degenerative findings also contribute to severe left neural foraminal narrowing at L3-4. level by level details as per above.       Past Surgical History:   Procedure Laterality Date    COLONOSCOPY N/A 4/11/2018    Procedure: COLONOSCOPY golytely;  Surgeon: Deborah Gamino MD;  Location: [facility];  Service: Endoscopy;  Laterality: N/A;    KNEE ARTHROSCOPY W/ ACL RECONSTRUCTION      left    KNEE ARTHROSCOPY W/ MENISCAL REPAIR  1998    left    MINIMALLY INVASIVE SURGICAL REMOVAL OF INTERVERTEBRAL DISC OF SPINE USING MICROSCOPE N/A 3/27/2023    Procedure: RIGHT L5-S1 MICRODISCECTOMY,MINIMALLY INVASIVE APPROACH;  Surgeon: Saad Gordon MD;  Location: [facility];  Service: Neurosurgery;  Laterality: N/A;  RIGHT L5-S1 MICRODISCECTOMY,MINIMALLY INVASIVE APPROACH   ANESTHESIA: GENERAL   NERVE MON; EMG, SEP  POSITION: PRONE  BED: [place] [id number] POST/ KATE FRAME  HEAD REST: PRONE VIEW  RADIOLOGY: C-ARM  MISC: MICROSCOPE, CARO    TRANSFORAMINAL EPIDURAL INJECTION OF STEROID Right 10/21/2022    Procedure: LUMBAR TRANSFORAMINAL RIGHT L5/S1 CONTRAST DIRECT REFERRAL;  Surgeon: Simone Prather MD;  Location: [facility] PAIN MGT;  Service: Pain Management;  Laterality: Right;       Social History  "    Socioeconomic History    Marital status:     Number of children: 3   Occupational History    Occupation: Operations   Tobacco Use    Smoking status: Never    Smokeless tobacco: Never   Substance and Sexual Activity    Alcohol use: No    Drug use: No    Sexual activity: Yes     Partners: Female       Medications/Allergies: See med card    ROS:  GENERAL: No fever. No chills. No fatigue. Denies weight loss. Denies weight gain.  Back / musculoskeletal / neuro : See HPI    VITALS:   Vitals:    01/23/24 1309   BP: (!) 165/98   Pulse: (!) 54   Weight: 131.7 kg (290 lb 5.5 oz)   Height: 6' 4" (1.93 m)   PainSc:   7   PainLoc: Leg     Body mass index is 35.34 kg/m².      1/23/2024     1:07 PM   Last 3 PDI Scores   Pain Disability Index (PDI) 42       PHYSICAL EXAM:   GENERAL: Well appearing, in no acute distress, alert and oriented x3.  PSYCH:  Mood and affect appropriate.  SKIN: Skin color, texture, turgor normal, no rashes or lesions.  HEENT:  Normocephalic, atraumatic. Cranial nerves grossly intact.  NECK: No pain to palpation over the cervical paraspinous muscles. No pain to palpation over facets. No pain with neck flexion, extension, or lateral flexion.   PULM: No evidence of respiratory difficulty, symmetric chest rise.  GI:  Non-distended  BACK: Limited range of motion. Palpation over the lower lumbar spinous processes. There is no pain with palpation over the sacroiliac joints bilaterally.   EXTREMITIES: No deformities, edema, or skin discoloration.   MUSCULOSKELETAL: Shoulder, hip, and knee provocative maneuvers are negative. No atrophy is noted.  NEURO: Sensation is equal and appropriate bilaterally. Bilateral upper and lower extremity strength is normal and symmetric. Bilateral upper and lower extremity coordination and muscle stretch reflexes are physiologic and symmetric. Plantar response are downgoing. Straight leg raising in the supine position is negative to radicular pain.   GAIT: " normal.      LABS:    Lab Results   Component Value Date    HGBA1C 5.3 07/27/2022       Lab Results   Component Value Date    WBC 4.18 01/25/2023    HGB 14.6 01/25/2023    HCT 45.7 01/25/2023    MCV 88 01/25/2023     01/25/2023           ASSESSMENT: 48 y.o. year old male with pain, consistent with:    Encounter Diagnosis   Name Primary?    Lumbosacral radiculopathy        DISCUSSION: Daniel Garcias is a patient who comes to us with right radiculopathy      PLAN:  - I have stressed the importance of physical activity and a home exercise plan to help with pain and improve health.  - Patient can continue with medications for now since they are providing benefits, using them appropriately, and without side effects.  - Counseled patient regarding the importance of activity modification and constant sleeping habits.  - Interventions: Schedule for a right Transforaminal epidural steroid injection at Right L4/5 and L5/S1 to help with their pain and progress with a home exercise plan. Explained the risks and benefits of the procedure in detail with the patient today in clinic along with alternative treatment options, and the patient elected to pursue the intervention at this time.    - Anticoagulation use: No no anticoagulation  - If no benefit with above procedure, consider Schedule for a Right L4/5 cyst rupture to help with their pain and progress with a home exercise plan..   - Medications: Discussed with patient, but he would prefer to wait  - Imaging: Reviewed available imaging with patient and answered any questions they had regarding study.  - The patient's pathophysiology was explained in detail with reference to x-rays, models, other visual aids as appropriate.   - Follow up visit: return to clinic in 2 weeks post procedure  Cyst ruopture 1st then tfesti    I would like to thank Brooke Mcgowan, ALEXANDER for the opportunity to assist in the care of this patient. We had a very nice visit and I look forward to  continuing their care. Please let me know if I can be of further assistance.     Magdaleno James MD  01/23/2024

## 2024-01-23 NOTE — PROGRESS NOTES
..This note was completed with dictation software and grammatical errors may exist.    PCP: Donte Izquierdo MD    Chief Complaint   Patient presents with    Leg Pain    Foot Pain        Original HISTORY OF PRESENT ILLNESS: Daniel Garcias is a 48 y.o. year old male patient who has a past medical history of Cervical radiculopathy, Cervical strain, History of repair of ACL - 1994, meniscus 2007, Intractable episodic headache, and Sciatica of right side. He presents in referral from Brooke Mcgowan for radicular pain    Original Pain Description:  The pain is located in the right leg and is radiating to the right foot / plantar surface. The pain is described as aching, numbing, and tingling. Exacerbating factors: Walking. Mitigating factors nothing. Symptoms interfere with daily activity. The patient feels like symptoms have been worsening. Patient reports loss of sensations.  Patient had a MICRODISCECTOMY L5/S1 3/23. Patient reports that the back pain resolved, but that the leg pain radicular symptoms persisted.     Original PAIN SCORES:  Best: Pain is 7  Current: Pain is 9  Worst: Pain is 9        1/23/2024     1:07 PM   Last 3 PDI Scores   Pain Disability Index (PDI) 42       6 weeks of Conservative therapy:  PT:  No  Chiro:  HEP: Not yet      Treatments / Medications: (Ice/Heat/NSAIDS/APAP/etc):  Lyrica 75 - not taking, did not want to take it  Norco - not taking    Antiplatelets/Anticoagulants:  No    Interventional Pain Procedures: (Previous injections)  10/21/22 Right L5/S1 TFESI  03/27/23 Right L5/S1 MICRODISCECTOMY     IMAGING:      MRI LUMBAR SPINE W WO CONTRAST 01/16/2024  L2-3: Circumferential disc bulge and bilateral facet arthropathy.  Mild spinal canal stenosis and mild bilateral neural foraminal narrowing.  L3-4: Circumferential disc bulge with a superimposed left foraminal/extraforaminal disc protrusion which may abut the exiting left L3 nerve root.  Bilateral facet arthropathy and ligamentum  flavum buckling.  Moderate spinal canal stenosis as well as severe left and moderate right neural foraminal narrowing.  L4-5: Circumferential disc bulge.  Bilateral facet arthropathy and ligamentum flavum buckling.  Mild spinal canal stenosis as well as moderate bilateral neural foraminal narrowing.  Small synovial cyst superimposed right L4-L5 level contributing to central encroachment  L5-S1: Postoperative change.  Broad-based posterior disc bulge.  Bilateral facet arthropathy.  Mild right neural foraminal narrowing.  No spinal canal stenosis.     Postoperative change of L5-S1 hemilaminectomy and bilateral foraminotomies.     Multilevel degenerative changes of the lumbar spine producing moderate spinal canal stenosis at L1-L2 and L3-L4.  Degenerative findings also contribute to severe left neural foraminal narrowing at L3-4. level by level details as per above.       Past Surgical History:   Procedure Laterality Date    COLONOSCOPY N/A 4/11/2018    Procedure: COLONOSCOPY golytely;  Surgeon: Deborah Gamino MD;  Location: [facility];  Service: Endoscopy;  Laterality: N/A;    KNEE ARTHROSCOPY W/ ACL RECONSTRUCTION      left    KNEE ARTHROSCOPY W/ MENISCAL REPAIR  1998    left    MINIMALLY INVASIVE SURGICAL REMOVAL OF INTERVERTEBRAL DISC OF SPINE USING MICROSCOPE N/A 3/27/2023    Procedure: RIGHT L5-S1 MICRODISCECTOMY,MINIMALLY INVASIVE APPROACH;  Surgeon: Saad Gordon MD;  Location: [facility];  Service: Neurosurgery;  Laterality: N/A;  RIGHT L5-S1 MICRODISCECTOMY,MINIMALLY INVASIVE APPROACH   ANESTHESIA: GENERAL   NERVE MON; EMG, SEP  POSITION: PRONE  BED: [place] [id number] POST/ KATE FRAME  HEAD REST: PRONE VIEW  RADIOLOGY: C-ARM  MISC: MICROSCOPE, CARO    TRANSFORAMINAL EPIDURAL INJECTION OF STEROID Right 10/21/2022    Procedure: LUMBAR TRANSFORAMINAL RIGHT L5/S1 CONTRAST DIRECT REFERRAL;  Surgeon: Simone Prather MD;  Location: [facility] PAIN MGT;  Service: Pain Management;  Laterality: Right;       Social History  "    Socioeconomic History    Marital status:     Number of children: 3   Occupational History    Occupation: Operations   Tobacco Use    Smoking status: Never    Smokeless tobacco: Never   Substance and Sexual Activity    Alcohol use: No    Drug use: No    Sexual activity: Yes     Partners: Female       Medications/Allergies: See med card    ROS:  GENERAL: No fever. No chills. No fatigue. Denies weight loss. Denies weight gain.  Back / musculoskeletal / neuro : See HPI    VITALS:   Vitals:    01/23/24 1309   BP: (!) 165/98   Pulse: (!) 54   Weight: 131.7 kg (290 lb 5.5 oz)   Height: 6' 4" (1.93 m)   PainSc:   7   PainLoc: Leg     Body mass index is 35.34 kg/m².      1/23/2024     1:07 PM   Last 3 PDI Scores   Pain Disability Index (PDI) 42       PHYSICAL EXAM:   GENERAL: Well appearing, in no acute distress, alert and oriented x3.  PSYCH:  Mood and affect appropriate.  SKIN: Skin color, texture, turgor normal, no rashes or lesions.  HEENT:  Normocephalic, atraumatic. Cranial nerves grossly intact.  NECK: No pain to palpation over the cervical paraspinous muscles. No pain to palpation over facets. No pain with neck flexion, extension, or lateral flexion.   PULM: No evidence of respiratory difficulty, symmetric chest rise.  GI:  Non-distended  BACK: Limited range of motion. Palpation over the lower lumbar spinous processes. There is no pain with palpation over the sacroiliac joints bilaterally.   EXTREMITIES: No deformities, edema, or skin discoloration.   MUSCULOSKELETAL: Shoulder, hip, and knee provocative maneuvers are negative. No atrophy is noted.  NEURO: Sensation is equal and appropriate bilaterally. Bilateral upper and lower extremity strength is normal and symmetric. Bilateral upper and lower extremity coordination and muscle stretch reflexes are physiologic and symmetric. Plantar response are downgoing. Straight leg raising in the supine position is negative to radicular pain.   GAIT: " normal.      LABS:    Lab Results   Component Value Date    HGBA1C 5.3 07/27/2022       Lab Results   Component Value Date    WBC 4.18 01/25/2023    HGB 14.6 01/25/2023    HCT 45.7 01/25/2023    MCV 88 01/25/2023     01/25/2023           ASSESSMENT: 48 y.o. year old male with pain, consistent with:    Encounter Diagnosis   Name Primary?    Lumbosacral radiculopathy        DISCUSSION: Daniel Garcias is a patient who comes to us with right radiculopathy      PLAN:  - I have stressed the importance of physical activity and a home exercise plan to help with pain and improve health.  - Patient can continue with medications for now since they are providing benefits, using them appropriately, and without side effects.  - Counseled patient regarding the importance of activity modification and constant sleeping habits.  - Interventions: Schedule for a right Transforaminal epidural steroid injection at Right L4/5 and L5/S1 to help with their pain and progress with a home exercise plan. Explained the risks and benefits of the procedure in detail with the patient today in clinic along with alternative treatment options, and the patient elected to pursue the intervention at this time.    - Anticoagulation use: No no anticoagulation  - If no benefit with above procedure, consider Schedule for a Right L4/5 cyst rupture to help with their pain and progress with a home exercise plan..   - Medications: Discussed with patient, but he would prefer to wait  - Imaging: Reviewed available imaging with patient and answered any questions they had regarding study.  - The patient's pathophysiology was explained in detail with reference to x-rays, models, other visual aids as appropriate.   - Follow up visit: return to clinic in 2 weeks post procedure  Cyst ruopture 1st then tfesti    I would like to thank Brooke Mcgowan, ALEXANDER for the opportunity to assist in the care of this patient. We had a very nice visit and I look forward to  continuing their care. Please let me know if I can be of further assistance.     Magdaleno James MD  01/23/2024      Strong peripheral pulses

## 2024-01-24 ENCOUNTER — TELEPHONE (OUTPATIENT)
Dept: PAIN MEDICINE | Facility: CLINIC | Age: 49
End: 2024-01-24
Payer: COMMERCIAL

## 2024-01-24 DIAGNOSIS — M54.17 LUMBOSACRAL RADICULOPATHY: Primary | ICD-10-CM

## 2024-01-24 NOTE — TELEPHONE ENCOUNTER
----- Message from Magdaleno James MD sent at 2024  8:55 AM CST -----  Regarding: Order for BLOSSOM ALCANTARA    Patient Name: BLOSSOM ALCANTARA([id number])  Sex: Male  : 1975      PCP: JESSIKA VAZ    Center: [facility]     Level of Service:62423     ME OFFICE/OUTPT VISIT, EST, LEVL IV, 30-39 MIN    Types of orders made on 2024: Procedure Request    Order Date:2024  Ordering User:HOLLI JAMES [[id number]]  Encounter Provider:Magdaleno James MD [[id number]]  Authorizing Provider: Magdaleno James MD [[id number]]  Department:[facility]GEMENT[[id number]]    Common Order Information  Procedure -> Transforaminal Injection (Specify level and laterality) Cmt: Right             L4/5 and L5/S1    Order Specific Information  Order: Procedure Order to Pain Management [Custom: [id number]]  Order #:          [id number]:   1 FUTURE    Priority: Routine  Class: Clinic Performed    Future Order Information      Expires on:2025            Expected by:2024                   Associated Diagnoses      M54.17 Lumbosacral radiculopathy      M51.37 DDD (degenerative disc disease), lumbosacral      Physician -> rasheeda         Facility Name: -> [place] [facility]           Priority: Routine  Class: Clinic Performed      UFuture Order Information      Expires on:2025            Expected by:2024                   Associated Diagnoses      M54.17 Lumbosacral radiculopathy      M51.37 DDD (degenerative disc disease), lumbosacral      Procedure -> Transforaminal Injection (Specify level and laterality) Cmt:                 Right L4/5 and L5/S1        Physician -> rasheeda         Facility Name: -> [facility]

## 2024-02-08 ENCOUNTER — TELEPHONE (OUTPATIENT)
Dept: PAIN MEDICINE | Facility: CLINIC | Age: 49
End: 2024-02-08
Payer: COMMERCIAL

## 2024-02-14 NOTE — PRE-PROCEDURE INSTRUCTIONS
Patient reviewed on 2/14/2024.  Okay to proceed at [facility]. The following pre-procedure instructions and arrival time have been reviewed with patient via phone and sent to patient portal for review.  Patient verbalized an understanding.  Pt to be accompanied by his daughter day of procedure as responsible .     Dear Daniel,     You are scheduled for a procedure with Dr. James on 02/15/2024  Your scheduled arrival time is 10:20 am.  This arrival time is roughly 1 hour before your anticipated procedure time to allow sufficient time for pre-op..  Please wear comfortable clothes.  Most patients do not need to change into a gown.  Please do not wear a dress.  This procedure will take place at the [facility] at the corner of [facility] and [facility].  It is in the [facility] next to [facility].  The address is:     [address] [place] [address].  [place]mer [id number]     After entering the building, you will proceed to the second floor where you can check in with registration. You should take any medications that you routinely take for blood pressure, heart medications, thyroid, cholesterol, etc.      The fasting restrictions are dependent on whether or not you are receiving sedation.  Sedation is not available for all procedures.      Your fasting instructions are as follow:  IV sedation. You should not eat for 8 hours and can only drink clear liquids (water or black coffee without cream/sugar) up until 2 hours before your scheduled time.  You CANNOT drive yourself and must have a .     If you are on blood thinners, you need to follow the anticoagulation instructions that had been discussed previously.  You should only stop the blood thinners if it was approved by your primary care physician or your cardiologist.  In the event that you are not able to stop your blood thinners, a blood thinner was not listed on your medication list, or we were not able to get clearance from  your cardiologist, then the procedure may have to be postponed/canceled.      IF you were told to stop your blood thinners, this is how long you should generally hold some of the more common ones.  Remember that stopping blood thinners is only necessary for certain procedures. If you are unsure of your instructions, please call us.   Aspirin - 5 days  Plavix/Clopidogrel - 7 days  Warfarin / Coumadin - 5 days  Eliquis - 3 days  Pradaxa/Dabigatran - 4 days  Xarelto/Rivaroxaban - 3 days     If you are a diabetic, do not take your medication if you will be fasting, but bring it with you. Please plan on being here for roughly 2 hours.     Please call us if you have been sick (running fever, having any flu-like symptoms) or have been taking antibiotics in the past 2 weeks or had any outpatient procedures other than with us (colonoscopy, endoscopy, OBGYN, dental, etc.). If you have been previously COVID positive, you will need to hold off on your procedure until you are symptom free for 10 days. If you did not have any symptoms, you can have your procedure 10 days from your positive test result.       *HOLD ALL VITAMINS, MINERALS, HERBS (INCLUDING HERBAL TEAS) AND SUPPLEMENTS  *SHOWER WITH ANTIBACTERIAL SOAP (EX. DIAL) NIGHT BEFORE AND MORNING OF PROCEDURE  *DO NOT APPLY ANY LOTIONS, OILS, POWDERS, PERFUME/COLOGNE, OINTMENTS, GELS, CREAMS, MAKEUP OR DEODORANT TO YOUR SKIN MORNING OF PROCEDURE  *LEAVE JEWELRY AND ANY VALUABLES AT HOME  *WEAR LOOSE COMFORTABLE CLOTHING (PREFERABLY A BUTTON UP SHIRT)        Thank you,  [facility]  Mary, LPN [facility]  Pre-Admit

## 2024-02-15 ENCOUNTER — HOSPITAL ENCOUNTER (OUTPATIENT)
Facility: HOSPITAL | Age: 49
Discharge: HOME | End: 2024-02-15
Attending: EMERGENCY MEDICINE | Admitting: EMERGENCY MEDICINE
Payer: COMMERCIAL

## 2024-02-15 VITALS
DIASTOLIC BLOOD PRESSURE: 82 MMHG | HEART RATE: 50 BPM | TEMPERATURE: 99 F | OXYGEN SATURATION: 96 % | SYSTOLIC BLOOD PRESSURE: 144 MMHG | RESPIRATION RATE: 16 BRPM

## 2024-02-15 DIAGNOSIS — G89.29 CHRONIC PAIN: ICD-10-CM

## 2024-02-15 PROCEDURE — 64484 NJX AA&/STRD TFRM EPI L/S EA: CPT | Mod: RT | Performed by: EMERGENCY MEDICINE

## 2024-02-15 PROCEDURE — 64484 NJX AA&/STRD TFRM EPI L/S EA: CPT | Mod: RT,,, | Performed by: EMERGENCY MEDICINE

## 2024-02-15 PROCEDURE — 63600175 PHARM REV CODE 636 W HCPCS: Performed by: EMERGENCY MEDICINE

## 2024-02-15 PROCEDURE — 64483 NJX AA&/STRD TFRM EPI L/S 1: CPT | Mod: RT | Performed by: EMERGENCY MEDICINE

## 2024-02-15 PROCEDURE — 25000003 PHARM REV CODE 250: Performed by: EMERGENCY MEDICINE

## 2024-02-15 PROCEDURE — 64483 NJX AA&/STRD TFRM EPI L/S 1: CPT | Mod: RT,,, | Performed by: EMERGENCY MEDICINE

## 2024-02-15 PROCEDURE — 25500020 PHARM REV CODE 255: Performed by: EMERGENCY MEDICINE

## 2024-02-15 PROCEDURE — 99152 MOD SED SAME PHYS/QHP 5/>YRS: CPT | Performed by: EMERGENCY MEDICINE

## 2024-02-15 RX ORDER — DEXAMETHASONE SODIUM PHOSPHATE 10 MG/ML
INJECTION INTRAMUSCULAR; INTRAVENOUS
Status: DISCONTINUED | OUTPATIENT
Start: 2024-02-15 | End: 2024-02-15 | Stop reason: HOSPADM

## 2024-02-15 RX ORDER — MIDAZOLAM HYDROCHLORIDE 2 MG/2ML
INJECTION, SOLUTION INTRAMUSCULAR; INTRAVENOUS
Status: DISCONTINUED | OUTPATIENT
Start: 2024-02-15 | End: 2024-02-15 | Stop reason: HOSPADM

## 2024-02-15 RX ORDER — FENTANYL CITRATE 50 UG/ML
INJECTION, SOLUTION INTRAMUSCULAR; INTRAVENOUS
Status: DISCONTINUED | OUTPATIENT
Start: 2024-02-15 | End: 2024-02-15 | Stop reason: HOSPADM

## 2024-02-15 RX ORDER — LIDOCAINE HYDROCHLORIDE 10 MG/ML
INJECTION, SOLUTION EPIDURAL; INFILTRATION; INTRACAUDAL; PERINEURAL
Status: DISCONTINUED | OUTPATIENT
Start: 2024-02-15 | End: 2024-02-15 | Stop reason: HOSPADM

## 2024-02-15 RX ORDER — LIDOCAINE HYDROCHLORIDE 20 MG/ML
INJECTION, SOLUTION EPIDURAL; INFILTRATION; INTRACAUDAL; PERINEURAL
Status: DISCONTINUED | OUTPATIENT
Start: 2024-02-15 | End: 2024-02-15 | Stop reason: HOSPADM

## 2024-02-15 NOTE — INTERVAL H&P NOTE
The patient has been examined and the H&P has been reviewed:    I concur with the findings and no changes have occurred since H&P was written.    Anesthesia/Surgery risks, benefits and alternative options discussed and understood by patient/family.          There are no hospital problems to display for this patient.     PAST SURGICAL HISTORY:  History of ankle surgery 2/16/2021    S/P tonsillectomy

## 2024-02-15 NOTE — DISCHARGE INSTRUCTIONS
[facility] [place]  Dr. Magdaleno James  Messaging service # [phone]    POST-PROCEDURE INSTRUCTIONS:    Today you had an injection that included a steroid medications.  The steroid may or may not have been mixed with a local anesthetic when it was injected.   If the injection was in the neck, you may feel some pressure, numbness, or slight weakness in the arm after the procedure for a short period of time (this is a normal response), if this persists for longer than 1 day please contact our office or go to the emergency room.  If the injection was in the low back, you may feel some pressure, numbness, or slight weakness in the leg after the procedure for a short period of time (this is a normal response), if this persists for longer than 1 day please contact our office or go to the emergency room.  You may get side effects from the steroid.  This is not uncommon.  Symptoms include: elevated blood sugar, elevated blood pressure, headache, flushing, nausea, insomnia.  These symptoms are transient and will resolve within 1-3 days.  If symptoms last longer than this please contact our office or head to the emergency room.  Steroid medications can take anywhere from 3-14 days to take effect (rarely longer).  You may notice that your pain worsens for a short period of time after the injection, this would not be unusual due to the pressure and trauma from the needle.    If you do not have a follow up appointment scheduled, please contact my office (or the office of the physician who referred you for the procedure) to get a post-procedure follow up scheduled 2-4 weeks after the procedure.  This can be done as a virtual visit if that is more convenient for you.      What you need to do:    Keep a record of your response to the injection you had today.    How much relief did you get?   When did the relief start and how long did it last?  Were you able to decrease the use of any of your pain  medications?  Were you able to increase your level of activity?  How long did the relief last?    What to watch out for:    If you experience any of the following symptoms after your procedure, please notify the messaging service immediately (see above for contact information):   fever (increased oral temperature)   bleeding or swelling at the injection site,    drainage, rash or redness at the injection site    possible signs of infection    increased pain at the injection site   worsening of your usual pain   severe headache   new or worsening numbness    new arm and/or leg weakness, or    changes in bowel and/or bladder function: urinating or defecating on yourself and not knowing that you did it.    PLEASE FOLLOW ALL INSTRUCTIONS CAREFULLY     Do not engage in strenuous activity (e.g., lifting or pushing heavy objects or repeated bending) for 24 hours.     Do not take a bath, swim or use Jacuzzi for 24 hours after procedure. (A shower is fine).   Remove any Band-Aids when you get home.    Use cold/ice, as needed for comfort.  We recommend the use of cold therapy alternating on for 20 minutes, off for 20 minutes.    Do not apply direct heat (heating pad or heat packs) to the injection site for 24 hours.     Resume your usual medications, unless instructed otherwise by your Pain Physician.     If you are on warfarin (Coumadin) or other blood thinner, resume this medication as instructed by your prescribing Physician.    IF AT ANY POINT YOU ARE VERY CONCERNED ABOUT YOUR SYMPTOMS, PLEASE GO TO THE EMERGENCY ROOM.    If you develop worsening pain, weakness, numbness, lose bowel or bladder control (i.e., having an accident where you did not even know you had to go to the bathroom and suddenly noticed you soiled yourself), saddle anesthesia (a loss of sensation restricted to the area of the buttocks, anus and between the legs -- i.e., those parts of your body that would touch a saddle if you were sitting on one) you  need to go immediately to the emergency department for evaluation and treatment.    ----------------------------------------------------------------------------------------------------------------------------------------------------------------  If you received Sedation please read the following instructions:  POST SEDATION INSTRUCTIONS    Today you received intravenous medication (also known as sedation) that was used to help you relax and/or decrease discomfort during your procedure. This medication will be acting in your body for the next 24 hours, so you might feel a little tired or sleepy. This feeling will slowly wear off.   Common side effects associated with these medications include: drowsiness, dizziness, sleepiness, confusion, feeling excited, difficulty remembering things, lack of steadiness with walking or balance, loss of fine muscle control, slowed reflexes, difficulty focusing, and blurred vision.  Some over-the-counter and prescription medications (e.g., muscle relaxants, opioids, mood-altering medications, sedatives/hypnotics, antihistamines) can interact with the intravenous medication you received and cause an increased risk of the side effects listed above in addition to other potentially life threatening side effects. Use extreme caution if you are taking such medications, and consult with your Pain Physician or prescribing physician if you have any questions.  For the next 12-24 hours:    DO NOT--Drive a car, operate machinery or power tools   DO NOT--Drink any alcoholic beverages (not even beer), they may dangerously increase the risk of side effects.    DO NOT--Make any important legal or business decisions or sign important documents.  We advise you to have someone to assist you at home. Move slowly and carefully. Do not make sudden changes in position. Be aware of dizziness or light-headedness and move accordingly.   If you seek medical treatment within 24 hours, let the nurse or doctor  caring for you know that you have received the above medications. If you have any questions or concerns related to your sedation or treatment today please contact us.

## 2024-02-15 NOTE — DISCHARGE SUMMARY
Discharge Note  Short Stay      SUMMARY     Admit Date: 2/15/2024    Attending Physician: Magdaleno James      Discharge Physician: Magdaleno James      Discharge Date: 2/15/2024 11:51 AM    Procedure(s) (LRB):  RT L4-5 and L5-S1 TFESI (Right)    Final Diagnosis: Lumbosacral radiculopathy [M54.17]    Disposition: Home or self care    Patient Instructions:   Current Discharge Medication List        CONTINUE these medications which have NOT CHANGED    Details   atorvastatin (LIPITOR) 10 MG tablet TAKE 1 TABLET(10 MG) BY MOUTH EVERY DAY  Qty: 90 tablet, Refills: 1    Associated Diagnoses: Hyperlipidemia, unspecified hyperlipidemia type      multivitamin (THERAGRAN) per tablet Take 1 tablet by mouth once daily.      pregabalin (LYRICA) 75 MG capsule Take 1 capsule (75 mg total) by mouth 2 (two) times daily.  Qty: 60 capsule, Refills: 0      sildenafiL (VIAGRA) 100 MG tablet Take 1 tablet (100 mg total) by mouth daily as needed for Erectile Dysfunction.  Qty: 30 tablet, Refills: 2    Associated Diagnoses: Erectile dysfunction, unspecified erectile dysfunction type                 Discharge Diagnosis: Lumbosacral radiculopathy [M54.17]  Condition on Discharge: Stable with no complications to procedure   Diet on Discharge: Same as before.  Activity: as per instruction sheet.  Discharge to: Home with a responsible adult.  Follow up: 2-4 weeks       Please call my office or pager at [phone] if experienced any weakness or loss of sensation, fever > 101.5, pain uncontrolled with oral medications, persistent nausea/vomiting/or diarrhea, redness or drainage from the incisions, or any other worrisome concerns. If physician on call was not reached or could not communicate with our office for any reason please go to the nearest emergency department

## 2024-02-15 NOTE — PLAN OF CARE
Pt in preop bay 24, VSS and IV inserted. Pt drank 8 oz of water at 0900, MD notified, pt to be postponed to the noon slot for NPO regulations.  Pt denies any open wounds on body or the use of any immunizations or antibiotics in the past 2 weeks. Pt needs site marking and consents, otherwise ready to roll.

## 2024-02-15 NOTE — OP NOTE
Lumbar Transforaminal Epidural Steroid Injection under Fluoroscopic Guidance    The procedure, risks, benefits, and options were discussed with the patient. There are no contraindications to the procedure. The patent expressed understanding and agreed to the procedure. Informed written consent was obtained prior to the start of the procedure and can be found in the patient's chart.    PATIENT NAME: Daniel Garcias   MRN: [id number]     DATE OF PROCEDURE: 02/15/2024    PROCEDURE:  Right  L4/5 and L5/S1 Lumbar Transforaminal Epidural Steroid Injection under Fluoroscopic Guidance    PRE-OP DIAGNOSIS: Lumbosacral radiculopathy [M54.17] Lumbar radiculopathy [M54.16]    POST-OP DIAGNOSIS: Same    PHYSICIAN: Magdaleno James MD    ASSISTANTS: ASAEL     MEDICATIONS INJECTED: Preservative-free Decadron 10mg with 5cc of Lidocaine 1% MPF     LOCAL ANESTHETIC INJECTED: Xylocaine 2%     SEDATION: Versed 1mg and Fentanyl 50mcg                                                                                                                                                                                     Conscious sedation ordered by M.D. Patient re-evaluation prior to administration of conscious sedation. No changes noted in patient's status from initial evaluation. The patient's vital signs were monitored by RN and patient remained hemodynamically stable throughout the procedure.    Event Time In   Sedation Start 1136       ESTIMATED BLOOD LOSS: None    COMPLICATIONS: None    TECHNIQUE: Time-out was performed to identify the patient and procedure to be performed. With the patient laying in a prone position, the surgical area was prepped and draped in the usual sterile fashion using ChloraPrep and a fenestrated drape.The levels were determined under fluoroscopy guidance. Skin anesthesia was achieved by injecting Lidocaine 2% over the injection sites. The transforaminal spaces were then approached with a 22 gauge, 5 inch spinal quinke  needle that was introduced under fluoroscopic guidance in the AP and Lateral views. Once the needle tip was in the area of the transforaminal space, and there was no blood, CSF or paraesthesias, contrast dye Omnipaque (300mg/mL) was injected to confirm placement and there was no vascular runoff. Fluoroscopic imaging in the AP and lateral views revealed a clear outline of the spinal nerve with proximal spread of agent through the neural foramen into the epidural space. 3 mL of the medication mixture listed above was injected slowly at each site. Displacement of the radio opaque contrast after injection of the medication confirmed that the medication went into the area of the transforaminal spaces. The needles were removed and bleeding was nil. A sterile dressing was applied. No specimens collected. The patient tolerated the procedure well.     The patient was monitored after the procedure in the recovery area. They were given post-procedure and discharge instructions to follow at home. The patient was discharged in a stable condition.        Magdaleno James MD

## 2024-02-15 NOTE — PLAN OF CARE
Patient vital signs stable and pt has no complaints at this time. Discharge instructions reviewed with pt and understood. No questions or concerns from pt at this time. IV removed and pt belongings returned. Pt escorted to vehicle via wheelchair.

## 2024-03-04 ENCOUNTER — OFFICE VISIT (OUTPATIENT)
Dept: PAIN MEDICINE | Facility: CLINIC | Age: 49
End: 2024-03-04
Payer: COMMERCIAL

## 2024-03-04 VITALS
WEIGHT: 290.38 LBS | HEART RATE: 55 BPM | HEIGHT: 76 IN | BODY MASS INDEX: 35.36 KG/M2 | DIASTOLIC BLOOD PRESSURE: 87 MMHG | SYSTOLIC BLOOD PRESSURE: 153 MMHG

## 2024-03-04 DIAGNOSIS — M51.37 DDD (DEGENERATIVE DISC DISEASE), LUMBOSACRAL: ICD-10-CM

## 2024-03-04 DIAGNOSIS — M54.17 LUMBOSACRAL RADICULOPATHY: Primary | ICD-10-CM

## 2024-03-04 PROCEDURE — 99214 OFFICE O/P EST MOD 30 MIN: CPT | Mod: S$GLB,,, | Performed by: EMERGENCY MEDICINE

## 2024-03-04 PROCEDURE — 99999 PR PBB SHADOW E&M-EST. PATIENT-LVL III: CPT | Mod: PBBFAC,,, | Performed by: EMERGENCY MEDICINE

## 2024-03-04 RX ORDER — PREGABALIN 75 MG/1
CAPSULE ORAL
Qty: 46 CAPSULE | Refills: 0 | Status: SHIPPED | OUTPATIENT
Start: 2024-03-04 | End: 2024-04-29

## 2024-03-04 NOTE — PROGRESS NOTES
Chronic Pain-Established (Follow up visit)     Interval History 03/04/24: The patient had a right TFESI at L4/5 and L5/S1 on 02/15/24 and they report 40% pain relief. Patient was offered medications during their last visit and they deferred.  They are not participating in physical therapy and are participating in home exercise plan.    Original HPI: Daniel Garcias is a 48 y.o. year old male patient who has a past medical history of Cervical radiculopathy, Cervical strain, History of repair of ACL - 1994, meniscus 2007, Intractable episodic headache, and Sciatica of right side. He presents in referral from No ref. provider found for radicular pain    Original Pain Description:  The pain is located in the right leg and is radiating to the right foot / plantar surface. The pain is described as aching, numbing, and tingling. Exacerbating factors: Walking. Mitigating factors nothing. Symptoms interfere with daily activity. The patient feels like symptoms have been worsening. Patient reports loss of sensations.  Patient had a MICRODISCECTOMY L5/S1 3/23. Patient reports that the back pain resolved, but that the leg pain radicular symptoms persisted.     Original PAIN SCORES:  Best: Pain is 7  Current: Pain is 9  Worst: Pain is 9        1/23/2024     1:07 PM   Last 3 PDI Scores   Pain Disability Index (PDI) 42       6 weeks of Conservative therapy:  PT:  No  Chiro:  HEP: Not yet    Treatments / Medications: (Ice/Heat/NSAIDS/APAP/etc):  Lyrica 75 - not taking, did not want to take it  Norco - not taking    Antiplatelets/Anticoagulants:  No    Interventional Pain Procedures: (Previous injections)  10/21/22 L5/S1 Right TFESI  03/27/23 L5/S1 Right microdiscectomy  02/15/24 L4/5 & L5/S1 Right TFESI    IMAGING:    MRI LUMBAR SPINE W WO CONTRAST 01/16/2024  L2-3: Circumferential disc bulge and bilateral facet arthropathy.  Mild spinal canal stenosis and mild bilateral neural foraminal narrowing.  L3-4: Circumferential disc  bulge with a superimposed left foraminal/extraforaminal disc protrusion which may abut the exiting left L3 nerve root.  Bilateral facet arthropathy and ligamentum flavum buckling.  Moderate spinal canal stenosis as well as severe left and moderate right neural foraminal narrowing.  L4-5: Circumferential disc bulge.  Bilateral facet arthropathy and ligamentum flavum buckling.  Mild spinal canal stenosis as well as moderate bilateral neural foraminal narrowing.  Small synovial cyst superimposed right L4-L5 level contributing to central encroachment  L5-S1: Postoperative change.  Broad-based posterior disc bulge.  Bilateral facet arthropathy.  Mild right neural foraminal narrowing.  No spinal canal stenosis.     Postoperative change of L5-S1 hemilaminectomy and bilateral foraminotomies.     Multilevel degenerative changes of the lumbar spine producing moderate spinal canal stenosis at L1-L2 and L3-L4.  Degenerative findings also contribute to severe left neural foraminal narrowing at L3-4. level by level details as per above.       Past Surgical History:   Procedure Laterality Date    COLONOSCOPY N/A 4/11/2018    Procedure: COLONOSCOPY golytely;  Surgeon: Deborah Gamino MD;  Location: [facility] ENDO;  Service: Endoscopy;  Laterality: N/A;    EPIDURAL STEROID INJECTION INTO LUMBAR SPINE Right 2/15/2024    Procedure: RT L4-5 and L5-S1 TFESI;  Surgeon: Magdaleno James MD;  Location: [facility] PAIN [facility]GEMENT;  Service: Pain Management;  Laterality: Right;  30 mins    KNEE ARTHROSCOPY W/ ACL RECONSTRUCTION      left    KNEE ARTHROSCOPY W/ MENISCAL REPAIR  1998    left    MINIMALLY INVASIVE SURGICAL REMOVAL OF INTERVERTEBRAL DISC OF SPINE USING MICROSCOPE N/A 3/27/2023    Procedure: RIGHT L5-S1 MICRODISCECTOMY,MINIMALLY INVASIVE APPROACH;  Surgeon: Saad Gordon MD;  Location: [facility] OR;  Service: Neurosurgery;  Laterality: N/A;  RIGHT L5-S1 MICRODISCECTOMY,MINIMALLY INVASIVE APPROACH   ANESTHESIA: GENERAL   NERVE MON; EMG,  SEP  POSITION: PRONE  BED: MIGUELINA 4 POST/ KATE FRAME  HEAD REST: PRONE VIEW  RADIOLOGY: C-ARM  MISC: MICROSCOPE, CARO    TRANSFORAMINAL EPIDURAL INJECTION OF STEROID Right 10/21/2022    Procedure: LUMBAR TRANSFORAMINAL RIGHT L5/S1 CONTRAST DIRECT REFERRAL;  Surgeon: Simone Prather MD;  Location: [facility] PAIN MGT;  Service: Pain Management;  Laterality: Right;       Social History     Socioeconomic History    Marital status:     Number of children: 3   Occupational History    Occupation: Operations   Tobacco Use    Smoking status: Never    Smokeless tobacco: Never   Substance and Sexual Activity    Alcohol use: No    Drug use: No    Sexual activity: Yes     Partners: Female       Medications/Allergies: See med card    ROS:  GENERAL: No fever. No chills. No fatigue. Denies weight loss. Denies weight gain.  Back / musculoskeletal / neuro : See HPI    VITALS:   There were no vitals filed for this visit.    There is no height or weight on file to calculate BMI.      1/23/2024     1:07 PM   Last 3 PDI Scores   Pain Disability Index (PDI) 42       PHYSICAL EXAM:   GENERAL: Well appearing, in no acute distress, alert and oriented x3.  PSYCH:  Mood and affect appropriate.  SKIN: Skin color, texture, turgor normal, no rashes or lesions.  HEENT:  Normocephalic, atraumatic. Cranial nerves grossly intact.  NECK: No pain to palpation over the cervical paraspinous muscles. No pain to palpation over facets. No pain with neck flexion, extension, or lateral flexion.   PULM: No evidence of respiratory difficulty, symmetric chest rise.  GI:  Non-distended  BACK: Limited range of motion. Palpation over the lower lumbar spinous processes. There is no pain with palpation over the sacroiliac joints bilaterally.   EXTREMITIES: No deformities, edema, or skin discoloration.   MUSCULOSKELETAL: Shoulder, hip, and knee provocative maneuvers are negative. No atrophy is noted.  NEURO: Sensation is equal and appropriate bilaterally.  Bilateral upper and lower extremity strength is normal and symmetric. Bilateral upper and lower extremity coordination and muscle stretch reflexes are physiologic and symmetric. Plantar response are downgoing. Straight leg raising in the supine position is negative to radicular pain.   GAIT: normal.      LABS:    Lab Results   Component Value Date    HGBA1C 5.3 07/27/2022       Lab Results   Component Value Date    WBC 4.18 01/25/2023    HGB 14.6 01/25/2023    HCT 45.7 01/25/2023    MCV 88 01/25/2023     01/25/2023       ASSESSMENT: 48 y.o. year old male with pain, consistent with:    No diagnosis found.      DISCUSSION: Daniel Garcias is a patient who comes to us with right radiculopathy      PLAN:  - I have stressed the importance of physical activity and a home exercise plan to help with pain and improve health.  - Patient can continue with medications for now since they are providing benefits, using them appropriately, and without side effects.  - Counseled patient regarding the importance of activity modification and constant sleeping habits.  - Consider caudal SEAN vs L4/5 SEAN VS repeat L4/5 and L5/S1 right TFESI if his pain persists  - Anticoagulation use: No no anticoagulation  - Medications: Lyrica 75mg and ramp up to BID  - Wants to try Lyrica before moving to PT  - Imaging: Reviewed available imaging with patient and answered any questions they had regarding study.  - The patient's pathophysiology was explained in detail with reference to x-rays, models, other visual aids as appropriate.   - Follow up visit: return to clinic in 4 weeks post procedure      Magdaleno James MD  03/04/2024

## 2024-03-15 ENCOUNTER — OFFICE VISIT (OUTPATIENT)
Dept: INTERNAL MEDICINE | Facility: CLINIC | Age: 49
End: 2024-03-15
Attending: FAMILY MEDICINE
Payer: COMMERCIAL

## 2024-03-15 VITALS
HEART RATE: 55 BPM | WEIGHT: 288.81 LBS | SYSTOLIC BLOOD PRESSURE: 150 MMHG | OXYGEN SATURATION: 99 % | DIASTOLIC BLOOD PRESSURE: 90 MMHG | BODY MASS INDEX: 35.91 KG/M2 | HEIGHT: 75 IN

## 2024-03-15 DIAGNOSIS — Z12.5 PROSTATE CANCER SCREENING: ICD-10-CM

## 2024-03-15 DIAGNOSIS — I10 HYPERTENSION, ESSENTIAL: ICD-10-CM

## 2024-03-15 DIAGNOSIS — Z00.00 ANNUAL PHYSICAL EXAM: Primary | ICD-10-CM

## 2024-03-15 DIAGNOSIS — E78.5 HYPERLIPIDEMIA, UNSPECIFIED HYPERLIPIDEMIA TYPE: ICD-10-CM

## 2024-03-15 DIAGNOSIS — Z12.11 COLON CANCER SCREENING: ICD-10-CM

## 2024-03-15 PROCEDURE — 99396 PREV VISIT EST AGE 40-64: CPT | Mod: S$GLB,,, | Performed by: FAMILY MEDICINE

## 2024-03-15 PROCEDURE — 99999 PR PBB SHADOW E&M-EST. PATIENT-LVL IV: CPT | Mod: PBBFAC,,, | Performed by: FAMILY MEDICINE

## 2024-03-15 RX ORDER — ATORVASTATIN CALCIUM 10 MG/1
10 TABLET, FILM COATED ORAL DAILY
Qty: 90 TABLET | Refills: 3 | Status: SHIPPED | OUTPATIENT
Start: 2024-03-15 | End: 2024-03-16

## 2024-03-15 RX ORDER — AMLODIPINE BESYLATE 5 MG/1
5 TABLET ORAL DAILY
Qty: 90 TABLET | Refills: 1 | Status: SHIPPED | OUTPATIENT
Start: 2024-03-15

## 2024-03-15 NOTE — PROGRESS NOTES
Subjective:       Patient ID: Daniel Garcias is a 48 y.o. male.    Chief Complaint: Annual Exam    Established patient for an annual wellness check/physical exam and also chronic disease management. Specific complaints - see dictation, M*model entries and please see ROS.  Past, Surgical, Family, Social Histories; Medications, Allergies reviewed and reconciled.  Health maintenance file reviewed and addressed items due. Recent applicable lab, imaging and cardiovascular results reviewed.  Problem list items reviewed and modified or added entries (in the overview section) may not be transcribed into this encounter note due to note writer format.      BP's hi over past visits.        Review of Systems   Constitutional:  Negative for appetite change, chills, diaphoresis, fatigue and fever.   HENT:  Negative for congestion, postnasal drip, rhinorrhea, sore throat and trouble swallowing.    Eyes:  Negative for visual disturbance.   Respiratory:  Negative for cough, choking, chest tightness, shortness of breath and wheezing.    Cardiovascular:  Negative for chest pain and leg swelling.   Gastrointestinal:  Negative for abdominal distention, abdominal pain, diarrhea, nausea and vomiting.   Genitourinary:  Negative for difficulty urinating and hematuria.   Musculoskeletal:  Negative for arthralgias and myalgias.   Skin:  Negative for rash.   Neurological:  Negative for weakness, light-headedness and headaches.   Psychiatric/Behavioral:  Negative for confusion and dysphoric mood.        Objective:      Physical Exam  Vitals and nursing note reviewed.   Constitutional:       Appearance: He is well-developed. He is not diaphoretic.   Eyes:      General: No scleral icterus.  Neck:      Thyroid: No thyromegaly.      Vascular: No carotid bruit or JVD.      Trachea: No tracheal deviation.   Cardiovascular:      Rate and Rhythm: Normal rate and regular rhythm.      Heart sounds: Normal heart sounds. No murmur heard.     No friction  rub. No gallop.   Pulmonary:      Effort: Pulmonary effort is normal. No respiratory distress.      Breath sounds: Normal breath sounds. No wheezing or rales.   Abdominal:      General: There is no distension.      Palpations: Abdomen is soft. There is no mass.      Tenderness: There is no abdominal tenderness. There is no guarding or rebound.   Musculoskeletal:      Cervical back: Normal range of motion and neck supple.   Lymphadenopathy:      Cervical: No cervical adenopathy.   Skin:     General: Skin is warm and dry.      Findings: No erythema or rash.   Neurological:      Mental Status: He is alert and oriented to person, place, and time.      Cranial Nerves: No cranial nerve deficit.      Motor: No tremor.      Coordination: Coordination normal.      Gait: Gait normal.   Psychiatric:         Behavior: Behavior normal.         Thought Content: Thought content normal.         Judgment: Judgment normal.         Assessment:       1. Annual physical exam    2. Hyperlipidemia, unspecified hyperlipidemia type    3. Hypertension, essential    4. Prostate cancer screening    5. Colon cancer screening        Plan:     Medication List with Changes/Refills   New Medications    AMLODIPINE (NORVASC) 5 MG TABLET    Take 1 tablet (5 mg total) by mouth once daily.   Current Medications    MULTIVITAMIN (THERAGRAN) PER TABLET    Take 1 tablet by mouth once daily.    PREGABALIN (LYRICA) 75 MG CAPSULE    Take 1 capsule (75 mg total) by mouth every evening for 14 days, THEN 1 capsule (75 mg total) 2 (two) times daily for 16 days.    SILDENAFIL (VIAGRA) 100 MG TABLET    Take 1 tablet (100 mg total) by mouth daily as needed for Erectile Dysfunction.   Changed and/or Refilled Medications    Modified Medication Previous Medication    ATORVASTATIN (LIPITOR) 10 MG TABLET atorvastatin (LIPITOR) 10 MG tablet       Take 1 tablet (10 mg total) by mouth once daily.    TAKE 1 TABLET(10 MG) BY MOUTH EVERY DAY     1. Annual physical exam  -      Comprehensive Metabolic Panel; Future; Expected date: 03/15/2024  -     Lipid Panel; Future; Expected date: 03/15/2024  -     PSA, Screening; Future; Expected date: 03/15/2024  -     Hepatitis C Antibody; Future; Expected date: 03/15/2024    2. Hyperlipidemia, unspecified hyperlipidemia type  Overview:  -[facility] -206 ([phone])    Orders:  -     Lipid Panel; Future; Expected date: 03/15/2024  -     atorvastatin (LIPITOR) 10 MG tablet; Take 1 tablet (10 mg total) by mouth once daily.  Dispense: 90 tablet; Refill: 3    3. Hypertension, essential  Assessment & Plan:  -?amlodipine and lisinopril in past prior PCP  -restart amlodipine 5, take half first week    Orders:  -     Comprehensive Metabolic Panel; Future; Expected date: 03/15/2024  -     amLODIPine (NORVASC) 5 MG tablet; Take 1 tablet (5 mg total) by mouth once daily.  Dispense: 90 tablet; Refill: 1    4. Prostate cancer screening  -     PSA, Screening; Future; Expected date: 03/15/2024    5. Colon cancer screening  -     Ambulatory referral/consult to Endo Procedure ; Future; Expected date: 03/16/2024      See meds, orders, follow up, routing and instructions sections of encounter and AVS. Discussed with patient and provided on AVS.    Discussed diet and exercise as therapeutic modalities for metabolic and other conditions. Provided patient information, which are included as links on the AVS for detailed information.    Lab Results   Component Value Date     01/25/2023    K 4.0 01/25/2023     01/25/2023    BUN 12 01/25/2023    CREATININE 1.1 01/25/2023    GLU 93 01/25/2023    HGBA1C 5.3 07/27/2022    MG 2.3 11/19/2007    AST 10 01/25/2023    ALT 15 01/25/2023    ALBUMIN 3.8 01/25/2023    PROT 7.1 01/25/2023    BILITOT 0.7 01/25/2023    CHOL 204 (H) 01/25/2023    HDL 50 01/25/2023    LDLCALC 139.0 01/25/2023    TRIG 75 01/25/2023    WBC 4.18 01/25/2023    HGB 14.6 01/25/2023    HCT 45.7 01/25/2023     01/25/2023    PSA 0.73  01/25/2023    TSH 0.616 07/27/2022

## 2024-03-15 NOTE — PATIENT INSTRUCTIONS
Schedule lab orders for tomorrow.      If not contacted in a couple weeks by colonoscopy scheduling department - call Colonoscopy Scheduling Number - [phone].

## 2024-03-16 ENCOUNTER — LAB VISIT (OUTPATIENT)
Dept: LAB | Facility: HOSPITAL | Age: 49
End: 2024-03-16
Attending: FAMILY MEDICINE
Payer: COMMERCIAL

## 2024-03-16 ENCOUNTER — TELEPHONE (OUTPATIENT)
Dept: INTERNAL MEDICINE | Facility: CLINIC | Age: 49
End: 2024-03-16
Payer: COMMERCIAL

## 2024-03-16 DIAGNOSIS — Z00.00 ANNUAL PHYSICAL EXAM: ICD-10-CM

## 2024-03-16 DIAGNOSIS — Z12.5 PROSTATE CANCER SCREENING: ICD-10-CM

## 2024-03-16 DIAGNOSIS — I10 HYPERTENSION, ESSENTIAL: ICD-10-CM

## 2024-03-16 DIAGNOSIS — E78.5 HYPERLIPIDEMIA, UNSPECIFIED HYPERLIPIDEMIA TYPE: Primary | ICD-10-CM

## 2024-03-16 DIAGNOSIS — E78.5 HYPERLIPIDEMIA, UNSPECIFIED HYPERLIPIDEMIA TYPE: ICD-10-CM

## 2024-03-16 LAB
ALBUMIN SERPL BCP-MCNC: 3.6 G/DL (ref 3.5–5.2)
ALP SERPL-CCNC: 51 U/L (ref 55–135)
ALT SERPL W/O P-5'-P-CCNC: 15 U/L (ref 10–44)
ANION GAP SERPL CALC-SCNC: 5 MMOL/L (ref 8–16)
AST SERPL-CCNC: 11 U/L (ref 10–40)
BILIRUB SERPL-MCNC: 0.7 MG/DL (ref 0.1–1)
BUN SERPL-MCNC: 13 MG/DL (ref 6–20)
CALCIUM SERPL-MCNC: 9.3 MG/DL (ref 8.7–10.5)
CHLORIDE SERPL-SCNC: 105 MMOL/L (ref 95–110)
CHOLEST SERPL-MCNC: 254 MG/DL (ref 120–199)
CHOLEST/HDLC SERPL: 5.3 {RATIO} (ref 2–5)
CO2 SERPL-SCNC: 30 MMOL/L (ref 23–29)
COMPLEXED PSA SERPL-MCNC: 0.66 NG/ML (ref 0–4)
CREAT SERPL-MCNC: 1.1 MG/DL (ref 0.5–1.4)
EST. GFR  (NO RACE VARIABLE): >60 ML/MIN/1.73 M^2
GLUCOSE SERPL-MCNC: 92 MG/DL (ref 70–110)
HCV AB SERPL QL IA: NORMAL
HDLC SERPL-MCNC: 48 MG/DL (ref 40–75)
HDLC SERPL: 18.9 % (ref 20–50)
LDLC SERPL CALC-MCNC: 188.6 MG/DL (ref 63–159)
NONHDLC SERPL-MCNC: 206 MG/DL
POTASSIUM SERPL-SCNC: 4.1 MMOL/L (ref 3.5–5.1)
PROT SERPL-MCNC: 6.8 G/DL (ref 6–8.4)
SODIUM SERPL-SCNC: 140 MMOL/L (ref 136–145)
TRIGL SERPL-MCNC: 87 MG/DL (ref 30–150)

## 2024-03-16 PROCEDURE — 80053 COMPREHEN METABOLIC PANEL: CPT | Performed by: FAMILY MEDICINE

## 2024-03-16 PROCEDURE — 36415 COLL VENOUS BLD VENIPUNCTURE: CPT | Performed by: FAMILY MEDICINE

## 2024-03-16 PROCEDURE — 84153 ASSAY OF PSA TOTAL: CPT | Performed by: FAMILY MEDICINE

## 2024-03-16 PROCEDURE — 80061 LIPID PANEL: CPT | Performed by: FAMILY MEDICINE

## 2024-03-16 PROCEDURE — 86803 HEPATITIS C AB TEST: CPT | Performed by: FAMILY MEDICINE

## 2024-03-16 RX ORDER — ATORVASTATIN CALCIUM 20 MG/1
20 TABLET, FILM COATED ORAL DAILY
Qty: 90 TABLET | Refills: 1 | Status: SHIPPED | OUTPATIENT
Start: 2024-03-16

## 2024-03-16 NOTE — TELEPHONE ENCOUNTER
Please call patient and explain that tests show cholesterol is high. I recommend atorvastatin dose increase to 20 mg and I will call that into pharmacy.    I recommend follow up testing. Please see orders for Lipids and ALT ASTand please schedule in 3 months.     Thank you.

## 2024-04-29 ENCOUNTER — OFFICE VISIT (OUTPATIENT)
Dept: INTERNAL MEDICINE | Facility: CLINIC | Age: 49
End: 2024-04-29
Payer: COMMERCIAL

## 2024-04-29 ENCOUNTER — OFFICE VISIT (OUTPATIENT)
Dept: PAIN MEDICINE | Facility: CLINIC | Age: 49
End: 2024-04-29
Payer: COMMERCIAL

## 2024-04-29 ENCOUNTER — TELEPHONE (OUTPATIENT)
Dept: NEUROSURGERY | Facility: CLINIC | Age: 49
End: 2024-04-29
Payer: COMMERCIAL

## 2024-04-29 VITALS
SYSTOLIC BLOOD PRESSURE: 120 MMHG | WEIGHT: 291.56 LBS | HEIGHT: 75 IN | BODY MASS INDEX: 36.25 KG/M2 | HEART RATE: 56 BPM | RESPIRATION RATE: 10 BRPM | TEMPERATURE: 98 F | DIASTOLIC BLOOD PRESSURE: 78 MMHG | OXYGEN SATURATION: 98 %

## 2024-04-29 VITALS
BODY MASS INDEX: 35.91 KG/M2 | HEIGHT: 75 IN | SYSTOLIC BLOOD PRESSURE: 153 MMHG | DIASTOLIC BLOOD PRESSURE: 82 MMHG | HEART RATE: 54 BPM | WEIGHT: 288.81 LBS

## 2024-04-29 DIAGNOSIS — M54.9 DORSALGIA, UNSPECIFIED: Primary | ICD-10-CM

## 2024-04-29 DIAGNOSIS — I10 HYPERTENSION, ESSENTIAL: Primary | ICD-10-CM

## 2024-04-29 DIAGNOSIS — E66.9 OBESITY (BMI 30-39.9): ICD-10-CM

## 2024-04-29 PROCEDURE — 99214 OFFICE O/P EST MOD 30 MIN: CPT | Mod: S$GLB,,, | Performed by: EMERGENCY MEDICINE

## 2024-04-29 PROCEDURE — 99999 PR PBB SHADOW E&M-EST. PATIENT-LVL V: CPT | Mod: PBBFAC,,, | Performed by: NURSE PRACTITIONER

## 2024-04-29 PROCEDURE — 99999 PR PBB SHADOW E&M-EST. PATIENT-LVL III: CPT | Mod: PBBFAC,,, | Performed by: EMERGENCY MEDICINE

## 2024-04-29 PROCEDURE — 99213 OFFICE O/P EST LOW 20 MIN: CPT | Mod: S$GLB,,, | Performed by: NURSE PRACTITIONER

## 2024-04-29 RX ORDER — TIZANIDINE 4 MG/1
4 TABLET ORAL NIGHTLY PRN
Qty: 30 TABLET | Refills: 0 | Status: SHIPPED | OUTPATIENT
Start: 2024-04-29 | End: 2024-05-29

## 2024-04-29 RX ORDER — MELOXICAM 7.5 MG/1
7.5 TABLET ORAL DAILY PRN
Qty: 30 TABLET | Refills: 0 | Status: SHIPPED | OUTPATIENT
Start: 2024-04-29 | End: 2024-05-29

## 2024-04-29 NOTE — TELEPHONE ENCOUNTER
Called patient to schedule an upcoming appointment with PAOLA Owens  and provided next appointment date and time.  Future Appointments   Date Time Provider Department Center   5/21/2024  8:30 AM OC  MRI1 OC MRI [place]   5/22/2024  2:00 PM Brooke Mcgowan, NP N[facility] NEUROS8 [facility]   5/28/2024  1:30 PM Magdaleno James MD [facility] PAINMA [place]   5/30/2024  9:20 AM PRE-ADMIT NURSE 2, ENDO -[facility] ENDOPP4 [facility]   6/18/2024  7:00 AM LAB, APPOINTMENT [facility] LAB IM Roni wendy PCW        Patient voiced understanding and thanked me.

## 2024-04-29 NOTE — PATIENT INSTRUCTIONS
Take medications as prescribed. No changes since readings normal    Monitor BP at home, goal BP < or = 140/80, call office if consistently above this range.    Follow low salt DASH diet and exercise.    BMI reviewed.    Go to ED if Headaches, blurred vision, chest pain, or SOB occurs along with elevated readings > or = 160/90.

## 2024-04-29 NOTE — PROGRESS NOTES
Subjective     Patient ID: Daniel Garcias is a 48 y.o. male.    Chief Complaint: Follow-up    Pt of Dr. Izquierdo, here for 4 week follow up on HTN    Seen by Dr. Izquierdo on 3-15-24 for his annual. BP was 150/90. Plan of care was the following relative to today's visit:  Hypertension, essential  Assessment & Plan:  -?amlodipine and lisinopril in past prior PCP  -restart amlodipine 5, take half first week     Orders:  -     Comprehensive Metabolic Panel; Future; Expected date: 03/15/2024  -     amLODIPine (NORVASC) 5 MG tablet; Take 1 tablet (5 mg total) by mouth once daily.  Dispense: 90 tablet; Refill:     Follow up in about 4 weeks (around 4/12/2024) for BP Recheck, Schedule with APRN or LARISA.    Has been taking the 5 mg tablet daily without problems.    Has BP cuff at home.    Saw pain management earlier today for his leg and back. They are working him up to see if the back is causing his leg issues. BP was elevated at that appt this morning 153/82      Review of Systems   Constitutional:  Negative for chills and fever.   Eyes:  Negative for visual disturbance.   Respiratory:  Negative for chest tightness and shortness of breath.    Cardiovascular:  Negative for chest pain.   Gastrointestinal:  Negative for abdominal pain, constipation, diarrhea and nausea.   Genitourinary:  Negative for dysuria and hematuria.   Allergic/Immunologic: Negative for environmental allergies, food allergies and frequent infections.   Neurological:  Negative for dizziness, syncope, weakness, light-headedness, numbness and headaches.   Hematological:  Negative for adenopathy. Does not bruise/bleed easily.   Psychiatric/Behavioral:  Negative for suicidal ideas.          Review of patient's allergies indicates:   Allergen Reactions    Shellfish containing products Hives     Current Outpatient Medications:     amLODIPine (NORVASC) 5 MG tablet, Take 1 tablet (5 mg total) by mouth once daily., Disp: 90 tablet, Rfl: 1    atorvastatin  (LIPITOR) 20 MG tablet, Take 1 tablet (20 mg total) by mouth once daily., Disp: 90 tablet, Rfl: 1    meloxicam (MOBIC) 7.5 MG tablet, Take 1 tablet (7.5 mg total) by mouth daily as needed for Pain., Disp: 30 tablet, Rfl: 0    multivitamin (THERAGRAN) per tablet, Take 1 tablet by mouth once daily., Disp: , Rfl:     sildenafiL (VIAGRA) 100 MG tablet, Take 1 tablet (100 mg total) by mouth daily as needed for Erectile Dysfunction., Disp: 30 tablet, Rfl: 2    tiZANidine (ZANAFLEX) 4 MG tablet, Take 1 tablet (4 mg total) by mouth nightly as needed (muscle tightness and spasms)., Disp: 30 tablet, Rfl: 0    Patient Active Problem List   Diagnosis    Family history of colon cancer    Hyperlipidemia    Trigeminal neuralgia    Vitamin D deficiency    Occipital neuralgia of right side    Right leg numbness    Decreased range of motion of lumbar spine    Muscle weakness    Lumbar spondylosis    Hypertension, essential       Past Medical History:   Diagnosis Date    Cervical radiculopathy 10/29/2019    Cervical strain 4/20/2021    History of repair of ACL - 1994, meniscus 2007 6/24/2019    1994, meniscus 2007    Intractable episodic headache 4/22/2019    Sciatica of right side 4/23/2019       Past Surgical History:   Procedure Laterality Date    COLONOSCOPY N/A 4/11/2018    Procedure: COLONOSCOPY golytely;  Surgeon: Deborah Gamino MD;  Location: [facility] ENDO;  Service: Endoscopy;  Laterality: N/A;    EPIDURAL STEROID INJECTION INTO LUMBAR SPINE Right 2/15/2024    Procedure: RT L4-5 and L5-S1 TFESI;  Surgeon: Magdaleno James MD;  Location: [facility] PAIN MANAGEMENT;  Service: Pain Management;  Laterality: Right;  30 mins    KNEE ARTHROSCOPY W/ ACL RECONSTRUCTION      left    KNEE ARTHROSCOPY W/ MENISCAL REPAIR  1998    left    MINIMALLY INVASIVE SURGICAL REMOVAL OF INTERVERTEBRAL DISC OF SPINE USING MICROSCOPE N/A 3/27/2023    Procedure: RIGHT L5-S1 MICRODISCECTOMY,MINIMALLY INVASIVE APPROACH;  Surgeon: Saad Gordon MD;   "Location: [facility] OR;  Service: Neurosurgery;  Laterality: N/A;  RIGHT L5-S1 MICRODISCECTOMY,MINIMALLY INVASIVE APPROACH   ANESTHESIA: GENERAL   NERVE MON; EMG, SEP  POSITION: PRONE  BED: Leonard 4 POST/ KATE FRAME  HEAD REST: PRONE VIEW  RADIOLOGY: C-ARM  MISC: MICROSCOPE, CARO    TRANSFORAMINAL EPIDURAL INJECTION OF STEROID Right 10/21/2022    Procedure: LUMBAR TRANSFORAMINAL RIGHT L5/S1 CONTRAST DIRECT REFERRAL;  Surgeon: Simone Prather MD;  Location: [facility];  Service: Pain Management;  Laterality: Right;       Social History     Socioeconomic History    Marital status:     Number of children: 3   Occupational History    Occupation: Operations   Tobacco Use    Smoking status: Never    Smokeless tobacco: Never   Substance and Sexual Activity    Alcohol use: No    Drug use: No    Sexual activity: Yes     Partners: Female       Family History   Problem Relation Name Age of Onset    Hypertension Mother      Hyperlipidemia Father      Cancer Maternal Grandmother          colon       Objective     Vitals:    04/29/24 1446 04/29/24 1457   BP: 132/80 120/78   Pulse: (!) 56    Resp: 10    Temp: 98.4 °F (36.9 °C)    TempSrc: Oral    SpO2: 98%    Weight: 132.2 kg (291 lb 8.9 oz)    Height: 6' 3" (1.905 m)    PainSc:   7    PainLoc: Leg        Body mass index is 36.44 kg/m².    Physical Exam  Vitals and nursing note reviewed.   Constitutional:       Appearance: He is obese.   HENT:      Head: Normocephalic.      Nose: Nose normal.      Mouth/Throat:      Mouth: Mucous membranes are moist.   Eyes:      Conjunctiva/sclera: Conjunctivae normal.   Cardiovascular:      Rate and Rhythm: Normal rate and regular rhythm.      Pulses: Normal pulses.      Heart sounds: Normal heart sounds. No murmur heard.     No gallop.   Pulmonary:      Effort: Pulmonary effort is normal.      Breath sounds: Normal breath sounds.   Musculoskeletal:         General: Normal range of motion.      Cervical back: Normal range of motion. "   Skin:     General: Skin is warm and dry.   Neurological:      General: No focal deficit present.      Mental Status: He is alert and oriented to person, place, and time.   Psychiatric:         Mood and Affect: Mood normal.         Behavior: Behavior normal.         Thought Content: Thought content normal.         Judgment: Judgment normal.            Assessment and Plan     1. Hypertension, essential  Take medications as prescribed. No changes since readings normal    Monitor BP at home, goal BP < or = 140/80, call office if consistently above this range.    Follow low salt DASH diet and exercise.    BMI reviewed.    Go to ED if Headaches, blurred vision, chest pain, or SOB occurs along with elevated readings > or = 160/90    Self care instructions provided in AVS    2. BMI 36.0-36.9,adult  BMI reviewed    3. Obesity (BMI 30-39.9)  BMI reviewed.    Diet and exercise to lose weight.             Follow up if symptoms worsen or fail to improve.

## 2024-04-29 NOTE — PROGRESS NOTES
Chronic Pain-Established (Follow up visit)     Interval History 04/29/24: Patient doesn't feel like the pain has improved despite previous intervention and Lyrica 75 mg b.i.d..  He reports his leg feeling heavy and weak.     Interval History 03/04/24: The patient had a right TFESI at L4/5 and L5/S1 on 02/15/24 and they report 40% pain relief. Patient was offered medications during their last visit and they deferred.  They are not participating in physical therapy and are participating in home exercise plan.    Original HPI: Daniel Garcias is a 48 y.o. year old male patient who has a past medical history of Cervical radiculopathy, Cervical strain, History of repair of ACL - 1994, meniscus 2007, Intractable episodic headache, and Sciatica of right side. He presents in referral from No ref. provider found for radicular pain    Original Pain Description:  The pain is located in the right leg and is radiating to the right foot / plantar surface. The pain is described as aching, numbing, and tingling. Exacerbating factors: Walking. Mitigating factors nothing. Symptoms interfere with daily activity. The patient feels like symptoms have been worsening. Patient reports loss of sensations.  Patient had a MICRODISCECTOMY L5/S1 3/23. Patient reports that the back pain resolved, but that the leg pain radicular symptoms persisted.     Original PAIN SCORES:  Best: Pain is 7  Current: Pain is 9  Worst: Pain is 9        3/4/2024     4:01 PM   Last 3 PDI Scores   Pain Disability Index (PDI) 35       6 weeks of Conservative therapy:  PT:  No  Chiro:  HEP: Not yet    Treatments / Medications: (Ice/Heat/NSAIDS/APAP/etc):  Lyrica 75 - not taking, did not want to take it  Norco - not taking    Antiplatelets/Anticoagulants:  No    Interventional Pain Procedures: (Previous injections)  10/21/22 L5/S1 Right TFESI  03/27/23 L5/S1 Right microdiscectomy  02/15/24 L4/5 & L5/S1 Right TFESI    IMAGING:    MRI LUMBAR SPINE W WO CONTRAST  01/16/2024  L2-3: Circumferential disc bulge and bilateral facet arthropathy.  Mild spinal canal stenosis and mild bilateral neural foraminal narrowing.  L3-4: Circumferential disc bulge with a superimposed left foraminal/extraforaminal disc protrusion which may abut the exiting left L3 nerve root.  Bilateral facet arthropathy and ligamentum flavum buckling.  Moderate spinal canal stenosis as well as severe left and moderate right neural foraminal narrowing.  L4-5: Circumferential disc bulge.  Bilateral facet arthropathy and ligamentum flavum buckling.  Mild spinal canal stenosis as well as moderate bilateral neural foraminal narrowing.  Small synovial cyst superimposed right L4-L5 level contributing to central encroachment  L5-S1: Postoperative change.  Broad-based posterior disc bulge.  Bilateral facet arthropathy.  Mild right neural foraminal narrowing.  No spinal canal stenosis.     Postoperative change of L5-S1 hemilaminectomy and bilateral foraminotomies.     Multilevel degenerative changes of the lumbar spine producing moderate spinal canal stenosis at L1-L2 and L3-L4.  Degenerative findings also contribute to severe left neural foraminal narrowing at L3-4. level by level details as per above.       Past Surgical History:   Procedure Laterality Date    COLONOSCOPY N/A 4/11/2018    Procedure: COLONOSCOPY golytely;  Surgeon: Deborah Gamino MD;  Location: [facility];  Service: Endoscopy;  Laterality: N/A;    EPIDURAL STEROID INJECTION INTO LUMBAR SPINE Right 2/15/2024    Procedure: RT L4-5 and L5-S1 TFESI;  Surgeon: Magdaleno James MD;  Location: [facility] PAIN MANAGEMENT;  Service: Pain Management;  Laterality: Right;  30 mins    KNEE ARTHROSCOPY W/ ACL RECONSTRUCTION      left    KNEE ARTHROSCOPY W/ MENISCAL REPAIR  1998    left    MINIMALLY INVASIVE SURGICAL REMOVAL OF INTERVERTEBRAL DISC OF SPINE USING MICROSCOPE N/A 3/27/2023    Procedure: RIGHT L5-S1 MICRODISCECTOMY,MINIMALLY INVASIVE APPROACH;   Surgeon: Saad Gordon MD;  Location: [facility] OR;  Service: Neurosurgery;  Laterality: N/A;  RIGHT L5-S1 MICRODISCECTOMY,MINIMALLY INVASIVE APPROACH   ANESTHESIA: GENERAL   NERVE MON; EMG, SEP  POSITION: PRONE  BED: MIGUELINA 4 POST/ KATE FRAME  HEAD REST: PRONE VIEW  RADIOLOGY: C-ARM  MISC: MICROSCOPE, CARO    TRANSFORAMINAL EPIDURAL INJECTION OF STEROID Right 10/21/2022    Procedure: LUMBAR TRANSFORAMINAL RIGHT L5/S1 CONTRAST DIRECT REFERRAL;  Surgeon: Simone Prather MD;  [facility];  Service: Pain Management;  Laterality: Right;       Social History     Socioeconomic History    Marital status:     Number of children: 3   Occupational History    Occupation: Operations   Tobacco Use    Smoking status: Never    Smokeless tobacco: Never   Substance and Sexual Activity    Alcohol use: No    Drug use: No    Sexual activity: Yes     Partners: Female       Medications/Allergies: See med card    ROS:  GENERAL: No fever. No chills. No fatigue. Denies weight loss. Denies weight gain.  Back / musculoskeletal / neuro : See HPI    VITALS:   There were no vitals filed for this visit.    There is no height or weight on file to calculate BMI.      3/4/2024     4:01 PM 1/23/2024     1:07 PM   Last 3 PDI Scores   Pain Disability Index (PDI) 35 42       PHYSICAL EXAM:   GENERAL: Well appearing, in no acute distress, alert and oriented x3.  PSYCH:  Mood and affect appropriate.  SKIN: Skin color, texture, turgor normal, no rashes or lesions.  HEENT:  Normocephalic, atraumatic. Cranial nerves grossly intact.  NECK: No pain to palpation over the cervical paraspinous muscles. No pain to palpation over facets. No pain with neck flexion, extension, or lateral flexion.   PULM: No evidence of respiratory difficulty, symmetric chest rise.  GI:  Non-distended  BACK: Limited range of motion. Palpation over the lower lumbar spinous processes. There is no pain with palpation over the sacroiliac joints bilaterally.    EXTREMITIES: No deformities, edema, or skin discoloration.   MUSCULOSKELETAL: Shoulder, hip, and knee provocative maneuvers are negative. No atrophy is noted.  NEURO: Sensation is equal and appropriate bilaterally. Bilateral upper and lower extremity strength is normal and symmetric. Bilateral upper and lower extremity coordination and muscle stretch reflexes are physiologic and symmetric. Plantar response are downgoing. Straight leg raising in the supine position is negative to radicular pain.   GAIT: normal.      LABS:    Lab Results   Component Value Date    HGBA1C 5.3 07/27/2022       Lab Results   Component Value Date    WBC 4.18 01/25/2023    HGB 14.6 01/25/2023    HCT 45.7 01/25/2023    MCV 88 01/25/2023     01/25/2023       ASSESSMENT: 48 y.o. year old male with pain, consistent with:    No diagnosis found.      DISCUSSION: Daniel Garcias is a patient who comes to us with right radiculopathy that is worsening.    PLAN:  - I have stressed the importance of physical activity and a home exercise plan to help with pain and improve health.  - Patient can continue with medications for now since they are providing benefits, using them appropriately, and without side effects.  - Counseled patient regarding the importance of activity modification and constant sleeping habits.  - Refer back to Neurosurgery/spine team for reassessment due to worsening symptoms  - Consider caudal SEAN vs L4/5 SEAN VS repeat L4/5 and L5/S1 right TFESI if his pain persists  - Imaging: Reviewed available imaging with patient and answered any questions they had regarding study.  Will update MRI since his pain is worsening  - The patient's pathophysiology was explained in detail with reference to x-rays, models, other visual aids as appropriate.   - Follow up visit: return to clinic in 4 weeks post procedure      Magdaleno James MD  04/29/2024

## 2024-05-21 ENCOUNTER — HOSPITAL ENCOUNTER (OUTPATIENT)
Dept: RADIOLOGY | Facility: HOSPITAL | Age: 49
Discharge: HOME | End: 2024-05-21
Attending: EMERGENCY MEDICINE
Payer: COMMERCIAL

## 2024-05-21 DIAGNOSIS — M54.9 DORSALGIA, UNSPECIFIED: ICD-10-CM

## 2024-05-21 LAB
CREAT SERPL-MCNC: 1.2 MG/DL (ref 0.5–1.4)
SAMPLE: NORMAL

## 2024-05-21 PROCEDURE — 72158 MRI LUMBAR SPINE W/O & W/DYE: CPT | Mod: 26,,, | Performed by: RADIOLOGY

## 2024-05-21 PROCEDURE — 25500020 PHARM REV CODE 255: Performed by: EMERGENCY MEDICINE

## 2024-05-21 PROCEDURE — A9585 GADOBUTROL INJECTION: HCPCS | Performed by: EMERGENCY MEDICINE

## 2024-05-21 PROCEDURE — 72158 MRI LUMBAR SPINE W/O & W/DYE: CPT | Mod: TC

## 2024-05-21 RX ORDER — GADOBUTROL 604.72 MG/ML
10 INJECTION INTRAVENOUS
Status: COMPLETED | OUTPATIENT
Start: 2024-05-21 | End: 2024-05-21

## 2024-05-21 RX ADMIN — GADOBUTROL 10 ML: 604.72 INJECTION INTRAVENOUS at 08:05

## 2024-05-22 ENCOUNTER — TELEPHONE (OUTPATIENT)
Dept: NEUROSURGERY | Facility: CLINIC | Age: 49
End: 2024-05-22
Payer: COMMERCIAL

## 2024-05-22 NOTE — TELEPHONE ENCOUNTER
Called patient and rescheduled appointment from PAOLA Owens  to Saad Gordon MD   Future Appointments   Date Time Provider Department Center   5/22/2024  2:00 PM Brooke Mcgowan, NP [facility] NEUROS8 [facility]   5/27/2024 10:00 AM Saad Gordon MD [facility]   5/28/2024  1:30 PM Magdaleno James MD [facility] PAINMA [place]   5/30/2024  9:20 AM PRE-ADMIT NURSE 2, ENDO -[facility] ENDOPP4 [facility]   6/18/2024  7:00 AM LAB, APPOINTMENT [facility] LAB IM Roni Broderick PCW    Patient voiced understanding and thanked me.

## 2024-05-27 ENCOUNTER — PATIENT MESSAGE (OUTPATIENT)
Dept: INTERNAL MEDICINE | Facility: CLINIC | Age: 49
End: 2024-05-27
Payer: COMMERCIAL

## 2024-05-27 ENCOUNTER — OFFICE VISIT (OUTPATIENT)
Dept: SPINE | Facility: CLINIC | Age: 49
End: 2024-05-27
Payer: COMMERCIAL

## 2024-05-27 VITALS — HEART RATE: 69 BPM | DIASTOLIC BLOOD PRESSURE: 71 MMHG | SYSTOLIC BLOOD PRESSURE: 128 MMHG

## 2024-05-27 DIAGNOSIS — R20.2 PARESTHESIA OF RIGHT LEG: Primary | ICD-10-CM

## 2024-05-27 PROCEDURE — 99999 PR PBB SHADOW E&M-EST. PATIENT-LVL II: CPT | Mod: PBBFAC,,, | Performed by: NEUROLOGICAL SURGERY

## 2024-05-27 PROCEDURE — 99213 OFFICE O/P EST LOW 20 MIN: CPT | Mod: S$GLB,,, | Performed by: NEUROLOGICAL SURGERY

## 2024-05-27 NOTE — PROGRESS NOTES
"CHIEF COMPLAINT:  Worsening right leg numbness and paraesthesias     I, Betty Vela, attest that this documentation has been prepared under the direction and in the presence of Saad Gordon MD.    HPI from 12/29/2023:  Daniel Garcias is a 47 y.o. male s/p RIGHT L5/S1 microdiscectomy MIS with Dr. Gordon on 3/27/23. He is being seen in clinic today for his 6 week post-op evaluation. States that he is doing well since surgery. He noticed significant improvement in his back pain. Rates his pain as a 0/10. He continues to struggle with right leg pain and tingling. Denies any bowel bladder dysfunction, leg weakness, gait instability, or saddle anesthesia.  The patient is being seen in clinic today to discuss concerns with worsening numbness and pain in the right leg. States that previously the paraesthesias were more intermittent and have become more constant. Reports intermittent back pain. Denies new weakness, b/b dysfunction, saddle anesthesia, or gait instability. Denies left leg symptoms.     Interval Hx:   Today the patient reports that he has been experiencing worsening right leg numbness and paraesthesias. He reports that the sensation goes down the entire leg all the way down to his foot. He states that after an episode of numbness, he feels "prickly" sensations on the bottom of his right foot. He denies any symptoms on his left side. He also has continued back pain/stiffness that he rates a 4/10, but it is not his main concern at this time. He had injections in February that he reports gave him relief for a week or two.     Patient denies any other complaints at this time.    Review of patient's allergies indicates:   Allergen Reactions    Shellfish containing products Hives       Past Medical History:   Diagnosis Date    Cervical radiculopathy 10/29/2019    Cervical strain 4/20/2021    History of repair of ACL - 1994, meniscus 2007 6/24/2019    1994, meniscus 2007    Intractable episodic headache " 4/22/2019    Sciatica of right side 4/23/2019     Past Surgical History:   Procedure Laterality Date    COLONOSCOPY N/A 4/11/2018    Procedure: COLONOSCOPY golytely;  Surgeon: Deborah Gamino MD;  Location: [facility] ENDO;  Service: Endoscopy;  Laterality: N/A;    EPIDURAL STEROID INJECTION INTO LUMBAR SPINE Right 2/15/2024    Procedure: RT L4-5 and L5-S1 TFESI;  Surgeon: Magdaleno James MD;  Location: [facility] PAIN MA[facility]GEMENT;  Service: Pain Management;  Laterality: Right;  30 mins    KNEE ARTHROSCOPY W/ ACL RECONSTRUCTION      left    KNEE ARTHROSCOPY W/ MENISCAL REPAIR  1998    left    MINIMALLY INVASIVE SURGICAL REMOVAL OF INTERVERTEBRAL DISC OF SPINE USING MICROSCOPE N/A 3/27/2023    Procedure: RIGHT L5-S1 MICRODISCECTOMY,MINIMALLY INVASIVE APPROACH;  Surgeon: Saad Gordon MD;  Location: [facility] OR;  Service: Neurosurgery;  Laterality: N/A;  RIGHT L5-S1 MICRODISCECTOMY,MINIMALLY INVASIVE APPROACH   ANESTHESIA: GENERAL   NERVE MON; EMG, SEP  POSITION: PRONE  BED: [address]/ [place]ATE FRAME  HEAD REST: PRONE VIEW  RADIOLOGY: C-ARM  MISC: MICROSCOPE, CARO    TRANSFORAMINAL EPIDURAL INJECTION OF STEROID Right 10/21/2022    Procedure: LUMBAR TRANSFORAMINAL RIGHT L5/S1 CONTRAST DIRECT REFERRAL;  Surgeon: Simone Prather MD;  Location: [facility] PAIN MGT;  Service: Pain Management;  Laterality: Right;     Family History   Problem Relation Name Age of Onset    Hypertension Mother      Hyperlipidemia Father      Cancer Maternal Grandmother          colon     Social History     Tobacco Use    Smoking status: Never    Smokeless tobacco: Never   Substance Use Topics    Alcohol use: No    Drug use: No        Review of Systems   Musculoskeletal:  Positive for back pain.   Neurological:  Positive for numbness.        Paraesthesias in right leg.   All other systems reviewed and are negative.      OBJECTIVE:   Vital Signs:       Physical Exam:    Vital signs: All nursing notes and vital signs reviewed -- afebrile, vital signs  stable.  Constitutional: Patient sitting comfortably in chair. Appears well developed and well nourished.  Skin: Exposed areas are intact without abnormal markings, rashes or other lesions.  HEENT: Normocephalic. Normal conjunctivae.  Cardiovascular: Normal rate and regular rhythm.  Respiratory: Chest wall rises and falls symmetrically, without signs of respiratory distress.  Abdomen: Soft and non-tender.  Extremities: Warm and without edema. Calves supple, non-tender.  Psych/Behavior: Normal affect.    Neurological:    Mental status: Alert and oriented. Conversational and appropriate.       Cranial Nerves: VFF to confrontation. PERRL. EOMI without nystagmus. Facial STLT normal and symmetric. Strong, symmetric muscles of mastication. Facial strength full and symmetric. Hearing equal bilaterally to finger rub. Palate and uvula rise and fall normally in midline. Shoulder shrug 5/5 strength. Tongue midline.     Motor:    Upper:  Deltoids Triceps Biceps WE WF     R 5/5 5/5 5/5 5/5 5/5 5/5    L 5/5 5/5 5/5 5/5 5/5 5/5      Lower:  HF KE KF DF PF EHL    R 5/5 5/5 5/5 5/5 5/5 5/5    L 5/5 5/5 5/5 5/5 5/5 5/5     Sensory: Intact sensation to light touch in all extremities. Romberg negative.    Reflexes:          DTR: 2+ symmetrically throughout.     Pedro's: Negative.     Babinski's: Negative.     Clonus: Negative.    Cerebellar: Finger-to-nose and rapid alternating movements normal. Gait stable, fluid.    Spine:    Posture: Head well aligned over pelvis in front and side views.  No focal or global spinal deformity visible on inspection. Shoulders and hips even. No obvious leg length discrepancy. No scapula winging.    Bending: Full ROM with forward, back and lateral bending. No rib prominence with forward bend.    Cervical:      ROM: Full with flexion, extension, lateral rotation and ear-to-shoulder bend.      Midline TTP: Negative.     Spurling's test: Negative.     Lhermitte's: Negative.    Thoracic:     Midline  TTP: Negative    Lumbar:     Midline TTP: Negative     Straight Leg Test: Negative     Crossed Straight Leg Test: Negative     Sciatic notch tenderness: Negative.    Other:     SI joint TTP: Negative.     Greater trochanter TTP: Negative.     Tenderness with external/internal hip rotation: Negative.    Diagnostic Results:  All imaging was independently reviewed by me.    MRI lumbar spine, dated 5/21/2024:  1. Foraminal stenosis bilaterally at L4-5   2. Mild foraminal stenosis at right L5-S1.     Flex/Ex X-ray lumbar spine, dated 1/16/2024:  1. No instability.     ASSESSMENT/PLAN:     Daniel Garcias has right leg parasthesias in a nondermatomal pattern affecting the right leg and foot. These predate surgery but are getting worse. His MRI shows improvement at the L5-S1 operative level after foraminotomies but he has severe stenosis at L4-5 and multilevel degenerative stenosis. He got some relief from L4-5 and L5-S1 injections recently but I recommend a repeat L4-5 injection only to confirm this is symptomatic level. Also I'd like an EMG to assess chronicity.    The patient understands and agrees with the plan of care. All questions were answered.     1. EMG BLE  2. L4-5 SEAN  3. RTC within 3 months pending #1 and #2    I, Dr. Saad Gordon personally performed the services described in this documentation. All medical record entries made by the scribe, Betty Vela, were at my direction and in my presence. I have reviewed the chart and agree that the record reflects my personal performance and is accurate and complete.      Saad Gordon M.D.  Department of Neurosurgery  [facility]

## 2024-05-27 NOTE — H&P (VIEW-ONLY)
"CHIEF COMPLAINT:  Worsening right leg numbness and paraesthesias     I, Betty Vela, attest that this documentation has been prepared under the direction and in the presence of Saad Gordon MD.    HPI from 12/29/2023:  Daniel Garcias is a 47 y.o. male s/p RIGHT L5/S1 microdiscectomy MIS with Dr. Gordon on 3/27/23. He is being seen in clinic today for his 6 week post-op evaluation. States that he is doing well since surgery. He noticed significant improvement in his back pain. Rates his pain as a 0/10. He continues to struggle with right leg pain and tingling. Denies any bowel bladder dysfunction, leg weakness, gait instability, or saddle anesthesia.  The patient is being seen in clinic today to discuss concerns with worsening numbness and pain in the right leg. States that previously the paraesthesias were more intermittent and have become more constant. Reports intermittent back pain. Denies new weakness, b/b dysfunction, saddle anesthesia, or gait instability. Denies left leg symptoms.     Interval Hx:   Today the patient reports that he has been experiencing worsening right leg numbness and paraesthesias. He reports that the sensation goes down the entire leg all the way down to his foot. He states that after an episode of numbness, he feels "prickly" sensations on the bottom of his right foot. He denies any symptoms on his left side. He also has continued back pain/stiffness that he rates a 4/10, but it is not his main concern at this time. He had injections in February that he reports gave him relief for a week or two.     Patient denies any other complaints at this time.    Review of patient's allergies indicates:   Allergen Reactions    Shellfish containing products Hives       Past Medical History:   Diagnosis Date    Cervical radiculopathy 10/29/2019    Cervical strain 4/20/2021    History of repair of ACL - 1994, meniscus 2007 6/24/2019    1994, meniscus 2007    Intractable episodic headache " 4/22/2019    Sciatica of right side 4/23/2019     Past Surgical History:   Procedure Laterality Date    COLONOSCOPY N/A 4/11/2018    Procedure: COLONOSCOPY golytely;  Surgeon: Deborah Gamino MD;  Location: [facility] ENDO;  Service: Endoscopy;  Laterality: N/A;    EPIDURAL STEROID INJECTION INTO LUMBAR SPINE Right 2/15/2024    Procedure: RT L4-5 and L5-S1 TFESI;  Surgeon: Magdaleno James MD;  Location: [facility] PAIN MANAGEMENT;  Service: Pain Management;  Laterality: Right;  30 mins    KNEE ARTHROSCOPY W/ ACL RECONSTRUCTION      left    KNEE ARTHROSCOPY W/ MENISCAL REPAIR  1998    left    MINIMALLY INVASIVE SURGICAL REMOVAL OF INTERVERTEBRAL DISC OF SPINE USING MICROSCOPE N/A 3/27/2023    Procedure: RIGHT L5-S1 MICRODISCECTOMY,MINIMALLY INVASIVE APPROACH;  Surgeon: Saad Gordon MD;  Location: [facility] OR;  Service: Neurosurgery;  Laterality: N/A;  RIGHT L5-S1 MICRODISCECTOMY,MINIMALLY INVASIVE APPROACH   ANESTHESIA: GENERAL   NERVE MON; EMG, SEP  POSITION: PRONE  BED: [address]/ [place] FRAME  HEAD REST: PRONE VIEW  RADIOLOGY: C-ARM  MISC: MICROSCOPE, CARO    TRANSFORAMINAL EPIDURAL INJECTION OF STEROID Right 10/21/2022    Procedure: LUMBAR TRANSFORAMINAL RIGHT L5/S1 CONTRAST DIRECT REFERRAL;  Surgeon: Simone Prather MD;  Location: [facility] PAIN MGT;  Service: Pain Management;  Laterality: Right;     Family History   Problem Relation Name Age of Onset    Hypertension Mother      Hyperlipidemia Father      Cancer Maternal Grandmother          colon     Social History     Tobacco Use    Smoking status: Never    Smokeless tobacco: Never   Substance Use Topics    Alcohol use: No    Drug use: No        Review of Systems   Musculoskeletal:  Positive for back pain.   Neurological:  Positive for numbness.        Paraesthesias in right leg.   All other systems reviewed and are negative.      OBJECTIVE:   Vital Signs:       Physical Exam:    Vital signs: All nursing notes and vital signs reviewed -- afebrile, vital signs  stable.  Constitutional: Patient sitting comfortably in chair. Appears well developed and well nourished.  Skin: Exposed areas are intact without abnormal markings, rashes or other lesions.  HEENT: Normocephalic. Normal conjunctivae.  Cardiovascular: Normal rate and regular rhythm.  Respiratory: Chest wall rises and falls symmetrically, without signs of respiratory distress.  Abdomen: Soft and non-tender.  Extremities: Warm and without edema. Calves supple, non-tender.  Psych/Behavior: Normal affect.    Neurological:    Mental status: Alert and oriented. Conversational and appropriate.       Cranial Nerves: VFF to confrontation. PERRL. EOMI without nystagmus. Facial STLT normal and symmetric. Strong, symmetric muscles of mastication. Facial strength full and symmetric. Hearing equal bilaterally to finger rub. Palate and uvula rise and fall normally in midline. Shoulder shrug 5/5 strength. Tongue midline.     Motor:    Upper:  Deltoids Triceps Biceps WE WF     R 5/5 5/5 5/5 5/5 5/5 5/5    L 5/5 5/5 5/5 5/5 5/5 5/5      Lower:  HF KE KF DF PF EHL    R 5/5 5/5 5/5 5/5 5/5 5/5    L 5/5 5/5 5/5 5/5 5/5 5/5     Sensory: Intact sensation to light touch in all extremities. Romberg negative.    Reflexes:          DTR: 2+ symmetrically throughout.     Pedro's: Negative.     Babinski's: Negative.     Clonus: Negative.    Cerebellar: Finger-to-nose and rapid alternating movements normal. Gait stable, fluid.    Spine:    Posture: Head well aligned over pelvis in front and side views.  No focal or global spinal deformity visible on inspection. Shoulders and hips even. No obvious leg length discrepancy. No scapula winging.    Bending: Full ROM with forward, back and lateral bending. No rib prominence with forward bend.    Cervical:      ROM: Full with flexion, extension, lateral rotation and ear-to-shoulder bend.      Midline TTP: Negative.     Spurling's test: Negative.     Lhermitte's: Negative.    Thoracic:     Midline  TTP: Negative    Lumbar:     Midline TTP: Negative     Straight Leg Test: Negative     Crossed Straight Leg Test: Negative     Sciatic notch tenderness: Negative.    Other:     SI joint TTP: Negative.     Greater trochanter TTP: Negative.     Tenderness with external/internal hip rotation: Negative.    Diagnostic Results:  All imaging was independently reviewed by me.    MRI lumbar spine, dated 5/21/2024:  1. Foraminal stenosis bilaterally at L4-5   2. Mild foraminal stenosis at right L5-S1.     Flex/Ex X-ray lumbar spine, dated 1/16/2024:  1. No instability.     ASSESSMENT/PLAN:     Daniel Garcias has right leg parasthesias in a nondermatomal pattern affecting the right leg and foot. These predate surgery but are getting worse. His MRI shows improvement at the L5-S1 operative level after foraminotomies but he has severe stenosis at L4-5 and multilevel degenerative stenosis. He got some relief from L4-5 and L5-S1 injections recently but I recommend a repeat L4-5 injection only to confirm this is symptomatic level. Also I'd like an EMG to assess chronicity.    The patient understands and agrees with the plan of care. All questions were answered.     1. EMG BLE  2. L4-5 SEAN  3. RTC within 3 months pending #1 and #2    I, Dr. Saad Gordon personally performed the services described in this documentation. All medical record entries made by the scribe, Betty Vela, were at my direction and in my presence. I have reviewed the chart and agree that the record reflects my personal performance and is accurate and complete.      Saad Gordon M.D.  Department of Neurosurgery  [facility]

## 2024-05-28 ENCOUNTER — OFFICE VISIT (OUTPATIENT)
Dept: PAIN MEDICINE | Facility: CLINIC | Age: 49
End: 2024-05-28
Payer: COMMERCIAL

## 2024-05-28 ENCOUNTER — TELEPHONE (OUTPATIENT)
Dept: PAIN MEDICINE | Facility: CLINIC | Age: 49
End: 2024-05-28

## 2024-05-28 DIAGNOSIS — M51.37 DDD (DEGENERATIVE DISC DISEASE), LUMBOSACRAL: ICD-10-CM

## 2024-05-28 DIAGNOSIS — M54.17 LUMBOSACRAL RADICULOPATHY: Primary | ICD-10-CM

## 2024-05-28 PROCEDURE — 99214 OFFICE O/P EST MOD 30 MIN: CPT | Mod: 95,,, | Performed by: EMERGENCY MEDICINE

## 2024-05-28 NOTE — PROGRESS NOTES
"Chronic Pain-Tele-Medicine-Established Note (Follow up visit)      The patient location is: Home  The chief complaint leading to consultation is: Follow up  Visit type: Virtual visit with synchronous audio and video  Total time spent with patient: 20  Each patient to whom he or she provides medical services by telemedicine is:  (1) informed of the relationship between the physician and patient and the respective role of any other health care provider with respect to management of the patient; and (2) notified that he or she may decline to receive medical services by telemedicine and may withdraw from such care at any time.    Interval History 5/20/24: Patient reports that his pain is 4-5/10. He reports that he did have the "thunder" and pins/needles in his right leg, but that it subsided and lingered in his right foot. He saw Neurosurgery yesterday as well.     Interval History 04/29/24: Patient doesn't feel like the pain has improved despite previous intervention and Lyrica 75 mg b.i.d..  He reports his leg feeling heavy and weak.     Interval History 03/04/24: The patient had a right TFESI at L4/5 and L5/S1 on 02/15/24 and they report 40% pain relief. Patient was offered medications during their last visit and they deferred.  They are not participating in physical therapy and are participating in home exercise plan.    Original HPI: Daniel Garcias is a 48 y.o. year old male patient who has a past medical history of Cervical radiculopathy, Cervical strain, History of repair of ACL - 1994, meniscus 2007, Intractable episodic headache, and Sciatica of right side. He presents in referral from No ref. provider found for radicular pain    Original Pain Description:  The pain is located in the right leg and is radiating to the right foot / plantar surface. The pain is described as aching, numbing, and tingling. Exacerbating factors: Walking. Mitigating factors nothing. Symptoms interfere with daily activity. The " patient feels like symptoms have been worsening. Patient reports loss of sensations.  Patient had a MICRODISCECTOMY L5/S1 3/23. Patient reports that the back pain resolved, but that the leg pain radicular symptoms persisted.     Original PAIN SCORES:  Best: Pain is 7  Current: Pain is 9  Worst: Pain is 9        4/29/2024     1:44 PM   Last 3 PDI Scores   Pain Disability Index (PDI) 56       6 weeks of Conservative therapy:  PT:  No  Chiro:  HEP: Not yet    Treatments / Medications: (Ice/Heat/NSAIDS/APAP/etc):  Lyrica 75 - not taking, did not help  Christ - not helpful  Norco - not taking    Antiplatelets/Anticoagulants:  No    Interventional Pain Procedures: (Previous injections)  02/15/24 L4/5 & L5/S1 Right TFESI  03/27/23 L5/S1 Right microdiscectomy  10/21/22 L5/S1 Right TFESI    IMAGING:    MRI LUMBAR SPINE W WO CONTRAST 01/16/2024  L2-3: Circumferential disc bulge and bilateral facet arthropathy.  Mild spinal canal stenosis and mild bilateral neural foraminal narrowing.  L3-4: Circumferential disc bulge with a superimposed left foraminal/extraforaminal disc protrusion which may abut the exiting left L3 nerve root.  Bilateral facet arthropathy and ligamentum flavum buckling.  Moderate spinal canal stenosis as well as severe left and moderate right neural foraminal narrowing.  L4-5: Circumferential disc bulge.  Bilateral facet arthropathy and ligamentum flavum buckling.  Mild spinal canal stenosis as well as moderate bilateral neural foraminal narrowing.  Small synovial cyst superimposed right L4-L5 level contributing to central encroachment  L5-S1: Postoperative change.  Broad-based posterior disc bulge.  Bilateral facet arthropathy.  Mild right neural foraminal narrowing.  No spinal canal stenosis.  Postoperative change of L5-S1 hemilaminectomy and bilateral foraminotomies.     MRI LUMBAR SPINE W WO CONTRAST  05/21/2024  T11-T12: Bilateral facet arthropathy.    T12-L1: Diffuse disc bulge and bilateral facet  arthropathy.    L1-L2: Diffuse DB, BL facet arthropathy and LF buckling=>mild-to-mod canal and lateral recess stenosis and mild right neural foraminal narrowing.  L2-L3: Diffuse DB, BL facet arthropathy with joint effusions and LF buckling=> mild  canal and lateral recess stenosis and mild-mod left and mild right foraminal narrowing.  L3-L4: Diffuse DB w/ left foraminal/extraforaminal protrusion, BL facet arthropathy + LF bucklin=>mod canal and lateral recess stenosis and mild right and severe left neural foraminal narrowing w/impingement of exiting L3 NR  L4-L5: Diffuse DB w/subarticular protrusion, BL facet arthropathy + LF buckling=>mild canal and mod right lateral recess stenosis and mod-severe BL neural foraminal narrowing.  L5-S1: Diffuse DB->right descending S1 NR and BL facet arthropathy=>mod right neural foraminal narrowing.     Past Surgical History:   Procedure Laterality Date    COLONOSCOPY N/A 4/11/2018    Procedure: COLONOSCOPY golytely;  Surgeon: Deborah Gamino MD;  Location: [facility] ENDO;  Service: Endoscopy;  Laterality: N/A;    EPIDURAL STEROID INJECTION INTO LUMBAR SPINE Right 2/15/2024    Procedure: RT L4-5 and L5-S1 TFESI;  Surgeon: Magdaleno James MD;  Location: [facility] PAIN MANAGEMENT;  Service: Pain Management;  Laterality: Right;  30 mins    KNEE ARTHROSCOPY W/ ACL RECONSTRUCTION      left    KNEE ARTHROSCOPY W/ MENISCAL REPAIR  1998    left    MINIMALLY INVASIVE SURGICAL REMOVAL OF INTERVERTEBRAL DISC OF SPINE USING MICROSCOPE N/A 3/27/2023    Procedure: RIGHT L5-S1 MICRODISCECTOMY,MINIMALLY INVASIVE APPROACH;  Surgeon: Saad Gordon MD;  Location: [facility] OR;  Service: Neurosurgery;  Laterality: N/A;  RIGHT L5-S1 MICRODISCECTOMY,MINIMALLY INVASIVE APPROACH   ANESTHESIA: GENERAL   NERVE MON; EMG, SEP  POSITION: PRONE  BED: [place] [address] POST/ KATE FRAME  HEAD REST: PRONE VIEW  RADIOLOGY: C-ARM  MISC: MICROSCOPE, CARO    TRANSFORAMINAL EPIDURAL INJECTION OF STEROID Right 10/21/2022     Procedure: LUMBAR TRANSFORAMINAL RIGHT L5/S1 CONTRAST DIRECT REFERRAL;  Surgeon: Simone Prather MD;  Location: [facility] PAIN MGT;  Service: Pain Management;  Laterality: Right;       Social History     Socioeconomic History    Marital status:     Number of children: 3   Occupational History    Occupation: Operations   Tobacco Use    Smoking status: Never    Smokeless tobacco: Never   Substance and Sexual Activity    Alcohol use: No    Drug use: No    Sexual activity: Yes     Partners: Female       Medications/Allergies: See med card    ROS:  GENERAL: No fever. No chills. No fatigue. Denies weight loss. Denies weight gain.  Back / musculoskeletal / neuro : See HPI    VITALS:   There were no vitals filed for this visit.    There is no height or weight on file to calculate BMI.      4/29/2024     1:44 PM 3/4/2024     4:01 PM 1/23/2024     1:07 PM   Last 3 PDI Scores   Pain Disability Index (PDI) 56 35 42       Physical Exam:  General appearance: Well appearing, in no acute distress, alert and oriented x3.  Psych:  Mood and affect appropriate.    PHYSICAL EXAM:   GENERAL: Well appearing, in no acute distress, alert and oriented x3.  PSYCH:  Mood and affect appropriate.  SKIN: Skin color, texture, turgor normal, no rashes or lesions.  HEENT:  Normocephalic, atraumatic. Cranial nerves grossly intact.  NECK: No pain to palpation over the cervical paraspinous muscles. No pain to palpation over facets. No pain with neck flexion, extension, or lateral flexion.   PULM: No evidence of respiratory difficulty, symmetric chest rise.  GI:  Non-distended  BACK: Limited range of motion. Palpation over the lower lumbar spinous processes. There is no pain with palpation over the sacroiliac joints bilaterally.   EXTREMITIES: No deformities, edema, or skin discoloration.   MUSCULOSKELETAL: Shoulder, hip, and knee provocative maneuvers are negative. No atrophy is noted.  NEURO: Sensation is equal and appropriate bilaterally.  Bilateral upper and lower extremity strength is normal and symmetric. Bilateral upper and lower extremity coordination and muscle stretch reflexes are physiologic and symmetric. Plantar response are downgoing. Straight leg raising in the supine position is negative to radicular pain.   GAIT: normal.      LABS:    Lab Results   Component Value Date    HGBA1C 5.3 07/27/2022       Lab Results   Component Value Date    WBC 4.18 01/25/2023    HGB 14.6 01/25/2023    HCT 45.7 01/25/2023    MCV 88 01/25/2023     01/25/2023       ASSESSMENT: 48 y.o. year old male with pain, consistent with:    Encounter Diagnoses   Name Primary?    Lumbosacral radiculopathy Yes    DDD (degenerative disc disease), lumbosacral          DISCUSSION: Daniel Garcias is a patient who comes to us with right radiculopathy that is worsening.  Patient was evaluated by Neurosurgery who recommended a repeat L4-5 injection to confirm this is the symptomatic level.     PLAN:  - I have stressed the importance of physical activity and a home exercise plan to help with pain and improve health.  - Patient can continue with medications for now since they are providing benefits, using them appropriately, and without side effects.  - Counseled patient regarding the importance of activity modification and constant sleeping habits.  - Will order L4/5 SEAN  - EMG is pending  - Imaging: Reviewed available imaging with patient and answered any questions they had regarding study.    - The patient's pathophysiology was explained in detail with reference to x-rays, models, other visual aids as appropriate.   - Follow up visit: return to clinic in 4 weeks post procedure      Magdaleno James MD  05/28/2024

## 2024-05-28 NOTE — TELEPHONE ENCOUNTER
----- Message from Magdaleno James MD sent at 2024  1:34 PM CDT -----  Regarding: Order for BLOSSOM ALCANTARA    Patient Name: BLOSSOM ALCANTARA([id number])  Sex: Male  : 1975      PCP: JESSIKA VAZ    Center: [facility]     Types of orders made on 2024: Procedure Request    Order Date:2024  Ordering User:MAGDALENO JAMES [[id number]]  Encounter Provider:Magdaleno James MD [[id number]]  Authorizi  ng Provider: Magdaleno James MD [[id number]]  Department:[facility] PAIN MANAGEMENT[[id number]]    Common Order Information  Procedure -> Epidural Injection (specify level) Cmt: L4/5    Order Specific Information  Order: Procedure Order to Pain Management [Custom: [id number]]  Order #:          [id number]: 1 FUTURE    Priority: Routine  Class: Clinic Performed    Future Order Information      Expires on:            Expected by:2024                   Comment:RN IV    Associated Diagnoses      M54.17 Lumbosacral radiculopathy      M51.37 DDD (degenerative disc disease), lumbosacral      Physician -> rasheeda         Facility Name: -> [facility]           Priority: Routine  Class: Clinic Performed    Future Order Information      Expires on:2025            Expected by:2024                     Comment:RN IV    Associated Diagnoses      M54.17 Lumbosacral radiculopathy      M51.37 DDD (degenerative disc disease), lumbosacral      Procedure -> Epidural Injection (specify level) Cmt: L4/5        Physician -> rasheeda         Facility Name: -> [facility]

## 2024-05-30 ENCOUNTER — TELEPHONE (OUTPATIENT)
Dept: ENDOSCOPY | Facility: HOSPITAL | Age: 49
End: 2024-05-30

## 2024-05-30 ENCOUNTER — CLINICAL SUPPORT (OUTPATIENT)
Dept: ENDOSCOPY | Facility: HOSPITAL | Age: 49
End: 2024-05-30
Attending: FAMILY MEDICINE
Payer: COMMERCIAL

## 2024-05-30 VITALS — BODY MASS INDEX: 34.82 KG/M2 | WEIGHT: 280 LBS | HEIGHT: 75 IN

## 2024-05-30 DIAGNOSIS — Z12.11 COLON CANCER SCREENING: ICD-10-CM

## 2024-05-30 DIAGNOSIS — Z12.11 SPECIAL SCREENING FOR MALIGNANT NEOPLASMS, COLON: Primary | ICD-10-CM

## 2024-05-30 NOTE — TELEPHONE ENCOUNTER
Spoke to patient to schedule procedure(s) Colonoscopy       Physician to perform procedure(s) Dr. MARIIA Tatum  Date of Procedure (s) 8/14/24  Arrival Time 8:45 AM  Time of Procedure(s) 9:45 AM   Location of Procedure(s) [facility] 2nd Floor  Type of Rx Prep sent to patient: PEG  Instructions provided to patient via [facility]    Patient was informed on the following information and verbalized understanding. Screening questionnaire reviewed with patient and complete. If procedure requires anesthesia, a responsible adult needs to be present to accompany the patient home, patient cannot drive after receiving anesthesia. Appointment details are tentative, especially check-in time. Patient will receive a prep-op call 7 days prior to confirm check-in time for procedure. If applicable the patient should contact their pharmacy to verify Rx for procedure prep is ready for pick-up. Patient was advised to call the scheduling department at [phone] if pharmacy states no Rx is available. Patient was advised to call the endoscopy scheduling department if any questions or concerns arise.      [facility] Scheduling Department

## 2024-06-10 ENCOUNTER — PATIENT MESSAGE (OUTPATIENT)
Dept: INTERNAL MEDICINE | Facility: CLINIC | Age: 49
End: 2024-06-10
Payer: COMMERCIAL

## 2024-06-10 ENCOUNTER — TELEPHONE (OUTPATIENT)
Dept: PAIN MEDICINE | Facility: CLINIC | Age: 49
End: 2024-06-10
Payer: COMMERCIAL

## 2024-06-13 NOTE — PRE-PROCEDURE INSTRUCTIONS
Patient reviewed on 6/12/2024.  Okay to proceed at [facility]. The following pre-procedure instructions and arrival time have been reviewed with patient via phone and sent to patient portal for review.  Patient verbalized an understanding.  Pt to be accompanied by his daughter day of procedure as responsible .    Dear Daniel,     You are scheduled for a procedure with Dr. James on 6/17/2024.     Your scheduled arrival time is 11:30 am.  This arrival time is roughly 1 hour before your anticipated procedure time to allow sufficient time for pre-op..       Please wear comfortable clothes.  Most patients do not need to change into a gown.  Please do not wear a dress.  This procedure will take place at the [facility] at the corner of [address] [place] and [facility] [address].  It is in the [place] [facility] next to [facility].  The address is:     [address] [place] [address].  [place] [id number]     After entering the building, you will proceed to the second floor where you can check in with registration. You should take any medications that you routinely take for blood pressure, heart medications, thyroid, cholesterol, etc.      The fasting restrictions are dependent on whether or not you are receiving sedation.  Sedation is not available for all procedures.      Your fasting instructions are as follow:  IV sedation. You should not eat for 8 hours and can only drink clear liquids (water or black coffee without cream/sugar) up until 2 hours before your scheduled time.  You CANNOT drive yourself and must have a .     If you are on blood thinners, you need to follow the anticoagulation instructions that had been discussed previously.  You should only stop the blood thinners if it was approved by your primary care physician or your cardiologist.  In the event that you are not able to stop your blood thinners, a blood thinner was not listed on your medication list, or we were not able to get  clearance from your cardiologist, then the procedure may have to be postponed/canceled.      IF you were told to stop your blood thinners, this is how long you should generally hold some of the more common ones.  Remember that stopping blood thinners is only necessary for certain procedures. If you are unsure of your instructions, please call us.   Aspirin - 5 days  Plavix/Clopidogrel - 7 days  Warfarin / Coumadin - 5 days  Eliquis - 3 days  Pradaxa/Dabigatran - 4 days  Xarelto/Rivaroxaban - 3 days     If you are a diabetic, do not take your medication if you will be fasting, but bring it with you. Please plan on being here for roughly 2 hours.     Please call us if you have been sick (running fever, having any flu-like symptoms) or have been taking antibiotics in the past 2 weeks or had any outpatient procedures other than with us (colonoscopy, endoscopy, OBGYN, dental, etc.). If you have been previously COVID positive, you will need to hold off on your procedure until you are symptom free for 10 days. If you did not have any symptoms, you can have your procedure 10 days from your positive test result.       *HOLD ALL VITAMINS, MINERALS, HERBS (INCLUDING HERBAL TEAS) AND SUPPLEMENTS  *SHOWER WITH ANTIBACTERIAL SOAP (EX. DIAL) NIGHT BEFORE AND MORNING OF PROCEDURE  *DO NOT APPLY ANY LOTIONS, OILS, POWDERS, PERFUME/COLOGNE, OINTMENTS, GELS, CREAMS, MAKEUP OR DEODORANT TO YOUR SKIN MORNING OF PROCEDURE  *LEAVE JEWELRY AND ANY VALUABLES AT HOME  *WEAR LOOSE COMFORTABLE CLOTHING (PREFERABLY A BUTTON UP SHIRT)        Thank you,  [facility] &  Mary, LPN [facility]  Pre-Admit

## 2024-06-17 ENCOUNTER — HOSPITAL ENCOUNTER (OUTPATIENT)
Facility: HOSPITAL | Age: 49
Discharge: HOME | End: 2024-06-17
Attending: EMERGENCY MEDICINE | Admitting: EMERGENCY MEDICINE
Payer: COMMERCIAL

## 2024-06-17 VITALS
TEMPERATURE: 98 F | DIASTOLIC BLOOD PRESSURE: 101 MMHG | HEIGHT: 74 IN | BODY MASS INDEX: 35.94 KG/M2 | SYSTOLIC BLOOD PRESSURE: 166 MMHG | RESPIRATION RATE: 18 BRPM | HEART RATE: 58 BPM | OXYGEN SATURATION: 100 % | WEIGHT: 280 LBS

## 2024-06-17 DIAGNOSIS — G89.29 CHRONIC PAIN: ICD-10-CM

## 2024-06-17 PROCEDURE — 25500020 PHARM REV CODE 255: Performed by: EMERGENCY MEDICINE

## 2024-06-17 PROCEDURE — 62323 NJX INTERLAMINAR LMBR/SAC: CPT | Mod: ,,, | Performed by: EMERGENCY MEDICINE

## 2024-06-17 PROCEDURE — 62323 NJX INTERLAMINAR LMBR/SAC: CPT | Performed by: EMERGENCY MEDICINE

## 2024-06-17 PROCEDURE — 25000003 PHARM REV CODE 250: Performed by: EMERGENCY MEDICINE

## 2024-06-17 PROCEDURE — 99152 MOD SED SAME PHYS/QHP 5/>YRS: CPT | Mod: ,,, | Performed by: EMERGENCY MEDICINE

## 2024-06-17 PROCEDURE — 99152 MOD SED SAME PHYS/QHP 5/>YRS: CPT | Performed by: EMERGENCY MEDICINE

## 2024-06-17 PROCEDURE — 63600175 PHARM REV CODE 636 W HCPCS: Performed by: EMERGENCY MEDICINE

## 2024-06-17 RX ORDER — MIDAZOLAM HYDROCHLORIDE 1 MG/ML
INJECTION INTRAMUSCULAR; INTRAVENOUS
Status: DISCONTINUED | OUTPATIENT
Start: 2024-06-17 | End: 2024-06-17 | Stop reason: HOSPADM

## 2024-06-17 RX ORDER — LIDOCAINE HYDROCHLORIDE 20 MG/ML
INJECTION, SOLUTION EPIDURAL; INFILTRATION; INTRACAUDAL; PERINEURAL
Status: DISCONTINUED | OUTPATIENT
Start: 2024-06-17 | End: 2024-06-17 | Stop reason: HOSPADM

## 2024-06-17 RX ORDER — DEXAMETHASONE SODIUM PHOSPHATE 10 MG/ML
INJECTION INTRAMUSCULAR; INTRAVENOUS
Status: DISCONTINUED | OUTPATIENT
Start: 2024-06-17 | End: 2024-06-17 | Stop reason: HOSPADM

## 2024-06-17 NOTE — DISCHARGE SUMMARY
Discharge Note  Short Stay      SUMMARY     Admit Date: 6/17/2024    Attending Physician: Magdaleno James      Discharge Physician: Magdaleno James      Discharge Date: 6/17/2024 2:26 PM    Procedure(s) (LRB):  SEAN L4/5 (N/A)    Final Diagnosis: Lumbosacral radiculopathy [M54.17]  DDD (degenerative disc disease), lumbosacral [M51.37]    Disposition: Home or self care    Patient Instructions:   Current Discharge Medication List        CONTINUE these medications which have NOT CHANGED    Details   amLODIPine (NORVASC) 5 MG tablet Take 1 tablet (5 mg total) by mouth once daily.  Qty: 90 tablet, Refills: 1    Associated Diagnoses: Hypertension, essential      atorvastatin (LIPITOR) 20 MG tablet Take 1 tablet (20 mg total) by mouth once daily.  Qty: 90 tablet, Refills: 1    Associated Diagnoses: Hyperlipidemia, unspecified hyperlipidemia type      multivitamin (THERAGRAN) per tablet Take 1 tablet by mouth once daily.      sildenafiL (VIAGRA) 100 MG tablet Take 1 tablet (100 mg total) by mouth daily as needed for Erectile Dysfunction.  Qty: 30 tablet, Refills: 2    Associated Diagnoses: Erectile dysfunction, unspecified erectile dysfunction type                 Discharge Diagnosis: Lumbosacral radiculopathy [M54.17]  DDD (degenerative disc disease), lumbosacral [M51.37]  Condition on Discharge: Stable with no complications to procedure   Diet on Discharge: Same as before.  Activity: as per instruction sheet.  Discharge to: Home with a responsible adult.  Follow up: 2-4 weeks       Please call my office or pager at [phone] if experienced any weakness or loss of sensation, fever > 101.5, pain uncontrolled with oral medications, persistent nausea/vomiting/or diarrhea, redness or drainage from the incisions, or any other worrisome concerns. If physician on call was not reached or could not communicate with our office for any reason please go to the nearest emergency department

## 2024-06-17 NOTE — OP NOTE
Lumbar Interlaminar Epidural Steroid Injection under Fluoroscopic Guidance    The procedure, risks, benefits, and options were discussed with the patient. There are no contraindications to the procedure. The patent expressed understanding and agreed to the procedure. Informed written consent was obtained prior to the start of the procedure and can be found in the patient's chart.    PATIENT NAME: Daniel Garcias   MRN: [id number]     DATE OF PROCEDURE: 06/17/2024    PROCEDURE: Lumbar Interlaminar Epidural Steroid Injection L4/L5 under Fluoroscopic Guidance    PRE-OP DIAGNOSIS: Lumbosacral radiculopathy [M54.17]  DDD (degenerative disc disease), lumbosacral [M51.37] Lumbar radiculopathy [M54.16]    POST-OP DIAGNOSIS: Same    PHYSICIAN: Magdaleno James MD    MEDICATIONS INJECTED: Preservative-free Decadron 10mg with 7cc of preservative free normal saline    LOCAL ANESTHETIC INJECTED: Xylocaine 2%     SEDATION: Versed 2mg and Fentanyl 0mcg                                                                                                                                                                                     Conscious sedation ordered by M.D. Patient re-evaluation prior to administration of conscious sedation. No changes noted in patient's status from initial evaluation. The patient's vital signs were monitored by RN and patient remained hemodynamically stable throughout the procedure.    Event Time In   Sedation Start 1406   Sedation End 1418       ESTIMATED BLOOD LOSS: None    COMPLICATIONS: None    TECHNIQUE: Time-out was performed to identify the patient and procedure to be performed. With the patient laying in a prone position, the surgical area was prepped and draped in the usual sterile fashion using ChloraPrep and a fenestrated drape. The level was determined under fluoroscopy guidance. Skin anesthesia was achieved by injecting Lidocaine 2% over the injection site. The interlaminar space was then  approached with a 20 gauge,  3.5 inch Tuohy needle that was introduced under fluoroscopic guidance in the AP, lateral and/or contralateral oblique imaging. Once the Ligamentum flavum was encountered loss of resistance to saline was used to enter the epidural space. With positive loss of resistance and negative aspiration for CSF or Blood, contrast dye Omnipaque (300mg/mL) was injected to confirm placement and there was no vascular runoff. 8 mL of the medication mixture listed above was injected slowly. Displacement of the radio opaque contrast after injection of the medication confirmed that the medication went into the area of the epidural space. The needles were removed and bleeding was nil. A sterile dressing was applied. No specimens collected. The patient tolerated the procedure well.       The patient was monitored after the procedure in the recovery area. They were given post-procedure and discharge instructions to follow at home. The patient was discharged in a stable condition.      Magdaleno James MD

## 2024-06-17 NOTE — DISCHARGE INSTRUCTIONS
[facility] [place]  Dr. Magdaleno James  Messaging service # [phone]    POST-PROCEDURE INSTRUCTIONS:    Today you had an injection that included a steroid medications.  The steroid may or may not have been mixed with a local anesthetic when it was injected.   If the injection was in the neck, you may feel some pressure, numbness, or slight weakness in the arm after the procedure for a short period of time (this is a normal response), if this persists for longer than 1 day please contact our office or go to the emergency room.  If the injection was in the low back, you may feel some pressure, numbness, or slight weakness in the leg after the procedure for a short period of time (this is a normal response), if this persists for longer than 1 day please contact our office or go to the emergency room.  You may get side effects from the steroid.  This is not uncommon.  Symptoms include: elevated blood sugar, elevated blood pressure, headache, flushing, nausea, insomnia.  These symptoms are transient and will resolve within 1-3 days.  If symptoms last longer than this please contact our office or head to the emergency room.  Steroid medications can take anywhere from 3-14 days to take effect (rarely longer).  You may notice that your pain worsens for a short period of time after the injection, this would not be unusual due to the pressure and trauma from the needle.    If you do not have a follow up appointment scheduled, please contact my office (or the office of the physician who referred you for the procedure) to get a post-procedure follow up scheduled 2-4 weeks after the procedure.  This can be done as a virtual visit if that is more convenient for you.      What you need to do:    Keep a record of your response to the injection you had today.    How much relief did you get?   When did the relief start and how long did it last?  Were you able to decrease the use of any of your pain  medications?  Were you able to increase your level of activity?  How long did the relief last?    What to watch out for:    If you experience any of the following symptoms after your procedure, please notify the messaging service immediately (see above for contact information):   fever (increased oral temperature)   bleeding or swelling at the injection site,    drainage, rash or redness at the injection site    possible signs of infection    increased pain at the injection site   worsening of your usual pain   severe headache   new or worsening numbness    new arm and/or leg weakness, or    changes in bowel and/or bladder function: urinating or defecating on yourself and not knowing that you did it.    PLEASE FOLLOW ALL INSTRUCTIONS CAREFULLY     Do not engage in strenuous activity (e.g., lifting or pushing heavy objects or repeated bending) for 24 hours.     Do not take a bath, swim or use Jacuzzi for 24 hours after procedure. (A shower is fine).   Remove any Band-Aids when you get home.    Use cold/ice, as needed for comfort.  We recommend the use of cold therapy alternating on for 20 minutes, off for 20 minutes.    Do not apply direct heat (heating pad or heat packs) to the injection site for 24 hours.     Resume your usual medications, unless instructed otherwise by your Pain Physician.     If you are on warfarin (Coumadin) or other blood thinner, resume this medication as instructed by your prescribing Physician.    IF AT ANY POINT YOU ARE VERY CONCERNED ABOUT YOUR SYMPTOMS, PLEASE GO TO THE EMERGENCY ROOM.    If you develop worsening pain, weakness, numbness, lose bowel or bladder control (i.e., having an accident where you did not even know you had to go to the bathroom and suddenly noticed you soiled yourself), saddle anesthesia (a loss of sensation restricted to the area of the buttocks, anus and between the legs -- i.e., those parts of your body that would touch a saddle if you were sitting on one) you  need to go immediately to the emergency department for evaluation and treatment.    ----------------------------------------------------------------------------------------------------------------------------------------------------------------  If you received Sedation please read the following instructions:  POST SEDATION INSTRUCTIONS    Today you received intravenous medication (also known as sedation) that was used to help you relax and/or decrease discomfort during your procedure. This medication will be acting in your body for the next 24 hours, so you might feel a little tired or sleepy. This feeling will slowly wear off.   Common side effects associated with these medications include: drowsiness, dizziness, sleepiness, confusion, feeling excited, difficulty remembering things, lack of steadiness with walking or balance, loss of fine muscle control, slowed reflexes, difficulty focusing, and blurred vision.  Some over-the-counter and prescription medications (e.g., muscle relaxants, opioids, mood-altering medications, sedatives/hypnotics, antihistamines) can interact with the intravenous medication you received and cause an increased risk of the side effects listed above in addition to other potentially life threatening side effects. Use extreme caution if you are taking such medications, and consult with your Pain Physician or prescribing physician if you have any questions.  For the next 12-24 hours:    DO NOT--Drive a car, operate machinery or power tools   DO NOT--Drink any alcoholic beverages (not even beer), they may dangerously increase the risk of side effects.    DO NOT--Make any important legal or business decisions or sign important documents.  We advise you to have someone to assist you at home. Move slowly and carefully. Do not make sudden changes in position. Be aware of dizziness or light-headedness and move accordingly.   If you seek medical treatment within 24 hours, let the nurse or doctor  caring for you know that you have received the above medications. If you have any questions or concerns related to your sedation or treatment today please contact us.

## 2024-06-18 ENCOUNTER — LAB VISIT (OUTPATIENT)
Dept: LAB | Facility: HOSPITAL | Age: 49
End: 2024-06-18
Attending: FAMILY MEDICINE
Payer: COMMERCIAL

## 2024-06-18 DIAGNOSIS — E78.5 HYPERLIPIDEMIA, UNSPECIFIED HYPERLIPIDEMIA TYPE: ICD-10-CM

## 2024-06-18 LAB
ALT SERPL W/O P-5'-P-CCNC: 20 U/L (ref 10–44)
AST SERPL-CCNC: 10 U/L (ref 10–40)
CHOLEST SERPL-MCNC: 205 MG/DL (ref 120–199)
CHOLEST/HDLC SERPL: 3.6 {RATIO} (ref 2–5)
HDLC SERPL-MCNC: 57 MG/DL (ref 40–75)
HDLC SERPL: 27.8 % (ref 20–50)
LDLC SERPL CALC-MCNC: 137.2 MG/DL (ref 63–159)
NONHDLC SERPL-MCNC: 148 MG/DL
TRIGL SERPL-MCNC: 54 MG/DL (ref 30–150)

## 2024-06-18 PROCEDURE — 84450 TRANSFERASE (AST) (SGOT): CPT | Performed by: FAMILY MEDICINE

## 2024-06-18 PROCEDURE — 80061 LIPID PANEL: CPT | Performed by: FAMILY MEDICINE

## 2024-06-18 PROCEDURE — 84460 ALANINE AMINO (ALT) (SGPT): CPT | Performed by: FAMILY MEDICINE

## 2024-06-18 PROCEDURE — 36415 COLL VENOUS BLD VENIPUNCTURE: CPT | Performed by: FAMILY MEDICINE

## 2024-07-31 ENCOUNTER — TELEPHONE (OUTPATIENT)
Dept: INTERNAL MEDICINE | Facility: CLINIC | Age: 49
End: 2024-07-31
Payer: COMMERCIAL

## 2024-07-31 ENCOUNTER — PATIENT MESSAGE (OUTPATIENT)
Dept: INTERNAL MEDICINE | Facility: CLINIC | Age: 49
End: 2024-07-31
Payer: COMMERCIAL

## 2024-07-31 DIAGNOSIS — E78.2 MIXED HYPERLIPIDEMIA: ICD-10-CM

## 2024-07-31 DIAGNOSIS — E78.5 HYPERLIPIDEMIA, UNSPECIFIED HYPERLIPIDEMIA TYPE: Primary | ICD-10-CM

## 2024-07-31 RX ORDER — ATORVASTATIN CALCIUM 40 MG/1
40 TABLET, FILM COATED ORAL DAILY
Qty: 90 TABLET | Refills: 3 | Status: SHIPPED | OUTPATIENT
Start: 2024-07-31

## 2024-07-31 NOTE — TELEPHONE ENCOUNTER
Message sent to patient about atorvastatin use.    Please call patient and explain that tests show LDL was much better, but still over goal. I will increase the atorvastatin dose to 40 mg daily..    I recommend follow up testing. Please see orders for ALT, AST, and TSH and please schedule in 3 months.     Thank you.

## 2024-08-05 ENCOUNTER — ANESTHESIA EVENT (OUTPATIENT)
Dept: ENDOSCOPY | Facility: HOSPITAL | Age: 49
End: 2024-08-05
Payer: COMMERCIAL

## 2024-08-05 NOTE — ANESTHESIA PREPROCEDURE EVALUATION
08/05/2024  Daniel Garcias is a 49 y.o., male.    [facility]  Anesthesia Pre-Operative Evaluation       Patient Name: Daniel Garcias  YOB: 1975  MRN: [id number]  CSN: [id number]      Code Status: No Order   Date of Procedure: 8/14/2024  Anesthesia: Choice Procedure: Procedure(s) (LRB):  COLONOSCOPY (N/A)  Pre-Operative Diagnosis: Colon cancer screening [Z12.11]  Family history of colon cancer [Z80.0]  Proceduralist: Surgeons and Role:     * Ian Tatum MD - Primary Nurse: (Unknown)      SUBJECTIVE:   Daniel Garcias is a 49 y.o. male who  has a past medical history of Cervical radiculopathy (10/29/2019), Cervical strain (4/20/2021), History of repair of ACL - 1994, meniscus 2007 (6/24/2019), Intractable episodic headache (4/22/2019), and Sciatica of right side (4/23/2019)..     he has a current medication list which includes the following long-term medication(s): amlodipine, atorvastatin, and sildenafil.     ALLERGIES:     Review of patient's allergies indicates:   Allergen Reactions    Shellfish containing products Hives     LDA:          Lines/Drains/Airways       None                  Anesthesia Evaluation      Airway   Dental      Pulmonary    Cardiovascular   (+) hypertension    ECG reviewed    Neuro/Psych    (+) neuromuscular disease, headaches    GI/Hepatic/Renal      Endo/Other    (+) arthritis  Abdominal                     MEDICATIONS:     Antibiotics (From admission, onward)      None          VTE Risk Mitigation (From admission, onward)      None              No current facility-administered medications for this encounter.     Current Outpatient Medications   Medication Sig Dispense Refill    amLODIPine (NORVASC) 5 MG tablet Take 1 tablet (5 mg total) by mouth once daily. 90 tablet 1    atorvastatin (LIPITOR) 40 MG tablet Take 1 tablet (40 mg total) by  mouth once daily. 90 tablet 3    multivitamin (THERAGRAN) per tablet Take 1 tablet by mouth once daily.      sildenafiL (VIAGRA) 100 MG tablet Take 1 tablet (100 mg total) by mouth daily as needed for Erectile Dysfunction. 30 tablet 2          History:   There are no hospital problems to display for this patient.    Surgical History:    has a past surgical history that includes Knee arthroscopy w/ meniscal repair (1998); Knee arthroscopy w/ ACL reconstruction; Colonoscopy (N/A, 4/11/2018); Transforaminal epidural injection of steroid (Right, 10/21/2022); Minimally invasive surgical removal of intervertebral disc of spine using microscope (N/A, 3/27/2023); Epidural steroid injection into lumbar spine (Right, 2/15/2024); and Epidural steroid injection into lumbar spine (N/A, 6/17/2024).   Social History:    reports being sexually active and has had partner(s) who are female.  reports that he has never smoked. He has never used smokeless tobacco. He reports that he does not drink alcohol and does not use drugs.     OBJECTIVE:     Vital Signs (Most Recent):    Vital Signs Range (Last 24H):          There is no height or weight on file to calculate BMI.   Wt Readings from Last 4 Encounters:   06/17/24 127 kg (280 lb)   05/30/24 127 kg (280 lb)   04/29/24 132.2 kg (291 lb 8.9 oz)   04/29/24 131 kg (288 lb 12.8 oz)       Significant Labs:  Lab Results   Component Value Date    WBC 4.18 01/25/2023    HGB 14.6 01/25/2023    HCT 45.7 01/25/2023     01/25/2023     03/16/2024    K 4.1 03/16/2024     03/16/2024    CREATININE 1.1 03/16/2024    BUN 13 03/16/2024    CO2 30 (H) 03/16/2024    GLU 92 03/16/2024    CALCIUM 9.3 03/16/2024    MG 2.3 11/19/2007    PHOS 4.1 11/19/2007    ALKPHOS 51 (L) 03/16/2024    ALT 20 06/18/2024    AST 10 06/18/2024    ALBUMIN 3.6 03/16/2024    INR 1.2 01/25/2023    APTT 31.9 01/25/2023    HGBA1C 5.3 07/27/2022     (H) 11/17/2007    TROPONINI <0.006 11/17/2007     No LMP for  "male patient.  No results found for this or any previous visit (from the past 72 hour(s)).    EKG:   Results for orders placed or performed during the hospital encounter of 01/27/23   EKG 12-lead    Collection Time: 01/27/23  9:47 AM    Narrative    Test Reason : Z01.818,    Vent. Rate : 063 BPM     Atrial Rate : 063 BPM     P-R Int : 148 ms          QRS Dur : 084 ms      QT Int : 402 ms       P-R-T Axes : 041 104 017 degrees     QTc Int : 411 ms    Normal sinus rhythm  Rightward axis  Borderline Abnormal ECG  When compared with ECG of 04-JUN-2018 09:43,  No significant change was found  Confirmed by Wayne Miranda MD ([id number]) on 1/27/2023 9:53:14 AM    Referred By: JESSIKA VAZ           Confirmed By:Wayne Miranda MD       TTE:  No results found for this or any previous visit.  No results found for: "EF"   No results found for this or any previous visit.  NICHOLE:  No results found for this or any previous visit.  Stress Test:  No results found for this or any previous visit.     LHC:  No results found for this or any previous visit.     PFT:  No results found for: "FEV1", "FVC", "VKT6SPB", "TLC", "DLCO"     ASSESSMENT/PLAN:       Pre-op Assessment    I have reviewed the Patient Summary Reports.     I have reviewed the Nursing Notes. I have reviewed the NPO Status.   I have reviewed the Medications.     Review of Systems  Anesthesia Hx:  No problems with previous Anesthesia             Denies Family Hx of Anesthesia complications.    Denies Personal Hx of Anesthesia complications.                    Social:  Non-Smoker, No Alcohol Use       Hematology/Oncology:  Hematology Normal   Oncology Normal                                   EENT/Dental:  EENT/Dental Normal           Cardiovascular:     Hypertension              ECG has been reviewed.                          Pulmonary:  Pulmonary Normal                       Renal/:  Renal/ Normal                 Hepatic/GI:  Hepatic/GI Normal               "   Musculoskeletal:  Arthritis               Neurological:    Neuromuscular Disease,  Headaches                                 Endocrine:  Endocrine Normal            Dermatological:  Skin Normal    Psych:  Psychiatric Normal                       Anesthesia Plan  Type of Anesthesia, risks & benefits discussed:    Anesthesia Type: Gen Natural Airway  Intra-op Monitoring Plan: Standard ASA Monitors  Induction:  IV  Informed Consent: Informed consent signed with the Patient and all parties understand the risks and agree with anesthesia plan.  All questions answered. Patient consented to blood products? No  ASA Score: 2  Day of Surgery Review of History & Physical: H&P Update referred to the surgeon/provider.    Ready For Surgery From Anesthesia Perspective.     .

## 2024-08-06 ENCOUNTER — TELEPHONE (OUTPATIENT)
Dept: ENDOSCOPY | Facility: HOSPITAL | Age: 49
End: 2024-08-06
Payer: COMMERCIAL

## 2024-08-09 ENCOUNTER — E-VISIT (OUTPATIENT)
Dept: INTERNAL MEDICINE | Facility: CLINIC | Age: 49
End: 2024-08-09
Attending: FAMILY MEDICINE
Payer: COMMERCIAL

## 2024-08-09 DIAGNOSIS — E66.3 OVERWEIGHT: Primary | ICD-10-CM

## 2024-08-09 DIAGNOSIS — E78.2 MIXED HYPERLIPIDEMIA: ICD-10-CM

## 2024-08-09 DIAGNOSIS — I10 HYPERTENSION, ESSENTIAL: ICD-10-CM

## 2024-08-14 ENCOUNTER — HOSPITAL ENCOUNTER (OUTPATIENT)
Facility: HOSPITAL | Age: 49
Discharge: HOME | End: 2024-08-14
Attending: INTERNAL MEDICINE | Admitting: INTERNAL MEDICINE
Payer: COMMERCIAL

## 2024-08-14 ENCOUNTER — ANESTHESIA (OUTPATIENT)
Dept: ENDOSCOPY | Facility: HOSPITAL | Age: 49
End: 2024-08-14
Payer: COMMERCIAL

## 2024-08-14 VITALS
RESPIRATION RATE: 14 BRPM | HEART RATE: 53 BPM | DIASTOLIC BLOOD PRESSURE: 80 MMHG | SYSTOLIC BLOOD PRESSURE: 147 MMHG | BODY MASS INDEX: 35.43 KG/M2 | OXYGEN SATURATION: 100 % | WEIGHT: 285 LBS | TEMPERATURE: 98 F | HEIGHT: 75 IN

## 2024-08-14 DIAGNOSIS — Z12.11 SCREEN FOR COLON CANCER: ICD-10-CM

## 2024-08-14 DIAGNOSIS — Z80.0 FAMILY HISTORY OF COLON CANCER: Primary | ICD-10-CM

## 2024-08-14 PROCEDURE — 37000009 HC ANESTHESIA EA ADD 15 MINS: Performed by: INTERNAL MEDICINE

## 2024-08-14 PROCEDURE — G0105 COLORECTAL SCRN; HI RISK IND: HCPCS | Mod: ,,, | Performed by: INTERNAL MEDICINE

## 2024-08-14 PROCEDURE — 99900035 HC TECH TIME PER 15 MIN (STAT)

## 2024-08-14 PROCEDURE — 25000003 PHARM REV CODE 250: Performed by: NURSE ANESTHETIST, CERTIFIED REGISTERED

## 2024-08-14 PROCEDURE — 25000003 PHARM REV CODE 250: Performed by: INTERNAL MEDICINE

## 2024-08-14 PROCEDURE — G0105 COLORECTAL SCRN; HI RISK IND: HCPCS | Performed by: INTERNAL MEDICINE

## 2024-08-14 PROCEDURE — 37000008 HC ANESTHESIA 1ST 15 MINUTES: Performed by: INTERNAL MEDICINE

## 2024-08-14 PROCEDURE — 94761 N-INVAS EAR/PLS OXIMETRY MLT: CPT

## 2024-08-14 PROCEDURE — 63600175 PHARM REV CODE 636 W HCPCS: Performed by: NURSE ANESTHETIST, CERTIFIED REGISTERED

## 2024-08-14 RX ORDER — SODIUM CHLORIDE 9 MG/ML
INJECTION, SOLUTION INTRAVENOUS CONTINUOUS
Status: DISCONTINUED | OUTPATIENT
Start: 2024-08-14 | End: 2024-08-14 | Stop reason: HOSPADM

## 2024-08-14 RX ORDER — PROPOFOL 10 MG/ML
VIAL (ML) INTRAVENOUS
Status: DISCONTINUED | OUTPATIENT
Start: 2024-08-14 | End: 2024-08-14

## 2024-08-14 RX ORDER — PROPOFOL 10 MG/ML
VIAL (ML) INTRAVENOUS CONTINUOUS PRN
Status: DISCONTINUED | OUTPATIENT
Start: 2024-08-14 | End: 2024-08-14

## 2024-08-14 RX ORDER — FENTANYL CITRATE 50 UG/ML
INJECTION, SOLUTION INTRAMUSCULAR; INTRAVENOUS
Status: DISCONTINUED | OUTPATIENT
Start: 2024-08-14 | End: 2024-08-14

## 2024-08-14 RX ORDER — LIDOCAINE HYDROCHLORIDE 20 MG/ML
INJECTION INTRAVENOUS
Status: DISCONTINUED | OUTPATIENT
Start: 2024-08-14 | End: 2024-08-14

## 2024-08-14 RX ADMIN — FENTANYL CITRATE 50 MCG: 50 INJECTION, SOLUTION INTRAMUSCULAR; INTRAVENOUS at 11:08

## 2024-08-14 RX ADMIN — LIDOCAINE HYDROCHLORIDE 100 MG: 20 INJECTION INTRAVENOUS at 11:08

## 2024-08-14 RX ADMIN — PROPOFOL 100 MG: 10 INJECTION, EMULSION INTRAVENOUS at 11:08

## 2024-08-14 RX ADMIN — PROPOFOL 200 MCG/KG/MIN: 10 INJECTION, EMULSION INTRAVENOUS at 11:08

## 2024-08-14 RX ADMIN — SODIUM CHLORIDE: 0.9 INJECTION, SOLUTION INTRAVENOUS at 10:08

## 2024-08-14 NOTE — PROVATION PATIENT INSTRUCTIONS
Discharge Summary/Instructions after an Endoscopic Procedure  Patient Name: Daniel Garcias  Patient MRN: [id number]  Patient YOB: 1975 Wednesday, August 14, 2024  Ian Tatum MD  Dear patient,  As a result of recent federal legislation (The Federal Cures Act), you may   receive lab or pathology results from your procedure in your [facility]   account before your physician is able to contact you. Your physician or   their representative will relay the results to you with their   recommendations at their soonest availability.  Thank you,  RESTRICTIONS:  During your procedure today, you received medications for sedation.  These   medications may affect your judgment, balance and coordination.  Therefore,   for 24 hours, you have the following restrictions:   - DO NOT drive a car, operate machinery, make legal/financial decisions,   sign important papers or drink alcohol.    ACTIVITY:  Today: no heavy lifting, straining or running due to procedural   sedation/anesthesia.  The following day: return to full activity including work.  DIET:  Eat and drink normally unless instructed otherwise.     TREATMENT FOR COMMON SIDE EFFECTS:  - Mild abdominal pain, nausea, belching, bloating or excessive gas:  rest,   eat lightly and use a heating pad.  - Sore Throat: treat with throat lozenges and/or gargle with warm salt   water.  - Because air was used during the procedure, expelling large amounts of air   from your rectum or belching is normal.  - If a bowel prep was taken, you may not have a bowel movement for 1-3 days.    This is normal.  SYMPTOMS TO WATCH FOR AND REPORT TO YOUR PHYSICIAN:  1. Abdominal pain or bloating, other than gas cramps.  2. Chest pain.  3. Back pain.  4. Signs of infection such as: chills or fever occurring within 24 hours   after the procedure.  5. Rectal bleeding, which would show as bright red, maroon, or black stools.   (A tablespoon of blood from the rectum is not serious, especially  if   hemorrhoids are present.)  6. Vomiting.  7. Weakness or dizziness.  GO DIRECTLY TO THE NEAREST EMERGENCY ROOM IF YOU HAVE ANY OF THE FOLLOWING:      Difficulty breathing              Chills and/or fever over 101 F   Persistent vomiting and/or vomiting blood   Severe abdominal pain   Severe chest pain   Black, tarry stools   Bleeding- more than one tablespoon   Any other symptom or condition that you feel may need urgent attention  Your doctor recommends these additional instructions:  If any biopsies were taken, your doctors clinic will contact you in 1 to 2   weeks with any results.  - Patient has a contact number available for emergencies.  The signs and   symptoms of potential delayed complications were discussed with the   patient.  Return to normal activities tomorrow.  Written discharge   instructions were provided to the patient.   - Discharge patient to home.   - Resume previous diet.   - Continue present medications.   - Repeat colonoscopy in 5 years for screening purposes.   For questions, problems or results please call your physician - Ian Tatum MD at Work:  [phone].  [facility], EMERGENCY ROOM PHONE NUMBER: [phone]  IF A COMPLICATION OR EMERGENCY SITUATION ARISES AND YOU ARE UNABLE TO REACH   YOUR PHYSICIAN - GO DIRECTLY TO THE EMERGENCY ROOM.  Ian Tatum MD  8/14/2024 11:46:01 AM  This report has been verified and signed electronically.  Dear patient,  As a result of recent federal legislation (The Federal Cures Act), you may   receive lab or pathology results from your procedure in your [facility]   account before your physician is able to contact you. Your physician or   their representative will relay the results to you with their   recommendations at their soonest availability.  Thank you,  PROVATION

## 2024-08-14 NOTE — ANESTHESIA POSTPROCEDURE EVALUATION
Anesthesia Post Evaluation    Patient: Daniel Garcias    Procedure(s) Performed: Procedure(s) (LRB):  COLONOSCOPY (N/A)    Final Anesthesia Type: general      Patient location during evaluation: PACU  Patient participation: Yes- Able to Participate  Level of consciousness: awake and alert  Post-procedure vital signs: reviewed and stable  Pain management: adequate  Airway patency: patent    PONV status at discharge: No PONV  Anesthetic complications: no      Cardiovascular status: blood pressure returned to baseline and hemodynamically stable  Respiratory status: unassisted  Hydration status: euvolemic  Follow-up not needed.              Vitals Value Taken Time   /80 08/14/24 1220   Temp 36.9 °C (98.4 °F) 08/14/24 1144   Pulse 53 08/14/24 1220   Resp 14 08/14/24 1220   SpO2 100 % 08/14/24 1220         Event Time   Out of Recovery 12:10:00         Pain/Wendy Score: Wendy Score: 9 (8/14/2024 12:10 PM)           ABEL Jennings

## 2024-08-14 NOTE — TRANSFER OF CARE
"Anesthesia Transfer of Care Note    Patient: Daniel Garcias    Procedure(s) Performed: Procedure(s) (LRB):  COLONOSCOPY (N/A)    Patient location: PACU    Anesthesia Type: general    Transport from OR: Transported from OR on room air with adequate spontaneous ventilation    Post pain: adequate analgesia    Post assessment: no apparent anesthetic complications    Post vital signs: stable    Level of consciousness: awake    Nausea/Vomiting: no nausea/vomiting    Complications: none    Transfer of care protocol was followed      Last vitals: Visit Vitals  BP (!) 141/83 (BP Location: Left arm, Patient Position: Lying)   Pulse 70   Temp 37.7 °C (99.8 °F) (Temporal)   Resp 19   Ht 6' 3" (1.905 m)   Wt 129.3 kg (285 lb)   SpO2 97%   BMI 35.62 kg/m²     "

## 2024-08-14 NOTE — PLAN OF CARE
Chart reviewed. Preop nursing care completed per orders. Safe surgery checklist complete. Pt denies any open wounds cuts or sores. Pt denies any metal in body or use of weight loss injections. Pt AAOX3, VSS on room air. Pt toileted, Bed locked in lowest position, Call light within reach. Pt denies any needs at this time. Belongings placed in locker #9.

## 2024-08-14 NOTE — H&P
Short Stay Endoscopy History and Physical    PCP - Donte Izquierdo MD    Procedure - Colonoscopy  Sedation: GA  ASA - per anesthesia  Mallampati - per anesthesia  History of Anesthesia problems - no  Family history Anesthesia problems -  no     HPI:  This is a 49 y.o. male here for evaluation of : Family history of colon cancer    Reflux - no  Dysphagia - no  Abdominal pain - no  Diarrhea - no    ROS:  Constitutional: No fevers, chills, No weight loss  ENT: No allergies  CV: No chest pain  Pulm: No cough, No shortness of breath  Ophtho: No vision changes  GI: see HPI  Medical History:  has a past medical history of Cervical radiculopathy (10/29/2019), Cervical strain (4/20/2021), History of repair of ACL - 1994, meniscus 2007 (6/24/2019), Intractable episodic headache (4/22/2019), and Sciatica of right side (4/23/2019).    Surgical History:  has a past surgical history that includes Knee arthroscopy w/ meniscal repair (1998); Knee arthroscopy w/ ACL reconstruction; Colonoscopy (N/A, 4/11/2018); Transforaminal epidural injection of steroid (Right, 10/21/2022); Minimally invasive surgical removal of intervertebral disc of spine using microscope (N/A, 3/27/2023); Epidural steroid injection into lumbar spine (Right, 2/15/2024); and Epidural steroid injection into lumbar spine (N/A, 6/17/2024).    Family History: family history includes Cancer in his maternal grandmother; Hyperlipidemia in his father; Hypertension in his mother.. Otherwise no colon cancer, inflammatory bowel disease, or GI malignancies.    Social History:  reports that he has never smoked. He has never used smokeless tobacco. He reports that he does not drink alcohol and does not use drugs.    Review of patient's allergies indicates:   Allergen Reactions    Shellfish containing products Hives       Medications:   Medications Prior to Admission   Medication Sig Dispense Refill Last Dose    amLODIPine (NORVASC) 5 MG tablet Take 1 tablet (5 mg total)  by mouth once daily. 90 tablet 1 8/13/2024    atorvastatin (LIPITOR) 40 MG tablet Take 1 tablet (40 mg total) by mouth once daily. 90 tablet 3 8/13/2024    multivitamin (THERAGRAN) per tablet Take 1 tablet by mouth once daily.   More than a month    sildenafiL (VIAGRA) 100 MG tablet Take 1 tablet (100 mg total) by mouth daily as needed for Erectile Dysfunction. 30 tablet 2 More than a month       Objective Findings:    Vital Signs: Per nursing notes.    Physical Exam:  General Appearance: Well appearing in no acute distress  Head:   Normocephalic, without obvious abnormality  Eyes:    No scleral icterus  Airway: Open  Neck: No restriction in mobility  Lungs: CTA bilaterally in anterior and posterior fields, no wheezes, no crackles.  Heart:  Regular rate and rhythm, S1, S2 normal, no murmurs heard  Abdomen: Soft, non tender, non distended      Labs:  Lab Results   Component Value Date    WBC 4.18 01/25/2023    HGB 14.6 01/25/2023    HCT 45.7 01/25/2023     01/25/2023    CHOL 205 (H) 06/18/2024    TRIG 54 06/18/2024    HDL 57 06/18/2024    ALT 20 06/18/2024    AST 10 06/18/2024     03/16/2024    K 4.1 03/16/2024     03/16/2024    CREATININE 1.1 03/16/2024    BUN 13 03/16/2024    CO2 30 (H) 03/16/2024    TSH 0.616 07/27/2022    PSA 0.66 03/16/2024    INR 1.2 01/25/2023    HGBA1C 5.3 07/27/2022         I have explained the risks and benefits of endoscopy procedures to the patient including but not limited to bleeding, perforation, infection, and death.    Thank you so much for allowing me to participate in the care of Daniel Tatum MD

## 2024-08-19 ENCOUNTER — PATIENT MESSAGE (OUTPATIENT)
Dept: SPINE | Facility: CLINIC | Age: 49
End: 2024-08-19
Payer: COMMERCIAL

## 2024-09-04 ENCOUNTER — PROCEDURE VISIT (OUTPATIENT)
Dept: NEUROLOGY | Facility: CLINIC | Age: 49
End: 2024-09-04
Payer: COMMERCIAL

## 2024-09-04 DIAGNOSIS — R20.2 PARESTHESIA OF RIGHT LEG: ICD-10-CM

## 2024-09-04 PROCEDURE — 99214 OFFICE O/P EST MOD 30 MIN: CPT | Mod: 25,S$GLB,, | Performed by: PSYCHIATRY & NEUROLOGY

## 2024-09-04 PROCEDURE — 95910 NRV CNDJ TEST 7-8 STUDIES: CPT | Mod: S$GLB,,, | Performed by: PSYCHIATRY & NEUROLOGY

## 2024-09-04 PROCEDURE — 95886 MUSC TEST DONE W/N TEST COMP: CPT | Mod: S$GLB,,, | Performed by: PSYCHIATRY & NEUROLOGY

## 2024-09-04 NOTE — PROCEDURES
EMG W/ ULTRASOUND AND NERVE CONDUCTION TEST 2 Extremities    Date/Time: 9/4/2024 8:00 AM    Performed by: James Kirkpatrick MD  Authorized by: Saad Gordon MD                                                                 [facility] [address]     Subjective:       Patient ID: Daniel Garcias is a 49 y.o. male here for a EMG focused evaluation for leg pain. Previous visits and diagnostic evaluation has been reviewed.  Patient has a history of back surgery.  Patient states surgery was successful in reducing back pain however he still has persistent right leg numbness and intermittent right leg weakness.  He specifically describes numbness involving the right lateral thigh and lower leg region.  Symptoms have been progressively worsening and severe at times.   HPI  Review of patient's allergies indicates:   Allergen Reactions    Shellfish containing products Hives      There were no vitals filed for this visit.   Chief Complaint: No chief complaint on file.    Past Medical History:   Diagnosis Date    Cervical radiculopathy 10/29/2019    Cervical strain 4/20/2021    History of repair of ACL - 1994, meniscus 2007 6/24/2019    1994, meniscus 2007    Intractable episodic headache 4/22/2019    Sciatica of right side 4/23/2019      Social History     Socioeconomic History    Marital status:     Number of children: 3   Occupational History    Occupation: Operations   Tobacco Use    Smoking status: Never    Smokeless tobacco: Never   Substance and Sexual Activity    Alcohol use: No    Drug use: No    Sexual activity: Yes     Partners: Female      Review of Systems:   No Fever  No SOB  No vomiting  No visual disturbance      Objective:      Physical Exam    Constitutional: Patient appears well-nourished.   Head: Normocephalic and atraumatic.   Mouth/Throat: Oropharynx is clear and moist.   Pulmonary/Chest: Effort normal.   Abdominal: Soft.   Skin: Skin is warm and dry.      General:  Patient is alert and  cooperative.  Affect:  Patient is appropriate to surroundings and environment.  Language:  Speech is fluent.  HEENT:  There are no outward signs of trauma to head or face.  Cranial Nerves:  Pupils are equal round and reactive to light. Extra-ocular movements are intact. Face, tongue, and palate are  symmetrical.  Motor:  Patient exhibits normal strength testing in bilateral proximal and distal lower extremities.  Reflexes:  Symmetrical in bilateral lower extremities at the knees.  Absent right ankle reflex.  Gait:  Ambulation is independent without use of cane or walker without signs of ataxia or circumduction.  Cerebellar:  Normal finger to nose testing without dysmetria.  Sensory:  Intact to sensory modalities tested.  Musculoskeletal:  There is no severe tenderness to palpation and manipulation of lumbar spine region.   Assessment:       We reviewed and discussed at length results of EMG of bilateral lower extremities performed today notable for a mild chronic right S1 radiculopathy. These findings are available via media section of chart review.   1. Paresthesia of right leg              Plan:       We discussed treatment options at length. Recommend patient keep appointment with referring provider.         I spent a total of 35 minutes on the day of the visit. This includes face to face time and non-face to face time preparing to see the patient (eg, review of tests), obtaining and/or reviewing separately obtained history, documenting clinical information in the electronic or other health record, independently interpreting results and communicating results to the patient/family/caregiver, or care coordinator.    James Kirkpatrick MD, FAAN   09/04/2024   9:14 AM       A dictation device was used to produce this document. Use of such devices sometimes results in grammatical errors or replacement of words that sound similarly.

## 2024-09-27 ENCOUNTER — TELEPHONE (OUTPATIENT)
Dept: NEUROSURGERY | Facility: CLINIC | Age: 49
End: 2024-09-27
Payer: COMMERCIAL

## 2024-09-27 NOTE — TELEPHONE ENCOUNTER
Called patient to reschedule an upcoming appointment with Saad Gordon MD  and provided next appointment date and time.  Future Appointments   Date Time Provider Department Center   10/28/2024  9:30 AM Saad Gordon MD [facility]   10/29/2024  8:30 AM [facility]        Patient voiced understanding and thanked me.

## 2024-10-06 DIAGNOSIS — I10 HYPERTENSION, ESSENTIAL: ICD-10-CM

## 2024-10-06 NOTE — TELEPHONE ENCOUNTER
No care due was identified.  [facility] Embedded Care Due Messages. Reference number: [id number].   10/06/2024 3:37:18 AM CDT

## 2024-10-06 NOTE — TELEPHONE ENCOUNTER
Refill Routing Note   Medication(s) are not appropriate for processing by [facility] for the following reason(s):        Required vitals abnormal  08/14/24 (!) 147/80       OR action(s):  Defer        Medication Therapy Plan: 08/14/24 (!) 147/80      Appointments  past 12m or future 3m with PCP    Date Provider   Last Visit   8/9/2024 Donte Izquierdo MD   Next Visit   Visit date not found Donte Izquierdo MD   ED visits in past 90 days: 0        Note composed:12:46 PM 10/06/2024

## 2024-10-07 RX ORDER — AMLODIPINE BESYLATE 5 MG/1
5 TABLET ORAL
Qty: 90 TABLET | Refills: 0 | Status: SHIPPED | OUTPATIENT
Start: 2024-10-07

## 2024-10-09 ENCOUNTER — TELEPHONE (OUTPATIENT)
Dept: INTERNAL MEDICINE | Facility: CLINIC | Age: 49
End: 2024-10-09
Payer: COMMERCIAL

## 2024-10-09 NOTE — TELEPHONE ENCOUNTER
----- Message from Lawrence sent at 10/9/2024 11:00 AM CDT -----  Contact: [phone]  [email or web address]     Patient is calling for Medical Advice regarding: Pt needs a call back about some shots he needs before leaving the country in Nov      How long has patient had these symptoms:    Pharmacy name and phone#:    Patient wants a call back or thru [facility]: call back     Comments:    Please advise patient replies from provider may take up to 48 hours.

## 2024-10-09 NOTE — TELEPHONE ENCOUNTER
Patient stated that he will be leaving out the country and he needs to get a yellow fever vaccine.

## 2024-10-14 ENCOUNTER — TELEPHONE (OUTPATIENT)
Dept: INFECTIOUS DISEASES | Facility: CLINIC | Age: 49
End: 2024-10-14
Payer: COMMERCIAL

## 2024-10-14 NOTE — TELEPHONE ENCOUNTER
----- Message from Med Assistant Soriano sent at 10/11/2024  4:12 PM CDT -----  Christie Pickering MA Lewis, Arlene, MA  Caller: [phone] (Today,  3:38 PM)       Previous Messages       ----- Message -----  From: Je Black  Sent: 10/11/2024   3:40 PM CDT  To: #  Subject: Appt                                            Type:  Sooner Appointment Request    Caller is requesting a sooner appointment.  Caller declined first available appointment listed below.  Caller will not accept being placed on the waitlist and is requesting a message be sent to doctor.    Name of Caller: Daniel Garcias    When is the first available appointment? 11/29    Symptoms: Travel Clinic    Would the patient rather a call back or a response via [employer]?  Call    Best Call Back Number: [phone]    Additional Information: Pt states his Travel date is 11/1. Would like to be seen sooner.

## 2024-10-15 ENCOUNTER — OFFICE VISIT (OUTPATIENT)
Dept: INFECTIOUS DISEASES | Facility: CLINIC | Age: 49
End: 2024-10-15
Payer: COMMERCIAL

## 2024-10-15 ENCOUNTER — CLINICAL SUPPORT (OUTPATIENT)
Dept: INFECTIOUS DISEASES | Facility: CLINIC | Age: 49
End: 2024-10-15
Payer: COMMERCIAL

## 2024-10-15 VITALS
DIASTOLIC BLOOD PRESSURE: 77 MMHG | TEMPERATURE: 98 F | SYSTOLIC BLOOD PRESSURE: 134 MMHG | WEIGHT: 301.56 LBS | HEART RATE: 82 BPM | BODY MASS INDEX: 37.7 KG/M2

## 2024-10-15 DIAGNOSIS — Z71.84 TRAVEL ADVICE ENCOUNTER: Primary | ICD-10-CM

## 2024-10-15 PROCEDURE — 99999 PR PBB SHADOW E&M-EST. PATIENT-LVL III: CPT | Mod: PBBFAC,,, | Performed by: STUDENT IN AN ORGANIZED HEALTH CARE EDUCATION/TRAINING PROGRAM

## 2024-10-15 PROCEDURE — G2211 COMPLEX E/M VISIT ADD ON: HCPCS | Mod: S$GLB,,, | Performed by: STUDENT IN AN ORGANIZED HEALTH CARE EDUCATION/TRAINING PROGRAM

## 2024-10-15 PROCEDURE — 90715 TDAP VACCINE 7 YRS/> IM: CPT | Mod: S$GLB,,, | Performed by: INTERNAL MEDICINE

## 2024-10-15 PROCEDURE — 99203 OFFICE O/P NEW LOW 30 MIN: CPT | Mod: S$GLB,,, | Performed by: STUDENT IN AN ORGANIZED HEALTH CARE EDUCATION/TRAINING PROGRAM

## 2024-10-15 PROCEDURE — 99999 PR PBB SHADOW E&M-EST. PATIENT-LVL I: CPT | Mod: PBBFAC,,,

## 2024-10-15 PROCEDURE — 90471 IMMUNIZATION ADMIN: CPT | Mod: S$GLB,,, | Performed by: INTERNAL MEDICINE

## 2024-10-15 PROCEDURE — 90656 IIV3 VACC NO PRSV 0.5 ML IM: CPT | Mod: S$GLB,,, | Performed by: INTERNAL MEDICINE

## 2024-10-15 PROCEDURE — 90691 TYPHOID VACCINE IM: CPT | Mod: S$GLB,,, | Performed by: INTERNAL MEDICINE

## 2024-10-15 PROCEDURE — 90734 MENACWYD/MENACWYCRM VACC IM: CPT | Mod: S$GLB,,, | Performed by: INTERNAL MEDICINE

## 2024-10-15 PROCEDURE — 90717 YELLOW FEVER VACCINE SUBQ: CPT | Mod: S$GLB,,, | Performed by: INTERNAL MEDICINE

## 2024-10-15 PROCEDURE — 90472 IMMUNIZATION ADMIN EACH ADD: CPT | Mod: S$GLB,,, | Performed by: INTERNAL MEDICINE

## 2024-10-15 PROCEDURE — 90480 ADMN SARSCOV2 VAC 1/ONLY CMP: CPT | Mod: S$GLB,,, | Performed by: INTERNAL MEDICINE

## 2024-10-15 PROCEDURE — 91320 SARSCV2 VAC 30MCG TRS-SUC IM: CPT | Mod: S$GLB,,, | Performed by: INTERNAL MEDICINE

## 2024-10-15 RX ORDER — AZITHROMYCIN 500 MG/1
500 TABLET, FILM COATED ORAL DAILY
Qty: 3 TABLET | Refills: 0 | Status: SHIPPED | OUTPATIENT
Start: 2024-10-15 | End: 2024-10-18

## 2024-10-15 RX ORDER — ATOVAQUONE AND PROGUANIL HYDROCHLORIDE 250; 100 MG/1; MG/1
1 TABLET, FILM COATED ORAL DAILY
Qty: 16 TABLET | Refills: 0 | Status: SHIPPED | OUTPATIENT
Start: 2024-10-15 | End: 2024-10-31

## 2024-10-15 NOTE — PROGRESS NOTES
Patient received 3 vaccine IM to the right deltoid, Covid Pfizer anterior, Tdap, Menveo posterior.  And also 2 vaccines IM to the left deltoid, Typhoid anterior, and Flu posterior.  And also Yellow fever sub Q to the left arm w/yellow card documentation.  Tolerated well and left in NAD

## 2024-10-15 NOTE — PROGRESS NOTES
INFECTIOUS DISEASE CLINIC  10/15/2024     Subjective:      Chief Complaint: travel clinic    History of Present Illness:    Mr. Garcias is a 49M with PMH of HTN and HLD, presents to travel clinic prior to trip to [facility].     When: 11/1 - 11/8  Where: [facility]  Lodging: with family  Food: homecooked  Activities: traveling around town, denies that he will have any exposure to water/animals activities    Vaccines   Hep A - 2011/2012  Hep B - 2010/2011  Typhoid  Meningococcal  Yellow fever  Flu  Covid      Review of Systems   Constitutional: Negative for chills, fever and malaise/fatigue.   Cardiovascular:  Negative for chest pain.   Respiratory:  Negative for shortness of breath.    Skin:  Negative for rash.   Musculoskeletal:  Negative for joint pain.   Gastrointestinal:  Negative for abdominal pain, diarrhea, nausea and vomiting.         Past Medical History:   Diagnosis Date    Cervical radiculopathy 10/29/2019    Cervical strain 4/20/2021    History of repair of ACL - 1994, meniscus 2007 6/24/2019    1994, meniscus 2007    Intractable episodic headache 4/22/2019    Sciatica of right side 4/23/2019     Past Surgical History:   Procedure Laterality Date    COLONOSCOPY N/A 4/11/2018    Procedure: COLONOSCOPY golytely;  Surgeon: Deborah Gamino MD;  Location: [facility] ENDO;  Service: Endoscopy;  Laterality: N/A;    COLONOSCOPY N/A 8/14/2024    Procedure: COLONOSCOPY;  Surgeon: Ian Tatum MD;  Location: [facility] ENDOSCOPY;  Service: Endoscopy;  Laterality: N/A;  5/30 ref by Donte Izquierdo MD, PEG, instr. to portal-st  8/6/24- pc complete. DBM    EPIDURAL STEROID INJECTION INTO LUMBAR SPINE Right 2/15/2024    Procedure: RT L4-5 and L5-S1 TFESI;  Surgeon: Magdaleno James MD;  Location: [facility] PAIN MANAGEMENT;  Service: Pain Management;  Laterality: Right;  30 mins    EPIDURAL STEROID INJECTION INTO LUMBAR SPINE N/A 6/17/2024    Procedure: SEAN L4/5;  Surgeon: Magdaleno James MD;  Location: [facility] PAIN MANAGEMENT;   Service: Pain Management;  Laterality: N/A;  20mins-no ac    KNEE ARTHROSCOPY W/ ACL RECONSTRUCTION      left    KNEE ARTHROSCOPY W/ MENISCAL REPAIR  1998    left    MINIMALLY INVASIVE SURGICAL REMOVAL OF INTERVERTEBRAL DISC OF SPINE USING MICROSCOPE N/A 3/27/2023    Procedure: RIGHT L5-S1 MICRODISCECTOMY,MINIMALLY INVASIVE APPROACH;  Surgeon: Saad Gordon MD;  Location: [facility];  Service: Neurosurgery;  Laterality: N/A;  RIGHT L5-S1 MICRODISCECTOMY,MINIMALLY INVASIVE APPROACH   ANESTHESIA: GENERAL   NERVE MON; EMG, SEP  POSITION: PRONE  BED: [place] [address] POST/ KATE FRAME  HEAD REST: PRONE VIEW  RADIOLOGY: C-ARM  MISC: MICROSCOPE, CARO    TRANSFORAMINAL EPIDURAL INJECTION OF STEROID Right 10/21/2022    Procedure: LUMBAR TRANSFORAMINAL RIGHT L5/S1 CONTRAST DIRECT REFERRAL;  Surgeon: Simone Prather MD;  Location: [facility];  Service: Pain Management;  Laterality: Right;     Family History   Problem Relation Name Age of Onset    Hypertension Mother      Hyperlipidemia Father      Cancer Maternal Grandmother          colon     Social History     Tobacco Use    Smoking status: Never    Smokeless tobacco: Never   Substance Use Topics    Alcohol use: No    Drug use: No       Review of patient's allergies indicates:   Allergen Reactions    Shellfish containing products Hives         Objective:   VS (24h):   Vitals:    10/15/24 0841   BP: 134/77   Pulse: 82   Temp: 98.1 °F (36.7 °C)         Physical Exam  Vitals reviewed.   Constitutional:       General: He is not in acute distress.     Appearance: Normal appearance. He is not ill-appearing.   HENT:      Head: Normocephalic and atraumatic.   Eyes:      Extraocular Movements: Extraocular movements intact.      Conjunctiva/sclera: Conjunctivae normal.   Cardiovascular:      Rate and Rhythm: Normal rate and regular rhythm.      Heart sounds: No murmur heard.  Pulmonary:      Effort: Pulmonary effort is normal. No respiratory distress.      Breath sounds: Normal breath  sounds.   Musculoskeletal:      Cervical back: Normal range of motion.   Skin:     General: Skin is warm and dry.   Neurological:      General: No focal deficit present.      Mental Status: He is alert and oriented to person, place, and time.   Psychiatric:         Mood and Affect: Mood normal.         Behavior: Behavior normal.         Thought Content: Thought content normal.               Immunization History   Administered Date(s) Administered    COVID-19, MRNA, LN-S, PF (Pfizer) ([place]) 03/03/2022    COVID-19, vector-nr, rS-Ad26, PF ([place]) 03/04/2021    DTaP 06/18/2012, 12/02/2014    DTaP / HiB / IPV 09/29/2010, 11/23/2010, 05/02/2011    Hepatitis A, Pediatric/Adolescent, 2 Dose 09/21/2011, 06/18/2012    Hepatitis B, Pediatric/Adolescent 07/24/2010, 09/29/2010, 05/02/2011    HiB PRP-T 06/18/2012    IPV 02/20/2015    Influenza - Quadrivalent - PF *Preferred* (6 months and older) 11/28/2014, 08/17/2015, 10/13/2016, 10/09/2018, 12/07/2019    MMR 10/18/1985, 04/05/1995, 09/21/2011    MMRV 12/02/2014    Pneumococcal Conjugate - 13 Valent 09/29/2010, 11/23/2010, 05/02/2011, 06/18/2012    Rotavirus Pentavalent 09/29/2010, 11/23/2010    Td (ADULT) 04/05/1995    Varicella 09/21/2011         Assessment & Plan:     1. Travel advice encounter    The patient was provided with an extensive travel guidance packet which provides travel information specific to the patients itinerary.     The patient's medical history was reviewed and the patient was counseled on:  -Dietary precautions.  -Personal protective measures to prevent insect-borne diseases (e.g., malaria, dengue).  -Precautions to prevent exposure to rabies and seek treatment for possible exposures.  -Precautions against sun exposure.  -Precautions against development of DVT during flight.  -Personal and travel safety.    Vaccinations:  The patient's immunization history was reviewed and, based on the patient's itinerary, the following immunizations were  ordered:    - yellow fever (YF-VAX) injection 0.5 mL  - mening vac A,C,Y,W135 dip (PF) (MENVEO) 10-5 mcg/0.5 mL vaccine (PREFERRED)(10 - 56 YO) 0.5 mL  - typhoid polysaccharide injection Soln 0.5 mL  - COVID-19 (Pfizer) 30 mcg/0.3 mL IM vaccine (>/= 11 yo) 0.3 mL  - influenza (Flulaval, Fluzone, Fluarix) 45 mcg/0.5 mL IM vaccine (> or = 6 mo) 0.5 mL  - Tdap (BOOSTRIX) vaccine injection 0.5 mL    The patient was encouraged to contact us about any problems that may develop after immunization and possible side effects were reviewed.      Traveler's diarrhea:  The patient was instructed to purchase Imodium over the counter to take in case diarrhea (without blood or fever) develops.     Azithromycin was ordered for treatment if severe or bloody diarrhea develops and the patient was instructed on use and possible side effects.  - Azithromycin 500 mg PO x 3 days    Insect precautions:   The patient was also instructed to purchase insect repellent containing DEET or [place] and apply according to repellent label instructions.      An anti-malarial agent was prescribed for malaria prophylaxis and possible side effects were reviewed.  - Atovaquone-proguanil 250-100 mg PO qdaily, start 2 days before expected exposure and end 7 days after last day of exposure.        The patient was instructed to contact us if problems develop after travel.           This patient's visit complexity is inherent to evaluation and management associated with medical care services that are part of ongoing care related to this patient's single, serious condition or complex condition.       Shae Tovar DO  Infectious Disease Fellow, PGY-5

## 2024-10-17 ENCOUNTER — OFFICE VISIT (OUTPATIENT)
Dept: INTERNAL MEDICINE | Facility: CLINIC | Age: 49
End: 2024-10-17
Attending: FAMILY MEDICINE
Payer: COMMERCIAL

## 2024-10-17 VITALS
WEIGHT: 297.63 LBS | DIASTOLIC BLOOD PRESSURE: 80 MMHG | HEART RATE: 78 BPM | BODY MASS INDEX: 37.01 KG/M2 | SYSTOLIC BLOOD PRESSURE: 144 MMHG | HEIGHT: 75 IN | OXYGEN SATURATION: 98 %

## 2024-10-17 DIAGNOSIS — Z71.1 CONCERN ABOUT SEXUALLY TRANSMITTED DISEASE IN MALE WITHOUT DIAGNOSIS: ICD-10-CM

## 2024-10-17 DIAGNOSIS — E78.2 MIXED HYPERLIPIDEMIA: ICD-10-CM

## 2024-10-17 DIAGNOSIS — I10 HYPERTENSION, ESSENTIAL: Primary | ICD-10-CM

## 2024-10-17 PROCEDURE — 99213 OFFICE O/P EST LOW 20 MIN: CPT | Mod: S$GLB,,, | Performed by: FAMILY MEDICINE

## 2024-10-17 PROCEDURE — 99999 PR PBB SHADOW E&M-EST. PATIENT-LVL IV: CPT | Mod: PBBFAC,,, | Performed by: FAMILY MEDICINE

## 2024-10-17 RX ORDER — AMLODIPINE BESYLATE 10 MG/1
10 TABLET ORAL DAILY
Qty: 90 TABLET | Refills: 0 | Status: SHIPPED | OUTPATIENT
Start: 2024-10-17

## 2024-10-17 NOTE — PROGRESS NOTES
Subjective:       Patient ID: Daniel Garcias is a 49 y.o. male.    Chief Complaint: Annual Exam    Established patient follows up for management of chronic medical illnesses with complaints today. Please see dictation and ROS for interval problems, specific complaints and disease management discussion.    Past, Surgical, Family, Social, Histories; Medications, allergies reviewed and reconciled.  Health maintenance file reviewed and addressed items due. Recent applicable lab, imaging and cardiovascular results reviewed.  Problem list items reviewed and modified or added entries (in the overview section) may not be transcribed into this encounter note due to note writer format.      Blood pressure follow-up.  Was started on amlodipine 5 mg a few months ago.  No side effects.  Blood pressure at home may have been a little high.  States he took 2 and had a home blood pressure reading in the 130s.  Desires STD testing without stated exposure or symptoms.      Review of Systems   Constitutional:  Negative for appetite change, chills, diaphoresis, fatigue and fever.   HENT:  Negative for congestion, postnasal drip, rhinorrhea, sore throat and trouble swallowing.    Eyes:  Negative for visual disturbance.   Respiratory:  Negative for cough, choking, chest tightness, shortness of breath and wheezing.    Cardiovascular:  Negative for chest pain and leg swelling.   Gastrointestinal:  Negative for abdominal distention, abdominal pain, diarrhea, nausea and vomiting.   Genitourinary:  Negative for difficulty urinating and hematuria.   Musculoskeletal:  Positive for myalgias. Negative for arthralgias.        Shots yesterday   Skin:  Negative for rash.   Neurological:  Negative for weakness, light-headedness and headaches.   Psychiatric/Behavioral:  Negative for confusion and dysphoric mood.        Objective:      Physical Exam  Vitals and nursing note reviewed.   Constitutional:       General: He is not in acute distress.      Appearance: He is well-developed. He is not diaphoretic.   HENT:      Head: Normocephalic and atraumatic.   Eyes:      General: No scleral icterus.     Conjunctiva/sclera: Conjunctivae normal.   Cardiovascular:      Rate and Rhythm: Normal rate and regular rhythm.      Heart sounds: Normal heart sounds. No murmur heard.     No friction rub. No gallop.   Pulmonary:      Effort: Pulmonary effort is normal. No respiratory distress.      Breath sounds: Normal breath sounds. No wheezing or rales.   Abdominal:      General: There is no distension.      Tenderness: There is no abdominal tenderness.   Musculoskeletal:         General: No deformity.      Cervical back: Normal range of motion and neck supple.      Right lower leg: No edema.      Left lower leg: No edema.   Skin:     General: Skin is warm and dry.      Findings: No erythema or rash.   Neurological:      Mental Status: He is alert and oriented to person, place, and time.      Cranial Nerves: No cranial nerve deficit.      Motor: No tremor.      Coordination: Coordination normal.      Gait: Gait normal.   Psychiatric:         Behavior: Behavior normal.         Thought Content: Thought content normal.         Judgment: Judgment normal.         Assessment:       1. Hypertension, essential    2. Mixed hyperlipidemia    3. Concern about sexually transmitted disease in male without diagnosis        Plan:     Medication List with Changes/Refills   Current Medications    ATORVASTATIN (LIPITOR) 40 MG TABLET    Take 1 tablet (40 mg total) by mouth once daily.    ATOVAQUONE-PROGUANIL (MALARONE) 250-100 MG TAB    Take 1 tablet by mouth once daily. for 16 days    AZITHROMYCIN (ZITHROMAX) 500 MG TABLET    Take 1 tablet (500 mg total) by mouth once daily. for 3 days    MULTIVITAMIN (THERAGRAN) PER TABLET    Take 1 tablet by mouth once daily.    SILDENAFIL (VIAGRA) 100 MG TABLET    Take 1 tablet (100 mg total) by mouth daily as needed for Erectile Dysfunction.   Changed and/or  Refilled Medications    Modified Medication Previous Medication    AMLODIPINE (NORVASC) 10 MG TABLET amLODIPine (NORVASC) 5 MG tablet       Take 1 tablet (10 mg total) by mouth once daily.    TAKE 1 TABLET(5 MG) BY MOUTH DAILY     1. Hypertension, essential  Assessment & Plan:  -increase amlodipine to 10, beware orthostasis  -RTC month    Orders:  -     Comprehensive Metabolic Panel; Future; Expected date: 10/17/2024  -     amLODIPine (NORVASC) 10 MG tablet; Take 1 tablet (10 mg total) by mouth once daily.  Dispense: 90 tablet; Refill: 0    2. Mixed hyperlipidemia  Overview:  -[facility] -206 ([id number])    Orders:  -     Lipid Panel; Future; Expected date: 10/17/2024    3. Concern about sexually transmitted disease in male without diagnosis  -     HIV 1/2 Ag/Ab (4th Gen); Future; Expected date: 10/17/2024  -     C. trachomatis/N. gonorrhoeae by AMP DNA; Future; Expected date: 10/17/2024  -     Treponema Pallidium Antibodies IgG, IgM; Future; Expected date: 10/17/2024      See meds, orders, follow up, routing and instructions sections of encounter and AVS. Discussed with patient and provided on AVS.    Discussed diet and exercise as therapeutic modalities for metabolic and other conditions. Provided patient information, which are included as links on the AVS for detailed information.    Lab Results   Component Value Date     03/16/2024    K 4.1 03/16/2024     03/16/2024    BUN 13 03/16/2024    CREATININE 1.1 03/16/2024    GLU 92 03/16/2024    HGBA1C 5.3 07/27/2022    MG 2.3 11/19/2007    AST 10 06/18/2024    ALT 20 06/18/2024    ALBUMIN 3.6 03/16/2024    PROT 6.8 03/16/2024    BILITOT 0.7 03/16/2024    CHOL 205 (H) 06/18/2024    HDL 57 06/18/2024    LDLCALC 137.2 06/18/2024    TRIG 54 06/18/2024    WBC 4.18 01/25/2023    HGB 14.6 01/25/2023    HCT 45.7 01/25/2023     01/25/2023    PSA 0.66 03/16/2024    TSH 0.616 07/27/2022       RTC one-month for blood pressure recheck.  Increase amlodipine 10  mg, orthostatic side effects discussed

## 2024-10-18 ENCOUNTER — LAB VISIT (OUTPATIENT)
Dept: LAB | Facility: HOSPITAL | Age: 49
End: 2024-10-18
Attending: FAMILY MEDICINE
Payer: COMMERCIAL

## 2024-10-18 DIAGNOSIS — E78.2 MIXED HYPERLIPIDEMIA: ICD-10-CM

## 2024-10-18 DIAGNOSIS — I10 HYPERTENSION, ESSENTIAL: ICD-10-CM

## 2024-10-18 DIAGNOSIS — Z71.1 CONCERN ABOUT SEXUALLY TRANSMITTED DISEASE IN MALE WITHOUT DIAGNOSIS: ICD-10-CM

## 2024-10-18 LAB
ALBUMIN SERPL BCP-MCNC: 3.9 G/DL (ref 3.5–5.2)
ALP SERPL-CCNC: 63 U/L (ref 40–150)
ALT SERPL W/O P-5'-P-CCNC: 19 U/L (ref 10–44)
ANION GAP SERPL CALC-SCNC: 9 MMOL/L (ref 8–16)
AST SERPL-CCNC: 10 U/L (ref 10–40)
BILIRUB SERPL-MCNC: 0.7 MG/DL (ref 0.1–1)
BUN SERPL-MCNC: 11 MG/DL (ref 6–20)
CALCIUM SERPL-MCNC: 9.9 MG/DL (ref 8.7–10.5)
CHLORIDE SERPL-SCNC: 102 MMOL/L (ref 95–110)
CHOLEST SERPL-MCNC: 188 MG/DL (ref 120–199)
CHOLEST/HDLC SERPL: 3.8 {RATIO} (ref 2–5)
CO2 SERPL-SCNC: 28 MMOL/L (ref 23–29)
CREAT SERPL-MCNC: 1.1 MG/DL (ref 0.5–1.4)
EST. GFR  (NO RACE VARIABLE): >60 ML/MIN/1.73 M^2
GLUCOSE SERPL-MCNC: 94 MG/DL (ref 70–110)
HDLC SERPL-MCNC: 50 MG/DL (ref 40–75)
HDLC SERPL: 26.6 % (ref 20–50)
HIV 1+2 AB+HIV1 P24 AG SERPL QL IA: NORMAL
LDLC SERPL CALC-MCNC: 118.6 MG/DL (ref 63–159)
NONHDLC SERPL-MCNC: 138 MG/DL
POTASSIUM SERPL-SCNC: 4.1 MMOL/L (ref 3.5–5.1)
PROT SERPL-MCNC: 7.5 G/DL (ref 6–8.4)
SODIUM SERPL-SCNC: 139 MMOL/L (ref 136–145)
TREPONEMA PALLIDUM IGG+IGM AB [PRESENCE] IN SERUM OR PLASMA BY IMMUNOASSAY: NONREACTIVE
TRIGL SERPL-MCNC: 97 MG/DL (ref 30–150)

## 2024-10-18 PROCEDURE — 80061 LIPID PANEL: CPT | Performed by: FAMILY MEDICINE

## 2024-10-18 PROCEDURE — 87389 HIV-1 AG W/HIV-1&-2 AB AG IA: CPT | Performed by: FAMILY MEDICINE

## 2024-10-18 PROCEDURE — 36415 COLL VENOUS BLD VENIPUNCTURE: CPT | Performed by: FAMILY MEDICINE

## 2024-10-18 PROCEDURE — 86593 SYPHILIS TEST NON-TREP QUANT: CPT | Performed by: FAMILY MEDICINE

## 2024-10-18 PROCEDURE — 80053 COMPREHEN METABOLIC PANEL: CPT | Performed by: FAMILY MEDICINE

## 2024-10-25 ENCOUNTER — TELEPHONE (OUTPATIENT)
Dept: NEUROSURGERY | Facility: CLINIC | Age: 49
End: 2024-10-25
Payer: COMMERCIAL

## 2024-10-25 NOTE — TELEPHONE ENCOUNTER
Called patient to confirm next appointment with Saad Gordon MD .  No response, LVM with date and time.  Future Appointments   Date Time Provider Department Center   10/28/2024  9:30 AM Saad Gordon MD [facility]   10/29/2024  8:30 AM [facility]   11/18/2024  8:00 AM Donte Izquierdo MD [facility] Roni [facility]

## 2024-10-28 ENCOUNTER — OFFICE VISIT (OUTPATIENT)
Dept: NEUROSURGERY | Facility: CLINIC | Age: 49
End: 2024-10-28
Payer: COMMERCIAL

## 2024-10-28 VITALS — SYSTOLIC BLOOD PRESSURE: 150 MMHG | HEART RATE: 56 BPM | DIASTOLIC BLOOD PRESSURE: 82 MMHG

## 2024-10-28 DIAGNOSIS — M54.9 DORSALGIA: ICD-10-CM

## 2024-10-28 DIAGNOSIS — R20.2 PARESTHESIA OF RIGHT LEG: Primary | ICD-10-CM

## 2024-10-28 PROCEDURE — 99213 OFFICE O/P EST LOW 20 MIN: CPT | Mod: S$GLB,,, | Performed by: NEUROLOGICAL SURGERY

## 2024-10-28 PROCEDURE — 99999 PR PBB SHADOW E&M-EST. PATIENT-LVL II: CPT | Mod: PBBFAC,,, | Performed by: NEUROLOGICAL SURGERY

## 2024-11-19 ENCOUNTER — OFFICE VISIT (OUTPATIENT)
Dept: INTERNAL MEDICINE | Facility: CLINIC | Age: 49
End: 2024-11-19
Attending: FAMILY MEDICINE
Payer: COMMERCIAL

## 2024-11-19 ENCOUNTER — LAB VISIT (OUTPATIENT)
Dept: LAB | Facility: HOSPITAL | Age: 49
End: 2024-11-19
Attending: FAMILY MEDICINE
Payer: COMMERCIAL

## 2024-11-19 VITALS
OXYGEN SATURATION: 98 % | DIASTOLIC BLOOD PRESSURE: 85 MMHG | HEIGHT: 75 IN | WEIGHT: 291.25 LBS | BODY MASS INDEX: 36.21 KG/M2 | HEART RATE: 78 BPM | SYSTOLIC BLOOD PRESSURE: 148 MMHG

## 2024-11-19 DIAGNOSIS — N48.89 PENILE SWELLING: ICD-10-CM

## 2024-11-19 DIAGNOSIS — I10 HYPERTENSION, ESSENTIAL: Primary | ICD-10-CM

## 2024-11-19 DIAGNOSIS — E78.5 HYPERLIPIDEMIA, UNSPECIFIED HYPERLIPIDEMIA TYPE: ICD-10-CM

## 2024-11-19 LAB
HIV 1+2 AB+HIV1 P24 AG SERPL QL IA: NORMAL
TREPONEMA PALLIDUM IGG+IGM AB [PRESENCE] IN SERUM OR PLASMA BY IMMUNOASSAY: NONREACTIVE

## 2024-11-19 PROCEDURE — 99999 PR PBB SHADOW E&M-EST. PATIENT-LVL IV: CPT | Mod: PBBFAC,,, | Performed by: FAMILY MEDICINE

## 2024-11-19 PROCEDURE — 86593 SYPHILIS TEST NON-TREP QUANT: CPT | Performed by: FAMILY MEDICINE

## 2024-11-19 PROCEDURE — 36415 COLL VENOUS BLD VENIPUNCTURE: CPT | Performed by: FAMILY MEDICINE

## 2024-11-19 PROCEDURE — 87389 HIV-1 AG W/HIV-1&-2 AB AG IA: CPT | Performed by: FAMILY MEDICINE

## 2024-11-19 PROCEDURE — 99214 OFFICE O/P EST MOD 30 MIN: CPT | Mod: S$GLB,,, | Performed by: FAMILY MEDICINE

## 2024-11-19 RX ORDER — HYDROCHLOROTHIAZIDE 12.5 MG/1
12.5 TABLET ORAL DAILY
Qty: 90 TABLET | Refills: 1 | Status: SHIPPED | OUTPATIENT
Start: 2024-11-19

## 2024-11-19 RX ORDER — CEPHALEXIN 500 MG/1
500 CAPSULE ORAL 4 TIMES DAILY
Qty: 28 CAPSULE | Refills: 0 | Status: SHIPPED | OUTPATIENT
Start: 2024-11-19 | End: 2024-11-19 | Stop reason: SDUPTHER

## 2024-11-19 RX ORDER — CEPHALEXIN 500 MG/1
500 CAPSULE ORAL 4 TIMES DAILY
Qty: 28 CAPSULE | Refills: 0 | Status: SHIPPED | OUTPATIENT
Start: 2024-11-19 | End: 2024-11-26

## 2024-11-19 RX ORDER — ATORVASTATIN CALCIUM 40 MG/1
20 TABLET, FILM COATED ORAL DAILY
Start: 2024-11-19

## 2024-11-19 NOTE — PROGRESS NOTES
Subjective:       Patient ID: Daniel Garcias is a 49 y.o. male.    Chief Complaint: Follow-up    Established patient follows up for management of chronic medical illnesses with complaints today. Please see dictation and ROS for interval problems, specific complaints and disease management discussion.    Past, Surgical, Family, Social, Histories; Medications, allergies reviewed and reconciled.  Health maintenance file reviewed and addressed items due. Recent applicable lab, imaging and cardiovascular results reviewed.  Problem list items reviewed and modified or added entries (in the overview section) may not be transcribed into this encounter note due to note writer format.      Blood pressure follow-up today.  Taking amlodipine 5 mg without complaint.    We increase Lipitor dose to mg by message.  Patient states still taking 10.  At 1 point significant elevation of LDL, prompting initiation of Lipitor 10 mg.  Will compromised a 20 mg at this time repeat labs in about 3 months.    Was in [facility] and reports diarrhea from malaria medication.  Took 2 days prior but discontinued during the middle of the course.  No suggestive symptoms at this time such as fever, chills, night sweats.    Abrasion to left penile shaft recently while shaving, subsequent SA.  Does not feel like it is necessarily infected, no drainage, purulence warmth or redness noted.  Mass effect, no obvious abrasion at this time.  He would like to be retested for STD.  No dysuria, drainage, etc..  Laboratory last month was within normal limits.        Review of Systems   Constitutional:  Negative for appetite change, chills, diaphoresis, fatigue and fever.   HENT:  Negative for congestion, postnasal drip, rhinorrhea, sore throat and trouble swallowing.    Eyes:  Negative for visual disturbance.   Respiratory:  Negative for cough, choking, chest tightness, shortness of breath and wheezing.    Cardiovascular:  Negative for chest pain and leg swelling.    Gastrointestinal:  Negative for abdominal distention, abdominal pain, diarrhea, nausea and vomiting.        Diarrhea resolved   Genitourinary:  Positive for penile swelling. Negative for difficulty urinating and hematuria.   Musculoskeletal:  Negative for arthralgias and myalgias.   Skin:  Negative for rash.   Neurological:  Negative for weakness, light-headedness and headaches.   Psychiatric/Behavioral:  Negative for confusion and dysphoric mood.        Objective:      Physical Exam  Vitals and nursing note reviewed.   Constitutional:       General: He is not in acute distress.     Appearance: He is well-developed.   Pulmonary:      Effort: Pulmonary effort is normal.   Genitourinary:      Musculoskeletal:      Cervical back: Neck supple.      Right lower leg: No edema.      Left lower leg: No edema.   Skin:     General: Skin is warm and dry.      Findings: No rash.   Neurological:      Mental Status: He is alert and oriented to person, place, and time.   Psychiatric:         Behavior: Behavior normal.         Thought Content: Thought content normal.         Judgment: Judgment normal.         Assessment:       1. Hypertension, essential    2. Hyperlipidemia, unspecified hyperlipidemia type    3. Penile swelling        Plan:     Medication List with Changes/Refills   New Medications    CEPHALEXIN (KEFLEX) 500 MG CAPSULE    Take 1 capsule (500 mg total) by mouth 4 (four) times daily. for 7 days    HYDROCHLOROTHIAZIDE (HYDRODIURIL) 12.5 MG TAB    Take 1 tablet (12.5 mg total) by mouth once daily.   Current Medications    AMLODIPINE (NORVASC) 10 MG TABLET    Take 1 tablet (10 mg total) by mouth once daily.    MULTIVITAMIN (THERAGRAN) PER TABLET    Take 1 tablet by mouth once daily.   Changed and/or Refilled Medications    Modified Medication Previous Medication    ATORVASTATIN (LIPITOR) 40 MG TABLET atorvastatin (LIPITOR) 40 MG tablet       Take 0.5 tablets (20 mg total) by mouth once daily.    Take 1 tablet (40 mg  total) by mouth once daily.   Discontinued Medications    SILDENAFIL (VIAGRA) 100 MG TABLET    Take 1 tablet (100 mg total) by mouth daily as needed for Erectile Dysfunction.     1. Hypertension, essential  -     hydroCHLOROthiazide (HYDRODIURIL) 12.5 MG Tab; Take 1 tablet (12.5 mg total) by mouth once daily.  Dispense: 90 tablet; Refill: 1    2. Hyperlipidemia, unspecified hyperlipidemia type  Overview:  -[facility] -206 ([id number])    Assessment & Plan:  -unclear about current dosing, we switched to 40 mg in response to hi LDL few months ago. Discussed and take 1/2 for 20 mg and recheck in 3 month.    Orders:  -     Lipid Panel; Future; Expected date: 02/19/2025  -     AST (SGOT); Future; Expected date: 02/19/2025  -     ALT (SGPT); Future; Expected date: 02/19/2025  -     atorvastatin (LIPITOR) 40 MG tablet; Take 0.5 tablets (20 mg total) by mouth once daily.    3. Penile swelling  -     Ambulatory referral/consult to Urology; Future; Expected date: 11/26/2024  -     Discontinue: cephALEXin (KEFLEX) 500 MG capsule; Take 1 capsule (500 mg total) by mouth 4 (four) times daily. for 7 days  Dispense: 28 capsule; Refill: 0  -     HIV 1/2 Ag/Ab (4th Gen); Future; Expected date: 11/19/2024  -     C. trachomatis/N. gonorrhoeae by AMP DNA; Future; Expected date: 11/19/2024  -     Treponema Pallidium Antibodies IgG, IgM; Future; Expected date: 11/19/2024  -     cephALEXin (KEFLEX) 500 MG capsule; Take 1 capsule (500 mg total) by mouth 4 (four) times daily. for 7 days  Dispense: 28 capsule; Refill: 0      See meds, orders, follow up, routing and instructions sections of encounter and AVS. Discussed with patient and provided on AVS.    Lab Results   Component Value Date     10/18/2024    K 4.1 10/18/2024     10/18/2024    BUN 11 10/18/2024    CREATININE 1.1 10/18/2024    GLU 94 10/18/2024    HGBA1C 5.3 07/27/2022    MG 2.3 11/19/2007    AST 10 10/18/2024    ALT 19 10/18/2024    ALBUMIN 3.9 10/18/2024    PROT  7.5 10/18/2024    BILITOT 0.7 10/18/2024    CHOL 188 10/18/2024    HDL 50 10/18/2024    LDLCALC 118.6 10/18/2024    TRIG 97 10/18/2024    WBC 4.18 01/25/2023    HGB 14.6 01/25/2023    HCT 45.7 01/25/2023     01/25/2023    PSA 0.66 03/16/2024    TSH 0.616 07/27/2022         RTC one-month BP recheck.  Orthostatic side effect potential for diuretic discussed.

## 2024-11-19 NOTE — PROGRESS NOTES
Subjective:       Patient ID: Daniel Garcias is a 49 y.o. male.    Chief Complaint: penile swelling     This is a 49 y.o.  male patient that is new to me.  The patient was referred to me by Dr. Izquierdo for penile swelling. Reports that he saw his PCP yesterday, ordered some tests, chlamydia and gonorrhea are still in process. Reports that PCP gave 7 days of keflex. Reports that about a week ago he nicked his penis and then had sexual intercourse the next day. After sexual intercourse he noticed swelling his penis. Reports that the abrasion is no longer there. Denies discharge, pain to the area, fevers, chills, LUTS, concern for STD.     LAST PSA  Lab Results   Component Value Date    PSA 0.66 03/16/2024    PSA 0.73 01/25/2023    PSA 0.58 02/11/2021    PSA 0.45 06/29/2019    PSA 0.59 06/04/2018       Lab Results   Component Value Date    CREATININE 1.1 10/18/2024       ---  PMH/PSH/Medications/Allergies/Social history reviewed and as in chart.    Review of Systems   Constitutional:  Negative for activity change, chills and fever.   Respiratory:  Negative for shortness of breath.    Cardiovascular:  Negative for chest pain and palpitations.   Gastrointestinal:  Negative for abdominal pain and constipation.   Genitourinary:  Positive for penile swelling. Negative for difficulty urinating, dysuria, flank pain, frequency, hematuria, penile discharge, penile pain and urgency.   Neurological:  Negative for dizziness and light-headedness.       Objective:      Physical Exam  HENT:      Head: Normocephalic.   Pulmonary:      Effort: Pulmonary effort is normal.   Genitourinary:     Penis: Normal and circumcised.       Comments: Penile thrombosis noted to left side of penis, no abrasion noted   Musculoskeletal:         General: Normal range of motion.      Cervical back: Normal range of motion.   Skin:     General: Skin is warm and dry.   Neurological:      Mental Status: He is alert and oriented to person, place, and time.          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 Refrain from sexual intercourse for at least one week.  Can take ibuprofen if pain occurs.  Can continue keflex course  Follow-up 2 weeks for virtual visit    ANNA Elliott    I spent a total of 20 minutes on the day of the visit.This includes face to face time and non-face to face time preparing to see the patient (eg, review of tests), obtaining and/or reviewing separately obtained history, documenting clinical information in the electronic or other health record, independently interpreting results and communicating results to the patient/family/caregiver, or care coordinator.

## 2024-11-19 NOTE — ASSESSMENT & PLAN NOTE
-unclear about current dosing, we switched to 40 mg in response to hi LDL few months ago. Discussed and take 1/2 for 20 mg and recheck in 3 month.

## 2024-11-20 ENCOUNTER — OFFICE VISIT (OUTPATIENT)
Dept: UROLOGY | Facility: CLINIC | Age: 49
End: 2024-11-20
Payer: COMMERCIAL

## 2024-11-20 VITALS
HEART RATE: 62 BPM | WEIGHT: 294.44 LBS | DIASTOLIC BLOOD PRESSURE: 102 MMHG | SYSTOLIC BLOOD PRESSURE: 181 MMHG | BODY MASS INDEX: 36.61 KG/M2 | HEIGHT: 75 IN

## 2024-11-20 DIAGNOSIS — N48.81: Primary | ICD-10-CM

## 2024-11-20 DIAGNOSIS — N48.89 PENILE SWELLING: ICD-10-CM

## 2024-11-20 PROCEDURE — 99202 OFFICE O/P NEW SF 15 MIN: CPT | Mod: S$GLB,,,

## 2024-11-20 PROCEDURE — 99999 PR PBB SHADOW E&M-EST. PATIENT-LVL IV: CPT | Mod: PBBFAC,,,

## 2024-11-20 NOTE — PATIENT INSTRUCTIONS
Apply warm compresses 2 times per day for 20-30 minutes at a time for 2 weeks.  Can take ibuprofen if pain occurs.

## 2024-12-03 ENCOUNTER — PATIENT MESSAGE (OUTPATIENT)
Dept: INTERNAL MEDICINE | Facility: CLINIC | Age: 49
End: 2024-12-03
Payer: COMMERCIAL

## 2024-12-04 NOTE — TELEPHONE ENCOUNTER
Pt reports leg swelling and believes it may be due to rx of amlodipine 10 mg and HCTZ 12.5 mg that he is taking     Would like to know if he should switch to losartan    LOV 11/19

## 2024-12-19 ENCOUNTER — OFFICE VISIT (OUTPATIENT)
Dept: INTERNAL MEDICINE | Facility: CLINIC | Age: 49
End: 2024-12-19
Attending: FAMILY MEDICINE
Payer: COMMERCIAL

## 2024-12-19 VITALS
HEIGHT: 75 IN | BODY MASS INDEX: 37.15 KG/M2 | OXYGEN SATURATION: 98 % | DIASTOLIC BLOOD PRESSURE: 88 MMHG | HEART RATE: 62 BPM | WEIGHT: 298.75 LBS | SYSTOLIC BLOOD PRESSURE: 136 MMHG

## 2024-12-19 DIAGNOSIS — R20.0 RIGHT LEG NUMBNESS: ICD-10-CM

## 2024-12-19 DIAGNOSIS — H53.8 BLURRY VISION: ICD-10-CM

## 2024-12-19 DIAGNOSIS — I10 HYPERTENSION, ESSENTIAL: Primary | ICD-10-CM

## 2024-12-19 PROCEDURE — 99999 PR PBB SHADOW E&M-EST. PATIENT-LVL IV: CPT | Mod: PBBFAC,,, | Performed by: FAMILY MEDICINE

## 2024-12-19 PROCEDURE — 99213 OFFICE O/P EST LOW 20 MIN: CPT | Mod: S$GLB,,, | Performed by: FAMILY MEDICINE

## 2024-12-19 RX ORDER — NIFEDIPINE 60 MG/1
60 TABLET, EXTENDED RELEASE ORAL DAILY
Qty: 90 TABLET | Refills: 3 | Status: SHIPPED | OUTPATIENT
Start: 2024-12-19

## 2024-12-19 NOTE — ASSESSMENT & PLAN NOTE
-3/29/2023  L5-S1 laminectomy and bilateral foraminotomies from MIS approach   -followed in PM and NS

## 2024-12-19 NOTE — ASSESSMENT & PLAN NOTE
-change amlod 10 to nifed 60 d/t leg swelling and titrate  -cont HCT 25, consider chlorthalidone in future    Rtc 6 weeks

## 2024-12-19 NOTE — PROGRESS NOTES
Subjective:       Patient ID: Daniel Garcias is a 49 y.o. male.    Chief Complaint: Leg Pain, Follow-up, and Hypertension    Established patient follows up for management of chronic medical illnesses with complaints today. Please see dictation and ROS for interval problems, specific complaints and disease management discussion.    Past, Surgical, Family, Social, Histories; Medications, allergies reviewed and reconciled.  Health maintenance file reviewed and addressed items due. Recent applicable lab, imaging and cardiovascular results reviewed.  Problem list items reviewed and modified or added entries (in the overview section) may not be transcribed into this encounter note due to note writer format.      BP recheck, reports LE edema, endo of day, generally resolving overnight. Home wrist cuff readings 140's. Unverified w/ office cuff.    I used a thigh cuff today, has a very large arm.    R foot and LE numbness persists.            Review of Systems   Constitutional:  Negative for appetite change, chills, diaphoresis, fatigue and fever.   HENT:  Negative for congestion, postnasal drip, rhinorrhea, sore throat and trouble swallowing.    Eyes:  Positive for visual disturbance.        Occ blurry   Respiratory:  Negative for cough, choking, chest tightness, shortness of breath and wheezing.    Cardiovascular:  Positive for leg swelling. Negative for chest pain.   Gastrointestinal:  Negative for abdominal distention, abdominal pain, diarrhea, nausea and vomiting.   Genitourinary:  Negative for difficulty urinating and hematuria.   Musculoskeletal:  Negative for arthralgias and myalgias.   Skin:  Negative for rash.   Neurological:  Positive for numbness. Negative for weakness, light-headedness and headaches.   Psychiatric/Behavioral:  Negative for confusion and dysphoric mood.        Objective:      Physical Exam  Vitals and nursing note reviewed.   Constitutional:       General: He is not in acute distress.      Appearance: He is well-developed.   Pulmonary:      Effort: Pulmonary effort is normal.   Musculoskeletal:      Cervical back: Neck supple.      Right lower leg: No edema.      Left lower leg: No edema.   Skin:     General: Skin is warm and dry.      Findings: No rash.   Neurological:      Mental Status: He is alert and oriented to person, place, and time.   Psychiatric:         Behavior: Behavior normal.         Thought Content: Thought content normal.         Judgment: Judgment normal.         Assessment:       1. Hypertension, essential    2. Right leg numbness    3. Blurry vision        Plan:     Medication List with Changes/Refills   New Medications    NIFEDIPINE (PROCARDIA-XL) 60 MG (OSM) 24 HR TABLET    Take 1 tablet (60 mg total) by mouth once daily.   Current Medications    ATORVASTATIN (LIPITOR) 40 MG TABLET    Take 0.5 tablets (20 mg total) by mouth once daily.    HYDROCHLOROTHIAZIDE (HYDRODIURIL) 25 MG TABLET    Take 1 tablet (25 mg total) by mouth once daily.    MULTIVITAMIN (THERAGRAN) PER TABLET    Take 1 tablet by mouth once daily.   Discontinued Medications    AMLODIPINE (NORVASC) 10 MG TABLET    Take 1 tablet (10 mg total) by mouth once daily.    CEPHALEXIN (KEFLEX) 500 MG CAPSULE    Take 1 capsule (500 mg total) by mouth 4 (four) times daily. for 7 days     1. Hypertension, essential  Assessment & Plan:  -change amlod 10 to nifed 60 d/t leg swelling and titrate  -cont HCT 25, consider chlorthalidone in future    Rtc 6 weeks    Orders:  -     NIFEdipine (PROCARDIA-XL) 60 MG (OSM) 24 hr tablet; Take 1 tablet (60 mg total) by mouth once daily.  Dispense: 90 tablet; Refill: 3    2. Right leg numbness  Assessment & Plan:  -3/29/2023  L5-S1 laminectomy and bilateral foraminotomies from MIS approach   -followed in PM and NS      3. Blurry vision  -     Ambulatory referral/consult to Optometry; Future; Expected date: 12/26/2024      See meds, orders, follow up, routing and instructions sections of  encounter and AVS. Discussed with patient and provided on AVS.    Discussed diet and exercise as therapeutic modalities for metabolic and other conditions. Provided patient information, which are included as links on the AVS for detailed information.    Lab Results   Component Value Date     10/18/2024    K 4.1 10/18/2024     10/18/2024    BUN 11 10/18/2024    CREATININE 1.1 10/18/2024    GLU 94 10/18/2024    HGBA1C 5.3 07/27/2022    MG 2.3 11/19/2007    AST 10 10/18/2024    ALT 19 10/18/2024    ALBUMIN 3.9 10/18/2024    PROT 7.5 10/18/2024    BILITOT 0.7 10/18/2024    CHOL 188 10/18/2024    HDL 50 10/18/2024    LDLCALC 118.6 10/18/2024    TRIG 97 10/18/2024    WBC 4.18 01/25/2023    HGB 14.6 01/25/2023    HCT 45.7 01/25/2023     01/25/2023    PSA 0.66 03/16/2024    TSH 0.616 07/27/2022

## 2025-01-22 ENCOUNTER — TELEPHONE (OUTPATIENT)
Dept: SPORTS MEDICINE | Facility: CLINIC | Age: 50
End: 2025-01-22
Payer: COMMERCIAL

## 2025-01-22 NOTE — TELEPHONE ENCOUNTER
Called and LVM.  Dr. Moran is no longer seeing patients at [facility]. Appointment is canceled. If they call back please just schedule them with another provider

## 2025-03-04 ENCOUNTER — PATIENT MESSAGE (OUTPATIENT)
Dept: INTERNAL MEDICINE | Facility: CLINIC | Age: 50
End: 2025-03-04
Payer: COMMERCIAL

## 2025-03-17 ENCOUNTER — OFFICE VISIT (OUTPATIENT)
Dept: INTERNAL MEDICINE | Facility: CLINIC | Age: 50
End: 2025-03-17
Attending: FAMILY MEDICINE
Payer: COMMERCIAL

## 2025-03-17 ENCOUNTER — TELEPHONE (OUTPATIENT)
Dept: INTERNAL MEDICINE | Facility: CLINIC | Age: 50
End: 2025-03-17

## 2025-03-17 DIAGNOSIS — Z12.5 PROSTATE CANCER SCREENING: ICD-10-CM

## 2025-03-17 DIAGNOSIS — I10 HYPERTENSION, ESSENTIAL: Primary | ICD-10-CM

## 2025-03-17 DIAGNOSIS — E78.5 HYPERLIPIDEMIA, UNSPECIFIED HYPERLIPIDEMIA TYPE: ICD-10-CM

## 2025-03-17 DIAGNOSIS — K64.9 HEMORRHOIDS, UNSPECIFIED HEMORRHOID TYPE: ICD-10-CM

## 2025-03-17 DIAGNOSIS — Z00.00 ANNUAL PHYSICAL EXAM: ICD-10-CM

## 2025-03-17 PROBLEM — N48.81 THROMBOSIS OF PENILE VEIN: Status: RESOLVED | Noted: 2024-11-20 | Resolved: 2025-03-17

## 2025-03-17 PROBLEM — N48.89 PENILE SWELLING: Status: RESOLVED | Noted: 2024-11-20 | Resolved: 2025-03-17

## 2025-03-17 PROCEDURE — 98005 SYNCH AUDIO-VIDEO EST LOW 20: CPT | Mod: 95,,, | Performed by: FAMILY MEDICINE

## 2025-03-17 NOTE — TELEPHONE ENCOUNTER
----- Message from Donte Izquierdo MD sent at 3/17/2025  1:20 PM CDT -----  Patient is scheduled for visit in about 6 weeks, end of April.  Please see orders and check PSA, lipids, CMP 1 or 2 days prior to visit.

## 2025-03-17 NOTE — Clinical Note
Patient is scheduled for visit in about 6 weeks, end of April.  Please see orders and check PSA, lipids, CMP 1 or 2 days prior to visit.

## 2025-03-17 NOTE — PROGRESS NOTES
Subjective:       Patient ID: Daniel Garcias is a 49 y.o. male.    Chief Complaint: No chief complaint on file.    The patient location is: [place]  The chief complaint leading to consultation is: HTN, anal bump    Visit type: audiovisual    Face to Face time with patient: 7  12 minutes of total time spent on the encounter, which includes face to face time and non-face to face time preparing to see the patient (eg, review of tests), Obtaining and/or reviewing separately obtained history, Documenting clinical information in the electronic or other health record, Independently interpreting results (not separately reported) and communicating results to the patient/family/caregiver, or Care coordination (not separately reported).         Each patient to whom he or she provides medical services by telemedicine is:  (1) informed of the relationship between the physician and patient and the respective role of any other health care provider with respect to management of the patient; and (2) notified that he or she may decline to receive medical services by telemedicine and may withdraw from such care at any time.    Notes:  Due for blood pressure follow-up.  States taking both medications at night, intermittent hydrochlorothiazide.  Some systolic readings at home in 150.  We have not calibrated his cuff recently.  No other symptoms such as headache leg swelling.  Last visit changed amlodipine to nifedipine for leg swelling.  Thinks may have hemorrhoid.  Small bump.  No bleeding.  Colonoscopy is up-to-date.  This followed being constipated and straining.    Discussed laboratory, he is interested annual PSA.  He is about due for annual physical, PSA.  Suggested a follow-up in about 6 weeks with laboratory ahead of time for annual physical examination and blood pressure recheck.    Conservative measures for possible hemorrhoid including soak in tub, stool softener, increased hydration.  CRS consult if not improving or for any  bleeding, etc.     Advised to bring home blood pressure cuff in next time comes in for visit.          Review of Systems   Constitutional:  Negative for activity change and unexpected weight change.   HENT:  Negative for hearing loss, rhinorrhea and trouble swallowing.    Eyes:  Negative for discharge and visual disturbance.   Respiratory:  Negative for chest tightness and wheezing.    Cardiovascular:  Negative for chest pain and palpitations.   Gastrointestinal:  Negative for blood in stool, constipation, diarrhea and vomiting.        ? hmeorrhoid   Endocrine: Negative for polydipsia and polyuria.   Genitourinary:  Negative for difficulty urinating, hematuria and urgency.   Musculoskeletal:  Negative for arthralgias, joint swelling and neck pain.   Neurological:  Negative for weakness and headaches.   Psychiatric/Behavioral:  Negative for confusion and dysphoric mood.        Objective:      Physical Exam  Constitutional:       General: He is not in acute distress.     Appearance: He is well-developed.   Neurological:      Mental Status: He is alert and oriented to person, place, and time.   Psychiatric:         Speech: Speech normal.         Behavior: Behavior normal.         Assessment:       1. Hypertension, essential    2. Hemorrhoids, unspecified hemorrhoid type    3. Annual physical exam    4. Prostate cancer screening    5. Hyperlipidemia, unspecified hyperlipidemia type        Plan:     Medication List with Changes/Refills   Current Medications    ATORVASTATIN (LIPITOR) 40 MG TABLET    Take 0.5 tablets (20 mg total) by mouth once daily.    HYDROCHLOROTHIAZIDE (HYDRODIURIL) 25 MG TABLET    Take 1 tablet (25 mg total) by mouth once daily.    MULTIVITAMIN (THERAGRAN) PER TABLET    Take 1 tablet by mouth once daily.    NIFEDIPINE (PROCARDIA-XL) 60 MG (OSM) 24 HR TABLET    Take 1 tablet (60 mg total) by mouth once daily.    SILDENAFIL (VIAGRA) 100 MG TABLET    Take 1 tablet (100 mg total) by mouth daily as needed  for Erectile Dysfunction.     1. Hypertension, essential  -     Comprehensive Metabolic Panel; Future; Expected date: 03/17/2025    2. Hemorrhoids, unspecified hemorrhoid type    3. Annual physical exam  -     Comprehensive Metabolic Panel; Future; Expected date: 03/17/2025  -     Lipid Panel; Future; Expected date: 03/17/2025  -     PSA, Screening; Future; Expected date: 03/17/2025    4. Prostate cancer screening  -     PSA, Screening; Future; Expected date: 03/17/2025    5. Hyperlipidemia, unspecified hyperlipidemia type  Overview:  -[facility] -206 ([id number])    Orders:  -     Lipid Panel; Future; Expected date: 03/17/2025      See meds, orders, follow up, routing and instructions sections of encounter and AVS. Discussed with patient and provided on AVS.    Lab Results   Component Value Date     10/18/2024    K 4.1 10/18/2024     10/18/2024    BUN 11 10/18/2024    CREATININE 1.1 10/18/2024    GLU 94 10/18/2024    HGBA1C 5.3 07/27/2022    MG 2.3 11/19/2007    AST 10 10/18/2024    ALT 19 10/18/2024    ALBUMIN 3.9 10/18/2024    PROT 7.5 10/18/2024    BILITOT 0.7 10/18/2024    CHOL 188 10/18/2024    HDL 50 10/18/2024    LDLCALC 118.6 10/18/2024    TRIG 97 10/18/2024    WBC 4.18 01/25/2023    HGB 14.6 01/25/2023    HCT 45.7 01/25/2023     01/25/2023    PSA 0.66 03/16/2024    TSH 0.616 07/27/2022

## 2025-04-11 DIAGNOSIS — I10 HYPERTENSION, ESSENTIAL: ICD-10-CM

## 2025-04-11 RX ORDER — HYDROCHLOROTHIAZIDE 25 MG/1
TABLET ORAL
Qty: 90 TABLET | Refills: 1 | Status: SHIPPED | OUTPATIENT
Start: 2025-04-11

## 2025-04-11 NOTE — TELEPHONE ENCOUNTER
No care due was identified.  [facility] Embedded Care Due Messages. Reference number: [id number].   4/11/2025 3:36:47 AM CDT

## 2025-04-21 ENCOUNTER — LAB VISIT (OUTPATIENT)
Dept: LAB | Facility: HOSPITAL | Age: 50
End: 2025-04-21
Attending: FAMILY MEDICINE
Payer: COMMERCIAL

## 2025-04-21 ENCOUNTER — RESULTS FOLLOW-UP (OUTPATIENT)
Dept: INTERNAL MEDICINE | Facility: CLINIC | Age: 50
End: 2025-04-21

## 2025-04-21 DIAGNOSIS — Z00.00 ANNUAL PHYSICAL EXAM: ICD-10-CM

## 2025-04-21 DIAGNOSIS — E78.5 HYPERLIPIDEMIA, UNSPECIFIED HYPERLIPIDEMIA TYPE: ICD-10-CM

## 2025-04-21 DIAGNOSIS — Z12.5 PROSTATE CANCER SCREENING: ICD-10-CM

## 2025-04-21 DIAGNOSIS — I10 HYPERTENSION, ESSENTIAL: ICD-10-CM

## 2025-04-21 LAB
ALBUMIN SERPL BCP-MCNC: 3.7 G/DL (ref 3.5–5.2)
ALP SERPL-CCNC: 59 UNIT/L (ref 40–150)
ALT SERPL W/O P-5'-P-CCNC: 23 UNIT/L (ref 10–44)
ANION GAP (OHS): 8 MMOL/L (ref 8–16)
AST SERPL-CCNC: 20 UNIT/L (ref 11–45)
BILIRUB SERPL-MCNC: 0.8 MG/DL (ref 0.1–1)
BUN SERPL-MCNC: 17 MG/DL (ref 6–20)
CALCIUM SERPL-MCNC: 9 MG/DL (ref 8.7–10.5)
CHLORIDE SERPL-SCNC: 107 MMOL/L (ref 95–110)
CHOLEST SERPL-MCNC: 193 MG/DL (ref 120–199)
CHOLEST/HDLC SERPL: 4.3 {RATIO} (ref 2–5)
CO2 SERPL-SCNC: 27 MMOL/L (ref 23–29)
CREAT SERPL-MCNC: 1.4 MG/DL (ref 0.5–1.4)
GFR SERPLBLD CREATININE-BSD FMLA CKD-EPI: >60 ML/MIN/1.73/M2
GLUCOSE SERPL-MCNC: 96 MG/DL (ref 70–110)
HDLC SERPL-MCNC: 45 MG/DL (ref 40–75)
HDLC SERPL: 23.3 % (ref 20–50)
LDLC SERPL CALC-MCNC: 135.6 MG/DL (ref 63–159)
NONHDLC SERPL-MCNC: 148 MG/DL
POTASSIUM SERPL-SCNC: 3.7 MMOL/L (ref 3.5–5.1)
PROT SERPL-MCNC: 7.3 GM/DL (ref 6–8.4)
PSA SERPL-MCNC: 0.58 NG/ML
SODIUM SERPL-SCNC: 142 MMOL/L (ref 136–145)
TRIGL SERPL-MCNC: 62 MG/DL (ref 30–150)

## 2025-04-21 PROCEDURE — 80061 LIPID PANEL: CPT

## 2025-04-21 PROCEDURE — 36415 COLL VENOUS BLD VENIPUNCTURE: CPT

## 2025-04-21 PROCEDURE — 84153 ASSAY OF PSA TOTAL: CPT

## 2025-04-21 PROCEDURE — 82565 ASSAY OF CREATININE: CPT

## 2025-04-28 ENCOUNTER — OFFICE VISIT (OUTPATIENT)
Dept: INTERNAL MEDICINE | Facility: CLINIC | Age: 50
End: 2025-04-28
Attending: FAMILY MEDICINE
Payer: COMMERCIAL

## 2025-04-28 VITALS
HEART RATE: 86 BPM | BODY MASS INDEX: 36.79 KG/M2 | WEIGHT: 294.31 LBS | SYSTOLIC BLOOD PRESSURE: 132 MMHG | DIASTOLIC BLOOD PRESSURE: 68 MMHG | OXYGEN SATURATION: 98 %

## 2025-04-28 DIAGNOSIS — Z00.00 ANNUAL PHYSICAL EXAM: Primary | ICD-10-CM

## 2025-04-28 DIAGNOSIS — R20.0 RIGHT LEG NUMBNESS: ICD-10-CM

## 2025-04-28 DIAGNOSIS — I10 HYPERTENSION, ESSENTIAL: ICD-10-CM

## 2025-04-28 DIAGNOSIS — E78.5 HYPERLIPIDEMIA, UNSPECIFIED HYPERLIPIDEMIA TYPE: ICD-10-CM

## 2025-04-28 PROCEDURE — 99396 PREV VISIT EST AGE 40-64: CPT | Mod: S$GLB,,, | Performed by: FAMILY MEDICINE

## 2025-04-28 PROCEDURE — 99999 PR PBB SHADOW E&M-EST. PATIENT-LVL III: CPT | Mod: PBBFAC,,, | Performed by: FAMILY MEDICINE

## 2025-04-28 RX ORDER — HYDROCHLOROTHIAZIDE 25 MG/1
25 TABLET ORAL DAILY
Qty: 90 TABLET | Refills: 3 | Status: SHIPPED | OUTPATIENT
Start: 2025-04-28

## 2025-04-28 RX ORDER — NIFEDIPINE 60 MG/1
60 TABLET, EXTENDED RELEASE ORAL DAILY
Qty: 90 TABLET | Refills: 3 | Status: SHIPPED | OUTPATIENT
Start: 2025-04-28

## 2025-04-28 RX ORDER — ATORVASTATIN CALCIUM 40 MG/1
40 TABLET, FILM COATED ORAL DAILY
Qty: 90 TABLET | Refills: 3
Start: 2025-04-28

## 2025-04-28 NOTE — PROGRESS NOTES
Subjective:       Patient ID: Daniel Garcias is a 49 y.o. male.    Chief Complaint: Follow-up    Established patient follows up for management of chronic medical illnesses with complaints today. Please see dictation and ROS for interval problems, specific complaints and disease management discussion.    Past, Surgical, Family, Social, Histories; Medications, allergies reviewed and reconciled.  Health maintenance file reviewed and addressed items due. Recent applicable lab, imaging and cardiovascular results reviewed.  Problem list items reviewed and modified or added entries (in the overview section) may not be transcribed into this encounter note due to note writer format.        Established patient for an annual wellness check/physical exam and also chronic disease management. Specific complaints - see dictation, M*model entries and please see ROS.  Past, Surgical, Family, Social Histories; Medications, Allergies reviewed and reconciled.  Health maintenance file reviewed and addressed items due. Recent applicable lab, imaging and cardiovascular results reviewed.  Problem list items reviewed and modified or added entries (in the overview section) may not be transcribed into this encounter note due to note writer format.      US v for URI and cough 4/13. Steroid shot and z pack. Some improvement, still coughing.        Review of Systems   Constitutional:  Negative for appetite change, chills, diaphoresis, fatigue and fever.   HENT:  Negative for congestion, postnasal drip, rhinorrhea, sore throat and trouble swallowing.    Eyes:  Negative for visual disturbance.   Respiratory:  Positive for cough. Negative for choking, chest tightness, shortness of breath and wheezing.    Cardiovascular:  Negative for chest pain and leg swelling.   Gastrointestinal:  Negative for abdominal distention, abdominal pain, diarrhea, nausea and vomiting.   Genitourinary:  Negative for difficulty urinating and hematuria.    Musculoskeletal:  Negative for arthralgias and myalgias.   Skin:  Negative for rash.   Neurological:  Negative for weakness, light-headedness and headaches.   Psychiatric/Behavioral:  Negative for confusion and dysphoric mood.        Objective:      Physical Exam  Vitals and nursing note reviewed.   Constitutional:       Appearance: He is well-developed. He is not diaphoretic.   Eyes:      General: No scleral icterus.  Neck:      Thyroid: No thyromegaly.      Vascular: No JVD.      Trachea: No tracheal deviation.   Cardiovascular:      Rate and Rhythm: Normal rate and regular rhythm.      Heart sounds: Normal heart sounds. No murmur heard.     No friction rub. No gallop.   Pulmonary:      Effort: Pulmonary effort is normal. No respiratory distress.      Breath sounds: Normal breath sounds. No wheezing or rales.   Abdominal:      General: There is no distension.      Palpations: Abdomen is soft. There is no mass.      Tenderness: There is no abdominal tenderness. There is no guarding or rebound.   Musculoskeletal:      Cervical back: Normal range of motion and neck supple.   Skin:     General: Skin is warm and dry.      Findings: No erythema or rash.   Neurological:      Mental Status: He is alert and oriented to person, place, and time.      Cranial Nerves: No cranial nerve deficit.      Motor: No tremor.      Coordination: Coordination normal.      Gait: Gait normal.   Psychiatric:         Behavior: Behavior normal.         Thought Content: Thought content normal.         Judgment: Judgment normal.         Assessment:       1. Annual physical exam    2. Hypertension, essential    3. Hyperlipidemia, unspecified hyperlipidemia type    4. Right leg numbness        Plan:     Medication List with Changes/Refills   Current Medications    MULTIVITAMIN (THERAGRAN) PER TABLET    Take 1 tablet by mouth once daily.    SILDENAFIL (VIAGRA) 100 MG TABLET    Take 1 tablet (100 mg total) by mouth daily as needed for Erectile  Dysfunction.   Changed and/or Refilled Medications    Modified Medication Previous Medication    ATORVASTATIN (LIPITOR) 40 MG TABLET atorvastatin (LIPITOR) 40 MG tablet       Take 1 tablet (40 mg total) by mouth once daily.    Take 0.5 tablets (20 mg total) by mouth once daily.    HYDROCHLOROTHIAZIDE (HYDRODIURIL) 25 MG TABLET hydroCHLOROthiazide (HYDRODIURIL) 25 MG tablet       Take 1 tablet (25 mg total) by mouth once daily.    TAKE 1 TABLET(25 MG) BY MOUTH DAILY    NIFEDIPINE (PROCARDIA-XL) 60 MG (OSM) 24 HR TABLET NIFEdipine (PROCARDIA-XL) 60 MG (OSM) 24 hr tablet       Take 1 tablet (60 mg total) by mouth once daily.    Take 1 tablet (60 mg total) by mouth once daily.     1. Annual physical exam    2. Hypertension, essential  -     hydroCHLOROthiazide (HYDRODIURIL) 25 MG tablet; Take 1 tablet (25 mg total) by mouth once daily.  Dispense: 90 tablet; Refill: 3  -     NIFEdipine (PROCARDIA-XL) 60 MG (OSM) 24 hr tablet; Take 1 tablet (60 mg total) by mouth once daily.  Dispense: 90 tablet; Refill: 3    3. Hyperlipidemia, unspecified hyperlipidemia type  Overview:  -[facility] -206 ([id number])    Assessment & Plan:  -, start taking whole 40 mg tab daily    Orders:  -     atorvastatin (LIPITOR) 40 MG tablet; Take 1 tablet (40 mg total) by mouth once daily.  Dispense: 90 tablet; Refill: 3    4. Right leg numbness  Assessment & Plan:  -leg numbness, rec NS f/un appt    Orders:  -     Ambulatory referral/consult to Neurosurgery; Future; Expected date: 05/05/2025      See meds, orders, follow up, routing and instructions sections of encounter and AVS. Discussed with patient and provided on AVS.    Discussed diet and exercise as therapeutic modalities for metabolic and other conditions. Provided patient information, which are included as links on the AVS for detailed information.    Lab Results   Component Value Date     04/21/2025     10/18/2024    K 3.7 04/21/2025    K 4.1 10/18/2024      04/21/2025     10/18/2024    BUN 17 04/21/2025    CREATININE 1.4 04/21/2025    GLU 94 10/18/2024    HGBA1C 5.3 07/27/2022    MG 2.3 11/19/2007    AST 20 04/21/2025    AST 10 10/18/2024    ALT 23 04/21/2025    ALT 19 10/18/2024    ALBUMIN 3.7 04/21/2025    ALBUMIN 3.9 10/18/2024    PROT 7.5 10/18/2024    BILITOT 0.8 04/21/2025    BILITOT 0.7 10/18/2024    CHOL 193 04/21/2025    CHOL 188 10/18/2024    HDL 45 04/21/2025    LDLCALC 118.6 10/18/2024    TRIG 62 04/21/2025    TRIG 97 10/18/2024    WBC 4.18 01/25/2023    HGB 14.6 01/25/2023    HCT 45.7 01/25/2023     01/25/2023    PSA 0.58 04/21/2025    PSA 0.66 03/16/2024    TSH 0.616 07/27/2022

## 2025-05-07 ENCOUNTER — PATIENT MESSAGE (OUTPATIENT)
Dept: INTERNAL MEDICINE | Facility: CLINIC | Age: 50
End: 2025-05-07
Payer: COMMERCIAL

## 2025-05-08 ENCOUNTER — RESULTS FOLLOW-UP (OUTPATIENT)
Dept: INTERNAL MEDICINE | Facility: CLINIC | Age: 50
End: 2025-05-08

## 2025-05-08 ENCOUNTER — OFFICE VISIT (OUTPATIENT)
Dept: INTERNAL MEDICINE | Facility: CLINIC | Age: 50
End: 2025-05-08
Payer: COMMERCIAL

## 2025-05-08 ENCOUNTER — HOSPITAL ENCOUNTER (OUTPATIENT)
Dept: RADIOLOGY | Facility: OTHER | Age: 50
Discharge: HOME | End: 2025-05-08
Attending: STUDENT IN AN ORGANIZED HEALTH CARE EDUCATION/TRAINING PROGRAM
Payer: COMMERCIAL

## 2025-05-08 VITALS
BODY MASS INDEX: 37.14 KG/M2 | DIASTOLIC BLOOD PRESSURE: 82 MMHG | HEART RATE: 62 BPM | OXYGEN SATURATION: 95 % | WEIGHT: 297.19 LBS | SYSTOLIC BLOOD PRESSURE: 146 MMHG

## 2025-05-08 DIAGNOSIS — R60.0 PERIPHERAL EDEMA: Primary | ICD-10-CM

## 2025-05-08 DIAGNOSIS — I10 HYPERTENSION, ESSENTIAL: ICD-10-CM

## 2025-05-08 DIAGNOSIS — R60.0 PERIPHERAL EDEMA: ICD-10-CM

## 2025-05-08 PROCEDURE — 99214 OFFICE O/P EST MOD 30 MIN: CPT | Mod: S$GLB,,, | Performed by: STUDENT IN AN ORGANIZED HEALTH CARE EDUCATION/TRAINING PROGRAM

## 2025-05-08 PROCEDURE — 99999 PR PBB SHADOW E&M-EST. PATIENT-LVL III: CPT | Mod: PBBFAC,,, | Performed by: STUDENT IN AN ORGANIZED HEALTH CARE EDUCATION/TRAINING PROGRAM

## 2025-05-08 PROCEDURE — 93970 EXTREMITY STUDY: CPT | Mod: TC

## 2025-05-08 PROCEDURE — 93970 EXTREMITY STUDY: CPT | Mod: 26,,, | Performed by: RADIOLOGY

## 2025-05-08 RX ORDER — LOSARTAN POTASSIUM 25 MG/1
25 TABLET ORAL DAILY
Qty: 90 TABLET | Refills: 3 | Status: SHIPPED | OUTPATIENT
Start: 2025-05-08 | End: 2026-05-08

## 2025-05-08 NOTE — PROGRESS NOTES
[facility] VISIT      CHIEF COMPLAINT:   Chief Complaint   Patient presents with    Leg Swelling    Foot Swelling       HISTORY OF PRESENT ILLNESS:     History of Present Illness    CHIEF COMPLAINT:  Patient presents today for leg swelling    LOWER EXTREMITY SYMPTOMS:  He reports bilateral leg swelling that worsens throughout the day and improves after sleep. Associated symptoms include skin peeling in the affected areas. He experienced left calf tenderness last week with pain below the knee. He reports recent air travel so concerned about a blood clot.    MEDICATIONS:  He had a recent medication change from amlodipine to nifedipine a couple months ago for his leg swelling which was initially helpful. He is compliant with nifedipine but admits he is not taking prescribed hydrochlorothiazide for the last month or so.      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   Constitutional:       General: He is not in acute distress.     Appearance: Normal appearance. He is not ill-appearing, toxic-appearing or diaphoretic.   HENT:      Head: Normocephalic and atraumatic.      Nose: Nose normal.   Eyes:      Extraocular Movements: Extraocular movements intact.       Conjunctiva/sclera: Conjunctivae normal.      Pupils: Pupils are equal, round, and reactive to light.   Cardiovascular:      Rate and Rhythm: Normal rate and regular rhythm.      Heart sounds: Normal heart sounds. No murmur heard.  Pulmonary:      Effort: Pulmonary effort is normal. No respiratory distress.      Breath sounds: Normal breath sounds. No wheezing.   Musculoskeletal:         General: No deformity. Normal range of motion.      Right lower leg: No tenderness. Edema present.      Left lower leg: No tenderness. Edema present.   Skin:     Findings: No lesion or rash.   Neurological:      General: No focal deficit present.      Mental Status: He is alert.      Motor: No weakness.      Gait: Gait normal.   Psychiatric:         Mood and Affect: Mood normal.         Behavior: Behavior normal.         Thought Content: Thought content normal.         Judgment: Judgment normal.               ASSESSMENT & PLAN:    Assessment & Plan    Leg swelling likely due to nifedipine and discontinuing hydrochlorothiazide.  Evaluated risk of blood clot due to recent air travel and calf pain, though pain has since resolved.  Discontinued nifedipine and restarted HCTZ to address BP and swelling.  Considered adding losartan if BP remains elevated on HCTZ alone.    1. Peripheral edema  - Patient reports leg swelling that worsens by day's end for past couple weeks.   - Patient reported tenderness in calf last week but denies current pain.  - Transitioned from amlodipine to nifedipine in the past due to leg swelling with initial improvement.   - Of note has not been taking hydrochlorothiazide for the last month or so.  - Recent history relevant for air travel.   - Discontinued nifedipine and restarted hydrochlorothiazide to manage both swelling and blood pressure.  - Explained that hydrochlorothiazide helps reduce fluid retention.  - Ordered leg ultrasound to rule out DVT given risk factors including recent air travel and history of  calf pain.  - Informed patient about warning signs of potential blood clots or vascular issues to monitor.  -     US Lower Extremity Veins Bilateral; Future; Expected date: 05/08/2025    2. Hypertension, essential  - Blood pressure above goal today 146/82  - Patient has been taking nifedipine but has not been taking hydrochlorothiazide for the last month or so.  - Recommend resuming hydrochlorothiazide and potentially adding losartan if blood pressure remains elevated.  - Patient to monitor blood pressure at home while adjusting medications.  -     losartan (COZAAR) 25 MG tablet; Take 1 tablet (25 mg total) by mouth once daily.  Dispense: 90 tablet; Refill: 3        Attestation:  This note was generated with the assistance of ambient listening technology. Verbal consent was obtained by the patient and accompanying visitor(s) for the recording of patient appointment to facilitate this note. I attest to having reviewed and edited the generated note for accuracy, though some syntax or spelling errors may persist. Please contact the author of this note for any clarification.           Yaz Han MD  [facility]         [1]   Current Outpatient Medications on File Prior to Visit   Medication Sig Dispense Refill    atorvastatin (LIPITOR) 40 MG tablet Take 1 tablet (40 mg total) by mouth once daily. 90 tablet 3    hydroCHLOROthiazide (HYDRODIURIL) 25 MG tablet Take 1 tablet (25 mg total) by mouth once daily. 90 tablet 3    multivitamin (THERAGRAN) per tablet Take 1 tablet by mouth once daily.      sildenafiL (VIAGRA) 100 MG tablet Take 1 tablet (100 mg total) by mouth daily as needed for Erectile Dysfunction. 30 tablet 2    [DISCONTINUED] NIFEdipine (PROCARDIA-XL) 60 MG (OSM) 24 hr tablet Take 1 tablet (60 mg total) by mouth once daily. 90 tablet 3     No current facility-administered medications on file prior to visit.

## 2025-05-08 NOTE — PATIENT INSTRUCTIONS
Imaging studies as detailed have been ordered. Please schedule at check out, online, or call [phone].     Stop nifedipine  Continue hydrochlorothiazide   Check blood pressure at home - if higher than 140/90, start losartan 25 mg daily   If blood pressure remains high, we can increase losartan dose

## 2025-05-16 ENCOUNTER — TELEPHONE (OUTPATIENT)
Dept: INTERNAL MEDICINE | Facility: CLINIC | Age: 50
End: 2025-05-16
Payer: COMMERCIAL

## 2025-05-16 DIAGNOSIS — R20.0 RIGHT LEG NUMBNESS: Primary | ICD-10-CM

## 2025-05-16 NOTE — TELEPHONE ENCOUNTER
----- Message from Malaika sent at 5/15/2025  4:58 PM CDT -----  Regarding: advise; Neurology referral  Contact: Patient @ [phone]  Good Afternoon. I wanted to advise the office that Neurosurgery department canceled patient's appointment with them on 05/13. Neurosurgery states that patient needs to see Neurology for dx: Right leg numbness [R20.0]. Please advise / assist with uploading referral for Neurology. Thank you.

## 2025-05-16 NOTE — TELEPHONE ENCOUNTER
Neurology consult placed.    Please note that this patient is postop Neurosurgery.  Curious why they would not see him as a follow-up.    Thank you

## 2025-06-10 ENCOUNTER — OFFICE VISIT (OUTPATIENT)
Dept: NEUROLOGY | Facility: CLINIC | Age: 50
End: 2025-06-10
Attending: PSYCHIATRY & NEUROLOGY
Payer: COMMERCIAL

## 2025-06-10 VITALS
HEIGHT: 75 IN | BODY MASS INDEX: 36.34 KG/M2 | SYSTOLIC BLOOD PRESSURE: 116 MMHG | HEART RATE: 65 BPM | WEIGHT: 292.25 LBS | DIASTOLIC BLOOD PRESSURE: 74 MMHG

## 2025-06-10 DIAGNOSIS — R20.0 RIGHT LEG NUMBNESS: ICD-10-CM

## 2025-06-10 DIAGNOSIS — E78.2 MIXED HYPERLIPIDEMIA: ICD-10-CM

## 2025-06-10 DIAGNOSIS — G50.0 TRIGEMINAL NEURALGIA: Primary | ICD-10-CM

## 2025-06-10 DIAGNOSIS — E55.9 VITAMIN D DEFICIENCY: ICD-10-CM

## 2025-06-10 DIAGNOSIS — R06.83 SNORING: ICD-10-CM

## 2025-06-10 PROCEDURE — 99999 PR PBB SHADOW E&M-EST. PATIENT-LVL III: CPT | Mod: PBBFAC,,, | Performed by: PSYCHIATRY & NEUROLOGY

## 2025-06-10 PROCEDURE — 99215 OFFICE O/P EST HI 40 MIN: CPT | Mod: S$GLB,,, | Performed by: PSYCHIATRY & NEUROLOGY

## 2025-06-10 RX ORDER — NIFEDIPINE 30 MG/1
60 TABLET, EXTENDED RELEASE ORAL DAILY
COMMUNITY

## 2025-06-10 NOTE — PATIENT INSTRUCTIONS
Thank you for visiting us at [facility].  You were seen by Dr. Sapna Parra.  You were seen for right leg numbness.  We will be recommending the following:    Neuropathic (nerve) pain:   -magnesium oxide 400mg daily (or magnesium glycinate), Coenzyme Q10 100mg TID  -discussed gabapentin    Snoring: concern for Obstructive Sleep Apnea (VIRA)  -Sleep referral  -sleep hygiene    Multi-factorial headache  -MVT, Vit D3 1000 Unit supplementation  -headaches:   -migraine journal   -magnesium oxide 400mg daily (or magnesium glycinate), Coenzyme Q10 100mg TID   -consider CBZ   -indomethacin PRN for severe headaches, diclofenac gel 1% 3-4 times as needed  -aroma therapy        Primary Stroke Prevention  - Plan for secondary prevention of stroke:  (a) Antiplatelet medication: deferred. (b)Cholesterol medication: atorvastatin 40mg daily, brainhealth, life style modifications . (c) anti-hypertensive medications: nifedipine, HCTZ   -goal LDL-C between 70 mg/dL (1.81 mmol/L) and 189 mg/dL (4.90 mmol/L)    -goal blood pressure is usually <140 systolic as well as <90 mmHg   -educated about healthy diet: low fat, avoid saturated fats, high in vegetables and fruits  -other life-style modifications:  weight reduction, especially in overweight/obese patients, salt restriction, and avoidance of excess alcohol intake      I went over the usual stroke warning signs and symptoms, and the need to activate EMS (call 911) as soon as the symptoms present.  - Sudden onset numbness or weakness of the face, arm, or leg; especially on one side of the body  - Sudden confusion, trouble speaking, or understanding  - Sudden trouble seeing in one or both eyes  - Sudden trouble walking, dizziness, loss of coordination  - Sudden severe headache with no known cause    -Brain Health lifestyle modifications:    -sleep hygiene   - diet: Mediterranean Diet    -exercise: 20-30 minutes of  Moderate exercise 3-5 times a week   -hearing  protection/health    -untreated hearing loss is related to dementia and falls    -Hearing loss is a direct link to dementia. Easier access to hearing aids could help : [employer]     -Wearing hearing aids may reduce the risk of falls in older adults : [employer] : [employer]    -Dental Health:     -Flossing may reduce risk for stroke and irregular heart rhythm  American Heart Association    -stress management reviewed   -smoking cessation, alcohol moderation    Check out my blog post for further information:  [email or web address]    Sleep Hygiene:    1. Avoid watching TV, eating, and discussing emotional issues in bed. The bed should be used for sleep and sex only. If not, we can associate the bed with other activities and it often becomes difficult to fall asleep.  2. Minimize noise, light, and temperature extremes during sleep with ear plugs, window blinds, or an electric blanket or air conditioner. Even the slightest nighttime noises or luminescent lights can disrupt the quality of your sleep. Try to keep your bedroom at a comfortable temperature -- not too hot (above 75 degrees) or too cold (below 54 degrees).  3. Try not to drink fluids after 8 p.m. This may reduce awakenings due to urination.  4. Avoid naps, but if you do nap, make it no more than about 25 minutes about eight hours after you awake. But if you have problems falling asleep, then no naps for you.  5. Do not expose your self to bright light if you need to get up at night. Use a small night-light instead.  6. Nicotine is a stimulant and should be avoided particularly near bedtime and upon night awakenings. Having a smoke before bed, although it may feel relaxing, is actually putting a stimulant into your bloodstream.  7. Caffeine is also a stimulant and is present in coffee (100-200 mg), soda (50-75 mg), tea (50-75 mg), and various over-the-counter medications. Caffeine should be discontinued at  least four to six hours before bedtime. If you consume large amounts of caffeine and you cut your self off too quickly, beware; you may get headaches that could keep you awake.  8. Although alcohol is a depressant and may help you fall asleep, the subsequent metabolism that clears it from your body when you are sleeping causes a withdrawal syndrome. This withdrawal causes awakenings and is often associated with nightmares and sweats.  9. A light snack may be sleep-inducing, but a heavy meal too close to bedtime interferes with sleep. Stay away from protein and stick to carbohydrates or dairy products. Milk contains the amino acid L-tryptophan, which has been shown in research to help people go to sleep. So milk and cookies or crackers (without chocolate) may be useful and taste good as well.            You may receive a survey about your experience at [facility].  We appreciate you taking the time to complete the survey to help us improve our service and care.

## 2025-06-10 NOTE — PROGRESS NOTES
Subjective:       Patient ID: Daniel Garcias is a 49 y.o. male.    Chief Complaint:  No chief complaint on file.    CC: Right leg numbness and tingling    History of Present Illness    46 RH AAM w/ HLP o/w healthy who presents for headache.  Pt was last seen on 2022 for right leg numbness and at that time we recommended:  45yo Male who  reports right leg numbness on his thigh with radiation to his toe occasionally.  He denies pain or weakness, specifically no pain in his back.  He denies falls or muscle loss.  He denies any medication or activity changes.  He has had episodes like this in the past but usually self resolve.  Current episode is persistent and impacting mobility.  No changes in bladder/bowel, sexual function. At that time we recommended MRI L spine w/o (food allergy), serum lab workup, PT, and NSAIDS. Headaches were resolved at that time. In the interval he underwent lumbar surgery in 3/2023 Underwent to [facility] in 2024. They provided minimal short relief. He denies any muscle loss, but occasional weakness. Described more often as numbness, tingling. Sensation has been pretty consistent over the months but can be up and down on a daily basis. Denies urinary or bladder incontinence. Has not tried NSAIDs or acetaminophen.   Headaches have improved, they occur once a week/month. Improve ibuprofen.   He endorses snoring and 'ok' sleep.   Denies hearing changes.            Pt was last seen on 4/2021 and at that time we recommended:  Multi-factorial headache  -MVT, Vit D3 1000 Unit supplementation  -will consider vascular and spine imaging, if persists or worsesn  -sleep/wake cycle:   -sleep hygiene   -melatonin 3mg at night,   -headaches:   -migraine journal   -magnesium oxide 400mg daily,, Coenzyme Q10 100mg TID   -consider gabapentin   -diclofenac gel 1% 3-4 times as needed  -PT Cervical ROM  -aroma therapy  -procedure:   Trigeminal and occipital Nerve block, trigger point injection    HLP: atorvastatin  10mg daily, brainhealth, life style modifications    Sciatica: PT HEP, NSAIDS PRN, monitor    In the interim,  Tolerated AMBROSIO and trigger point injection with moderate relief.  Did not tolerate gabapentin and d/c.   Currently he is doing well. He does still occasionally have right sided headaches that are 10/10 of pain.  It can wake him for sleep.  Potential food triggers.  That occurs 1-2 times a month.  No associated rhinorhea, flushing, tearing.   LBP is stable.  Neck is improved.       Pt was last seen on 10/2019 and at that time we recommended:  Trigeminal Neuralgia  -MVT, Vit D3 1000 Unit supplementation  -will consider vascular imaging, if persists or worsesn  -sleep/wake cycle:   -sleep hygiene   -melatonin 3mg at night,   -headaches:   -migraine journal   -magnesium oxide 400mg daily, defer gabapentin   --start Coenzyme Q10 100mg TID   -diclofenac gel 1% 3-4 times as needed  -aroma therapy  -procedure:   Trigeminal Nerve block, deferred    HLP: atorvastatin 10mg daily, brainhealth, life style modifications    In the interim, pt has noted 1-2 months of worsening headaches.  He describes it has radiating pain on right scalp that moves forward and migrates backwards.   He denies numbness, focal weakness, tingling, or muscle loss.   Rates pain has 4/10.  He has used NSAIDs, acetaminophen which does not help.    He describes it as differently from a muscle strain.   He has used diclofenac gel which is not helpful.     Pt was last seen on 7/2019 and at that time we recommended:  Trigeminal Neuralgia  -MVT, Vit D3 1000 Unit supplementation  -will consider vascular imaging, if persists or worsesn  -sleep/wake cycle:   -sleep hygiene   -melatonin 3mg at night,   -headaches:   -migraine journal   -magnesium oxide 400mg daily, defer gabapentin   --start Coenzyme Q10 100mg TID   -diclofenac gel 1% 3-4 times as needed  -aroma therapy  -procedure:   Trigeminal Nerve block, deferred    HLP: atorvastatin 10mg daily,  brainhealth, life style modifications    Sciatica: PT HEP, NSAIDS PRN, monitor    In the interim, pt has been doing well.  He has had two episodes of R sided facial pain.  He is adherent to Mag-Ox.  Has not used gel or CoQ10.  Sleeping well.   Mood is good.   Low back is again.       Pt was last seen on 4/23/19 and at that time we recommended:  Trigeminal Neuralgia  -B12/folate, TSH, Vitamin D, B2/B6, iron panel  -will consider vascular imaging, if persists or worsesn  -sleep/wake cycle:   -sleep hygiene   -melatonin 3mg at night,   -headaches:   -migraine journal   -magnesium oxide 400mg daily, defer gabapentin   -diclofenac gel 1% 3-4 times as needed  -aroma therapy  -procedure:   Trigeminal Nerve block, deferred    HLP: brainhealth, life style modifications    In the interim, pt has been relatively stable.  He had an episode of R temple sharp pain that lasted for minutes and spontaneously resolved.  Did not  diclofenac gel.  Adherent to Vitamin D.  Sleep is 'OK'.  No issues falling or staying asleep.  No snoring.  Has not tried melatonin.  Back pain is stable. He does endorse 2 times a week radiating, tingling pain.   No weakness, muscle loss, saddle anesthesia, urinary/bowel/sexual dysfunction.      Initial HPI (4/23/2019)  Pt presented to [facility] ED in 2007 for R sided facial tingling.  Pt was evaluated with MRI and LP all of which was unremarkable.  Since then pt has had intermittent headaches.  It is always right sided that usually starts as a sharp radiating pain localizes to right temple and occiput..  It then evolves into a dull bothersome pain.  He rates it as 5/10.   Pt denies aura or triggers.  He has flares 2-3 times a year  He denies photo/phonophobia, n/v, slurred speech, vision, weakness, hearing.   NSAIDs help mild to moderate.    Pt has been [facility] by [facility] neurologists previously.   No medicine or procedures.  Of note, pt also notes sciatic pain in Right leg.  He endorses chronic.  He denies  weakness or numbness.  No incontinence.  Pt notes good sleep. No snoring .  Mood is OK.  Denies SI/HI.         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 to portal-st  8/6/24- pc complete. DBM    EPIDURAL STEROID INJECTION INTO LUMBAR SPINE Right 2/15/2024    Procedure: RT L4-5 and L5-S1 TFESI;  Surgeon: Magdaleno James MD;  Location: [facility] PAIN MANAGEMENT;  Service: Pain Management;  Laterality: Right;  30 mins    EPIDURAL STEROID INJECTION INTO LUMBAR SPINE N/A 6/17/2024    Procedure: [facility] L4/5;  Surgeon: Magdaleno James MD;  Location: [facility] PAIN MANAGEMENT;  Service: Pain Management;  Laterality: N/A;  20mins-no ac    KNEE ARTHROSCOPY W/ ACL RECONSTRUCTION      left    KNEE ARTHROSCOPY W/ MENISCAL REPAIR  1998    left    MINIMALLY INVASIVE SURGICAL REMOVAL OF INTERVERTEBRAL DISC OF SPINE USING MICROSCOPE N/A 3/27/2023    Procedure: RIGHT L5-S1 MICRODISCECTOMY,MINIMALLY INVASIVE APPROACH;  Surgeon: Saad Gordon MD;  Location: [facility] OR;  Service: Neurosurgery;  Laterality: N/A;  RIGHT L5-S1 MICRODISCECTOMY,MINIMALLY INVASIVE APPROACH   ANESTHESIA: GENERAL   NERVE MON; EMG, SEP  POSITION: PRONE  BED: [place] [id number] POST/ KATE FRAME  HEAD REST: PRONE VIEW  RADIOLOGY: C-ARM  MISC: VANIA DORADO     TRANSFORAMINAL EPIDURAL INJECTION OF STEROID Right 10/21/2022    Procedure: LUMBAR TRANSFORAMINAL RIGHT L5/S1 CONTRAST DIRECT REFERRAL;  Surgeon: Simone Prather MD;  Location: [facility];  Service: Pain Management;  Laterality: Right;       Family History   Problem Relation Name Age of Onset    Hypertension Mother      Hyperlipidemia Father      Cancer Maternal Grandmother          colon       Social History     Socioeconomic History    Marital status:     Number of children: 3   Occupational History    Occupation: Operations   Tobacco Use    Smoking status: Never     Passive exposure: Never    Smokeless tobacco: Never   Substance and Sexual Activity    Alcohol use: No    Drug use: No    Sexual activity: Yes     Partners: Female     Social Drivers of Health     Financial Resource Strain: Low Risk  (3/17/2025)    Overall Financial Resource Strain (CARDIA)     Difficulty of Paying Living Expenses: Not hard at all   Food Insecurity: No Food Insecurity (3/17/2025)    Hunger Vital Sign     Worried About Running Out of Food in the Last Year: Never true     Ran Out of Food in the Last Year: Never true   Transportation Needs: No Transportation Needs (3/17/2025)    PRAPARE - Transportation     Lack of Transportation (Medical): No     Lack of Transportation (Non-Medical): No   Housing Stability: Low Risk  (3/17/2025)    Housing Stability Vital Sign     Unable to Pay for Housing in the Last Year: No     Homeless in the Last Year: No         Current Outpatient Medications:     atorvastatin (LIPITOR) 40 MG tablet, Take 1 tablet (40 mg total) by mouth once daily., Disp: 90 tablet, Rfl: 3    hydroCHLOROthiazide (HYDRODIURIL) 25 MG tablet, Take 1 tablet (25 mg total) by mouth once daily., Disp: 90 tablet, Rfl: 3    multivitamin (THERAGRAN) per tablet, Take 1 tablet by mouth once daily., Disp: , Rfl:     NIFEdipine (PROCARDIA-XL) 30 MG (OSM) 24 hr tablet, Take 60 mg by mouth once daily., Disp: , Rfl:     sildenafiL  (VIAGRA) 100 MG tablet, Take 1 tablet (100 mg total) by mouth daily as needed for Erectile Dysfunction., Disp: 30 tablet, Rfl: 2    losartan (COZAAR) 25 MG tablet, Take 1 tablet (25 mg total) by mouth once daily. (Patient not taking: Reported on 6/10/2025), Disp: 90 tablet, Rfl: 3    Review of Systems  Review of Systems   Constitutional:  Negative for chills, fever and weight loss.   HENT:  Negative for hearing loss and tinnitus.    Respiratory:  Negative for cough.    Cardiovascular:  Negative for chest pain and palpitations.   Gastrointestinal:  Negative for nausea and vomiting.   Musculoskeletal:  Positive for back pain and neck pain. Negative for falls, joint pain and myalgias.   Neurological:  Positive for tingling and headaches. Negative for dizziness, tremors, sensory change, speech change, focal weakness, seizures, loss of consciousness and weakness.   Psychiatric/Behavioral:  Negative for depression, hallucinations, memory loss, substance abuse and suicidal ideas. The patient is not nervous/anxious and does not have insomnia.        Objective:     Vitals:    06/10/25 0833   BP: 116/74   Pulse: 65      Physical Exam      Constitutional: male appears well-developed and well-nourished.   HENT:   Head: Normocephalic and atraumatic.   Neck and spine: limited cervcial ext/flex, range of motion. Neck supple. No TTP in cervical, thoracic, and lumbar spine  Cardiovascular: Normal rate, regular rhythm   Pulmonary/Chest: Effort normal and breath sounds normal.   Abdominal: Soft. Bowel sounds are normal.   Skin: Skin is warm.   Ext: No c/c/e noted    The patient is awake, attentive, Alert, oriented to person, place and time.  Language is intact to comprehension, repetition, and production  Able to follow multi-step commands  No findings to suggest executive dysfuntion    Patient has adequate insight    Mood is stable    Funduscopic exam - disc were flat and pink, no papilledema bilaterally and posterior segments show  no vessel changes, exudates, or hemorrhages  Pursuits were smooth, normal saccades, no nystagmus bilaterally  PERRL, VFFC, EOMI,  sensation is intact V1-V3 on R to LT and PP, NO CI   Motor - facial movement was symmetrical and normal, no facial droop seen.   hearing grossly intact  Palate moved well and was symmetrical with normal palatal and oral sensation  Tongue protrudes midline and movements were full  Traps raise b/l equally, tense R >L    Normal tone.  No spasticity, cogwheel rigidity  Normal bulk. No pronator drift                            Left      Right  Deltoid            5          5  Biceps            5          5  Triceps           5          5                   5          5    Hip Flex            5          5  Hip Ext             5          5  Leg ABduc       5          5  Leg ADduc       5          5  Knee Flex         5          5  Knee Ext          5          5  Plantar Flexion  5          5  Dorsiflexion       5          5  Inversion            5          5  Eversion            5          5      No tremor noted    Reflexes brisk in RLE(patellar, cross-adductors)  And o/w symmteric in bl upper and lower extremities, including biceps, triceps, and patellar    Sensory:  Light touch:  Diminished on lateral proximal RLE    Coordination:  F-to-N:  intact    Rapid-alt movements:  Normal amplitude and frequency     Romberg Sign:  negative  General gait:   Normal arm swing and turns. Normal postural reflexes.   Tandem gait:  intact      Results for BLOSSOM ALCANTARA (MRN [id number]) as of 4/23/2019 12:38   Ref. Range 11/19/2007 15:16   Heme Aliquot Latest Units: ml 1.5   Appearance, CSF Unknown Bloody   WBC, CSF Latest Ref Range: 0 - 5 /CuMm 4   RBC, CSF Latest Ref Range: 0 - 5 /CuMm 2,750 (A)   Segmented Neutrophils, CSF Latest Ref Range: 0 - 6 % 90 (H)   Lymphs, CSF Latest Ref Range: 0 - 80 % 10   Glucose, CSF Latest Ref Range: 40 - 70 mg/dl 52   Protein, CSF Latest Ref Range: 12 - 60 mg/dl 39    VDRL, CSF Latest Ref Range: Non-Reactive  Non-Reactive     Results for BLOSSOM ALCANTARA (MRN [id number]) as of 4/23/2019 12:38   Ref. Range 6/4/2018 09:33   Cholesterol Latest Ref Range: 120 - 199 mg/dL 250 (H)   HDL Latest Ref Range: 40 - 75 mg/dL 47   HDL/Chol Ratio Latest Ref Range: 20.0 - 50.0 % 18.8 (L)   LDL Cholesterol Latest Ref Range: 63.0 - 159.0 mg/dL 185.0 (H)   Non-HDL Cholesterol Latest Units: mg/dL 203   Total Cholesterol/HDL Ratio Latest Ref Range: 2.0 - 5.0  5.3 (H)   Triglycerides Latest Ref Range: 30 - 150 mg/dL 90     Results for BLOSSOM ALCANTARA (MRN [id number]) as of 7/16/2019 08:54   Ref. Range 4/23/2019 13:35 6/29/2019 09:09   WBC Latest Ref Range: 3.90 - 12.70 K/uL 4.14    RBC Latest Ref Range: 4.60 - 6.20 M/uL 5.00    Hemoglobin Latest Ref Range: 14.0 - 18.0 g/dL 14.4    Hematocrit Latest Ref Range: 40.0 - 54.0 % 42.6    MCV Latest Ref Range: 82 - 98 fL 85    MCH Latest Ref Range: 27.0 - 31.0 pg 28.8    MCHC Latest Ref Range: 32.0 - 36.0 g/dL 33.8    RDW Latest Ref Range: 11.5 - 14.5 % 13.8    Platelets Latest Ref Range: 150 - 350 K/uL 259    MPV Latest Ref Range: 9.2 - 12.9 fL 9.5    Gran% Latest Ref Range: 38.0 - 73.0 % 46.2    Gran # (ANC) Latest Ref Range: 1.8 - 7.7 K/uL 1.9    Lymph% Latest Ref Range: 18.0 - 48.0 % 44.2    Lymph # Latest Ref Range: 1.0 - 4.8 K/uL 1.8    Mono% Latest Ref Range: 4.0 - 15.0 % 6.8    Mono # Latest Ref Range: 0.3 - 1.0 K/uL 0.3    Eosinophil% Latest Ref Range: 0.0 - 8.0 % 2.4    Eos # Latest Ref Range: 0.0 - 0.5 K/uL 0.1    Basophil% Latest Ref Range: 0.0 - 1.9 % 0.2    Baso # Latest Ref Range: 0.00 - 0.20 K/uL 0.01    Differential Method Unknown Automated    Iron Latest Ref Range: 45 - 160 ug/dL 76    TIBC Latest Ref Range: 250 - 450 ug/dL 312    Saturated Iron Latest Ref Range: 20 - 50 % 24    Transferrin Latest Ref Range: 200 - 375 mg/dL 211    Ferritin Latest Ref Range: 20.0 - 300.0 ng/mL 307 (H)    Folate Latest Ref Range: 4.0 - 24.0 ng/mL 9.8     Vitamin B-12 Latest Ref Range: 210 - 950 pg/mL 217    Sodium Latest Ref Range: 136 - 145 mmol/L  140   Potassium Latest Ref Range: 3.5 - 5.1 mmol/L  4.3   Chloride Latest Ref Range: 95 - 110 mmol/L  104   CO2 Latest Ref Range: 23 - 29 mmol/L  27   Anion Gap Latest Ref Range: 8 - 16 mmol/L  9   BUN, Bld Latest Ref Range: 6 - 20 mg/dL  11   Creatinine Latest Ref Range: 0.5 - 1.4 mg/dL  1.2   eGFR if non African American Latest Ref Range: >60 mL/min/1.73 m^2  >60   eGFR if  Latest Ref Range: >60 mL/min/1.73 m^2  >60   Glucose Latest Ref Range: 70 - 110 mg/dL  95   Calcium Latest Ref Range: 8.7 - 10.5 mg/dL  9.3   Alkaline Phosphatase Latest Ref Range: 55 - 135 U/L  40 (L)   PROTEIN TOTAL Latest Ref Range: 6.0 - 8.4 g/dL  6.9   Albumin Latest Ref Range: 3.5 - 5.2 g/dL  3.8   BILIRUBIN TOTAL Latest Ref Range: 0.1 - 1.0 mg/dL  0.7   AST Latest Ref Range: 10 - 40 U/L  13   ALT Latest Ref Range: 10 - 44 U/L  18   Triglycerides Latest Ref Range: 30 - 150 mg/dL  70   Cholesterol Latest Ref Range: 120 - 199 mg/dL  252 (H)   HDL Latest Ref Range: 40 - 75 mg/dL  57   Hdl/Cholesterol Ratio Latest Ref Range: 20.0 - 50.0 %  22.6   LDL Cholesterol External Latest Ref Range: 63.0 - 159.0 mg/dL  181.0 (H)   Non-HDL Cholesterol Latest Units: mg/dL  195   Total Cholesterol/HDL Ratio Latest Ref Range: 2.0 - 5.0   4.4   Thiamine Latest Ref Range: 38 - 122 ug/L 46    Vit D, 25-Hydroxy Latest Ref Range: 30 - 96 ng/mL 20 (L)    TSH Latest Ref Range: 0.400 - 4.000 uIU/mL 0.538    PSA, SCREEN Latest Ref Range: 0.00 - 4.00 ng/mL  0.45     MR L spine (2024) Neuro-imaging personally reviewed  47yo Male who  reports right leg numbness on his thigh with radiation to his toe occasionally.  He denies pain or weakness, specifically no pain in his back.  He denies falls or muscle loss.  He denies any medication or activity changes.  He has had episodes like this in the past but usually self resolve.  Current episode is persistent and  impacting mobility.  No changes in bladder/bowel, sexual function.      Assessment and plan:    Likely peripheral, but potential central etiologies.   Potentially meralgia paresthetica, so advised to minimize use of belts and encouraged weight loss.  Will use above investigations to assess for other etiologies.       Will acquire MRI Lumbar spine w/o (due to seafood allergy).  Will also obtain lab work Serum glucose Serum protein electrophoresis Vitamin B12 level Anti-nuclear antibody Erythrocyte sedimentation rate Rapid plasma reagin (RPR) Glycohemoglobin  : HIV serology, Urine/blood for heavy metals, , Rheumatoid factor  Vitamin B1 (thiamine).    Will also order PT.    TSH   Date/Time Value Ref Range Status   07/27/2022 09:32 AM 0.616 0.400 - 4.000 uIU/mL Final   04/23/2019 01:35 PM 0.538 0.400 - 4.000 uIU/mL Final   11/19/2007 04:35 AM 1.5 0.4 - 4.0 uIU/ml Final   11/14/2007 05:45 AM 0.94 0.4 - 4.0 uIU/ml Final     Folate   Date/Time Value Ref Range Status   04/23/2019 01:35 PM 9.8 4.0 - 24.0 ng/mL Final     No results found for this or any previous visit.  Assessment:     Problem List Items Addressed This Visit          Neuro    Right leg numbness    Current Assessment & Plan   See below         Trigeminal neuralgia - Primary    Current Assessment & Plan   See below            Cardiac/Vascular    Hyperlipidemia    Overview   -Naive -206 ([id number])         Current Assessment & Plan   See below            Endocrine    Vitamin D deficiency    Current Assessment & Plan   See below               1. Trigeminal neuralgia        2. Right leg numbness  Ambulatory referral/consult to Neurology      3. Vitamin D deficiency        4. Mixed hyperlipidemia            49 RH AAM w/ HLP, s/p L5-S1 right hemilaminectomy and bilateral foraminotomies (3/2023), o/w healthy who presents for headache.  History is notable for 2-3 annual flares of R sided tingling and pain since 2007.  Interval history is persistent  lumbar-related mobility and pain issues s/p surgical procedure in 2023 and 2 ESIs in 2024 with acute on chronic worsening.   Exam is notable for WDWN AAM with intact cervical range of motion.  Neuro exam is notable for normal fundus exam, NO CI, EOMI, PERRL, VFFC, no nystagmus, intact R-sided facial sensation, LT intact, LEAL, BLE 5/5 intact, slightly increased reflexed on RLE, negative romberg, intact gait.   Workup is notable abnormal FLP, previously WNL CSF, reportedly normal neuro-imaging in the past, low Vit D, WNL -B12/folate, TSH, B2/B6, iron panel.  MRI WNL.  MRA with incidental artifactual L Cavernous ICA findings.  MR L spine (2024) demonstrated: Postsurgical changes of L5-S1 right hemilaminectomy and bilateral foraminotomies.   Multilevel lumbar spondylosis as detailed above, similar when compared to MRI dated 01/16/2024.  Current presentation cocnerning for lumbar radiclopathy.  Concern for VIRA.    Previous trigeminal neuralgia; query hemicrania continua with stabilization s/p nerve blocks and trigger point injection.   Component of cervical strain.  Low suspicion for cervical radiculopathy.         Plan:       Neuropathic pain:   -magnesium oxide 400mg daily (or magnesium glycinate), Coenzyme Q10 100mg TID  -discussed gabapentin    Snoring: concern for VIRA  -Sleep referral  -sleep hygiene    Multi-factorial headache  -MVT, Vit D3 1000 Unit supplementation  -sleep/wake cycle:   -sleep hygiene   -melatonin 3mg at night,   -headaches:   -migraine journal   -magnesium oxide 400mg daily (or magnesium glycinate), Coenzyme Q10 100mg TID   -consider CBZ   -indomethacin PRN for severe headaches, diclofenac gel 1% 3-4 times as needed  -PT Cervical ROM  -aroma therapy  -procedure:   Trigeminal and occipital Nerve block, trigger point injection      Primary Stroke Prevention  - Plan for secondary prevention of stroke:  (a) Antiplatelet medication: deferred. (b)Cholesterol medication: atorvastatin 40mg daily,  brainhealth, life style modifications . (c) anti-hypertensive medications: nifedipine, HCTZ   -goal LDL-C between 70 mg/dL (1.81 mmol/L) and 189 mg/dL (4.90 mmol/L)    -goal blood pressure is usually <140 systolic as well as <90 mmHg   -educated about healthy diet: low fat, avoid saturated fats, high in vegetables and fruits  -other life-style modifications:  weight reduction, especially in overweight/obese patients, salt restriction, and avoidance of excess alcohol intake      I went over the usual stroke warning signs and symptoms, and the need to activate EMS (call 911) as soon as the symptoms present.  - Sudden onset numbness or weakness of the face, arm, or leg; especially on one side of the body  - Sudden confusion, trouble speaking, or understanding  - Sudden trouble seeing in one or both eyes  - Sudden trouble walking, dizziness, loss of coordination  - Sudden severe headache with no known cause        Patient requested to message in 1 month to re-assess and determine appropriate next steps as well as follow-up      Sapna Parra MD  Neurologist  [facility]     Focused examination was undertaken today.  I spent 45 minutes with the patient  total face to face with the patient.  >50% of that time was spent on counseling regarding his symptoms, review of diagnostics, and building and coordinating a treatment plan based on the combination of results and symptoms.   Questions were sought and answered to her stated verbal satisfaction.    Sapna Parra MD  Neurologist  [facility]

## 2025-06-26 DIAGNOSIS — E78.5 HYPERLIPIDEMIA, UNSPECIFIED HYPERLIPIDEMIA TYPE: ICD-10-CM

## 2025-06-26 RX ORDER — ATORVASTATIN CALCIUM 10 MG/1
10 TABLET, FILM COATED ORAL
Qty: 90 TABLET | Refills: 3 | OUTPATIENT
Start: 2025-06-26

## 2025-06-27 NOTE — TELEPHONE ENCOUNTER
No care due was identified.  [facility] Embedded Care Due Messages. Reference number: [id number].   6/26/2025 7:39:25 PM CDT

## 2025-06-27 NOTE — TELEPHONE ENCOUNTER
Refill Decision Note   Daniel Garcias  is requesting a refill authorization.  Brief Assessment and Rationale for Refill:  Quick Discontinue     Medication Therapy Plan:  Medication Discontinued (not pt's current therapy/dose)      Comments:     Note composed:9:40 PM 06/26/2025

## 2025-07-07 ENCOUNTER — PATIENT MESSAGE (OUTPATIENT)
Dept: NEUROLOGY | Facility: CLINIC | Age: 50
End: 2025-07-07
Payer: COMMERCIAL

## 2025-07-29 ENCOUNTER — OFFICE VISIT (OUTPATIENT)
Dept: PODIATRY | Facility: CLINIC | Age: 50
End: 2025-07-29
Payer: COMMERCIAL

## 2025-07-29 VITALS
DIASTOLIC BLOOD PRESSURE: 75 MMHG | HEIGHT: 75 IN | SYSTOLIC BLOOD PRESSURE: 144 MMHG | HEART RATE: 64 BPM | WEIGHT: 296.94 LBS | BODY MASS INDEX: 36.92 KG/M2

## 2025-07-29 DIAGNOSIS — M54.17 LUMBOSACRAL RADICULOPATHY: ICD-10-CM

## 2025-07-29 DIAGNOSIS — M21.611 BILATERAL BUNIONS: Primary | ICD-10-CM

## 2025-07-29 DIAGNOSIS — M21.612 BILATERAL BUNIONS: Primary | ICD-10-CM

## 2025-07-29 DIAGNOSIS — M79.671 CHRONIC PAIN OF BOTH FEET: ICD-10-CM

## 2025-07-29 DIAGNOSIS — B35.1 TINEA UNGUIUM: ICD-10-CM

## 2025-07-29 DIAGNOSIS — M79.672 CHRONIC PAIN OF BOTH FEET: ICD-10-CM

## 2025-07-29 DIAGNOSIS — G89.29 CHRONIC PAIN OF BOTH FEET: ICD-10-CM

## 2025-07-29 PROCEDURE — 99999 PR PBB SHADOW E&M-EST. PATIENT-LVL III: CPT | Mod: PBBFAC,,, | Performed by: STUDENT IN AN ORGANIZED HEALTH CARE EDUCATION/TRAINING PROGRAM

## 2025-07-29 PROCEDURE — 99204 OFFICE O/P NEW MOD 45 MIN: CPT | Mod: S$GLB,,, | Performed by: STUDENT IN AN ORGANIZED HEALTH CARE EDUCATION/TRAINING PROGRAM

## 2025-07-29 RX ORDER — TERBINAFINE HYDROCHLORIDE 250 MG/1
250 TABLET ORAL DAILY
Qty: 90 TABLET | Refills: 0 | Status: SHIPPED | OUTPATIENT
Start: 2025-07-29 | End: 2025-10-27

## 2025-07-29 RX ORDER — DULOXETIN HYDROCHLORIDE 30 MG/1
30 CAPSULE, DELAYED RELEASE ORAL DAILY
Qty: 30 CAPSULE | Refills: 11 | Status: SHIPPED | OUTPATIENT
Start: 2025-07-29 | End: 2026-07-29

## 2025-07-29 NOTE — PROGRESS NOTES
Subjective:     Patient    Daniel Garcias is a 50 y.o. male.    Problem    New to [facility]. Presents for bilateral painful bunions with 2nd and 3rd toe deformities all right worse than left. Hurts worse with standing and walking, can be very painful in shoes. Has attempted change in activity, change in footwear, inserts, padding, gabapentin, pregabalin, amitriptyline without resolution. Also with thick discolored toenails right 1st and 3rd toes and peeling skin of both feet which is untreated; stays in a lot of hotels. Also with numbness, tingling, burning, etc into BLE with low back pain with prior back surgery.      History    History obtained from patient and review of medical records.     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 to portal-st  8/6/24- pc complete. DBM    EPIDURAL STEROID INJECTION INTO LUMBAR SPINE Right 2/15/2024    Procedure: RT L4-5 and L5-S1 TFESI;  Surgeon: Magdaleno James MD;  Location: [facility]IN MANAGEMENT;  Service: Pain Management;  Laterality: Right;  30 mins    EPIDURAL STEROID INJECTION INTO LUMBAR SPINE N/A 6/17/2024    Procedure: SEAN L4/5;  Surgeon: Magdaleno James MD;  Location: [facility]IN [facility];  Service: Pain Management;  Laterality:  N/A;  20mins-no ac    KNEE ARTHROSCOPY W/ ACL RECONSTRUCTION      left    KNEE ARTHROSCOPY W/ MENISCAL REPAIR      left    MINIMALLY INVASIVE SURGICAL REMOVAL OF INTERVERTEBRAL DISC OF SPINE USING MICROSCOPE N/A 3/27/2023    Procedure: RIGHT L5-S1 MICRODISCECTOMY,MINIMALLY INVASIVE APPROACH;  Surgeon: Saad Gordon MD;  Location: [facility] OR;  Service: Neurosurgery;  Laterality: N/A;  RIGHT L5-S1 MICRODISCECTOMY,MINIMALLY INVASIVE APPROACH   ANESTHESIA: GENERAL   NERVE MON; EMG, SEP  POSITION: PRONE  BED: [place] [address] POST/ KATE FRAME  HEAD REST: PRONE VIEW  RADIOLOGY: C-ARM  MISC: MICROSCOPE, CARO    TRANSFORAMINAL EPIDURAL INJECTION OF STEROID Right 10/21/2022    Procedure: LUMBAR TRANSFORAMINAL RIGHT L5/S1 CONTRAST DIRECT REFERRAL;  Surgeon: Simone Prather MD;  Location: [facility];  Service: Pain Management;  Laterality: Right;        Objective:     Vitals  Wt Readings from Last 1 Encounters:   25 134.7 kg (296 lb 15.4 oz)     Temp Readings from Last 1 Encounters:   10/15/24 98.1 °F (36.7 °C) (Oral)     BP Readings from Last 1 Encounters:   25 (!) 144/75     Pulse Readings from Last 1 Encounters:   25 64       Dermatological Exam    Skin:  Pedal hair growth, skin color, and skin texture normal on right; dry scaly skin  Pedal hair growth, skin color, and skin texture normal on left; dry scaly skin    Nails:  All nails normal in length, thickness, color except right 1st and 3rd toenails thick and discolored    Vascular Exam    Arteries:  Posterior tibial artery palpable on right  Dorsalis pedis artery palpable on right  Posterior tibial artery palpable on left  Dorsalis pedis artery palpable on left    Veins:  Superficial veins unremarkable on right  Superficial veins unremarkable on left    Swellin+ nonpitting on right  1+ nonpitting on left    Neurological Exam    [place] touch test:  6/6 sites sensed, light touch intact     Musculoskeletal Exam    Footwear:  dress on right  dress on  left    Gait Exam:   Ambulatory Status: Ambulatory  Gait: Antalgic  Assistive Devices: None    Foot Progression Angle:  Normal on right  Normal on left    Right Lower Extremity Additional Findings:  Moderate hallux valgus deformity with moderate tenderness on palpation of MTPJ with partial reproduction on PROM, reducible  2nd toe elevated and abducted with mild tenderness on palpation of MTPJ  3rd toe clawed/hammered with mild tenderness on palpation of DIPJ  Right foot and ankle function, strength, and range of motion unremarkable except as noted above.     Left Lower Extremity Additional Findings:  Moderate hallux valgus deformity with moderate tenderness on palpation of MTPJ with partial reproduction on PROM, reducible  2nd toe elevated and abducted with mild tenderness on palpation of MTPJ  3rd toe clawed/hammered with mild tenderness on palpation of DIPJ  Left foot and ankle function, strength, and range of motion unremarkable except as noted above.    Imaging and Other Tests    Imaging:  Independently reviewed and interpreted imaging, findings are as follows: pending     Assessment:     Encounter Diagnoses   Name Primary?    Bilateral bunions Yes    Chronic pain of both feet     Tinea unguium     Lumbosacral radiculopathy         Plan:     I counseled the patient on his conditions, their implications and medical management.    Bilateral bunions, toe deformities, pain: chronic stable  -X rays ordered.   -Anticipate bilateral Lapidus + Akin bunionectomies with 2nd and 3rd toe corrections.   -Will make final surgical plan once X rays are obtained.     Neuropathy, BLE nerve symptoms/pain: chronic stable  -Suspect spinal origin.   -Start duloxetine.     Tinea unguium, tinea pedis: chronic stable  -Recommended wearing comfortable well fitting shoes; discarding old footwear or disinfecting old footwear with naphthalene mothballs; avoiding trauma to the nails; avoiding sharing nail clippers with family or friends;  avoiding nail salons that do not employ sterile techniques; and wearing flip flops or shower shoes at public pools, showers, locker rooms, and hotels.  -Discussed treatment options including (1) monitoring, (2) debridement, (3) topical antifungals, (4) oral antifungals. Discussed potential risks, benefits, alternatives to each. Patient opted for oral antifungals.   -Reviewed most recent CMP/LFT, ordered 3 months PO terbinafine.         Return to clinic in 1-2 weeks for surgical planning, call sooner PRN.

## 2025-08-11 ENCOUNTER — HOSPITAL ENCOUNTER (EMERGENCY)
Facility: HOSPITAL | Age: 50
Discharge: HOME | End: 2025-08-11
Attending: STUDENT IN AN ORGANIZED HEALTH CARE EDUCATION/TRAINING PROGRAM
Payer: COMMERCIAL

## 2025-08-11 ENCOUNTER — HOSPITAL ENCOUNTER (OUTPATIENT)
Dept: RADIOLOGY | Facility: HOSPITAL | Age: 50
Discharge: HOME | End: 2025-08-11
Attending: STUDENT IN AN ORGANIZED HEALTH CARE EDUCATION/TRAINING PROGRAM
Payer: COMMERCIAL

## 2025-08-11 VITALS
RESPIRATION RATE: 20 BRPM | HEART RATE: 58 BPM | HEIGHT: 75 IN | BODY MASS INDEX: 35.43 KG/M2 | WEIGHT: 285 LBS | TEMPERATURE: 99 F | DIASTOLIC BLOOD PRESSURE: 100 MMHG | OXYGEN SATURATION: 99 % | SYSTOLIC BLOOD PRESSURE: 148 MMHG

## 2025-08-11 DIAGNOSIS — M79.89 RIGHT LEG SWELLING: Primary | ICD-10-CM

## 2025-08-11 DIAGNOSIS — M21.612 BILATERAL BUNIONS: ICD-10-CM

## 2025-08-11 DIAGNOSIS — M79.672 CHRONIC PAIN OF BOTH FEET: ICD-10-CM

## 2025-08-11 DIAGNOSIS — G89.29 CHRONIC PAIN OF BOTH FEET: ICD-10-CM

## 2025-08-11 DIAGNOSIS — M21.611 BILATERAL BUNIONS: ICD-10-CM

## 2025-08-11 DIAGNOSIS — M79.671 CHRONIC PAIN OF BOTH FEET: ICD-10-CM

## 2025-08-11 PROCEDURE — 99284 EMERGENCY DEPT VISIT MOD MDM: CPT | Mod: 25

## 2025-08-11 PROCEDURE — 25000003 PHARM REV CODE 250: Performed by: PHYSICIAN ASSISTANT

## 2025-08-11 PROCEDURE — 73630 X-RAY EXAM OF FOOT: CPT | Mod: TC,50

## 2025-08-11 PROCEDURE — 73630 X-RAY EXAM OF FOOT: CPT | Mod: 26,50,, | Performed by: RADIOLOGY

## 2025-08-11 RX ORDER — IBUPROFEN 600 MG/1
600 TABLET, FILM COATED ORAL
Status: COMPLETED | OUTPATIENT
Start: 2025-08-11 | End: 2025-08-11

## 2025-08-11 RX ADMIN — IBUPROFEN 600 MG: 600 TABLET ORAL at 10:08

## 2025-08-14 ENCOUNTER — OFFICE VISIT (OUTPATIENT)
Dept: PODIATRY | Facility: CLINIC | Age: 50
End: 2025-08-14
Payer: COMMERCIAL

## 2025-08-14 VITALS — BODY MASS INDEX: 35.44 KG/M2 | WEIGHT: 285.06 LBS | HEIGHT: 75 IN

## 2025-08-14 DIAGNOSIS — M54.17 LUMBOSACRAL RADICULOPATHY: ICD-10-CM

## 2025-08-14 DIAGNOSIS — M21.612 BILATERAL BUNIONS: Primary | ICD-10-CM

## 2025-08-14 DIAGNOSIS — M79.672 CHRONIC PAIN OF BOTH FEET: ICD-10-CM

## 2025-08-14 DIAGNOSIS — Z41.9 ELECTIVE SURGERY: ICD-10-CM

## 2025-08-14 DIAGNOSIS — G89.29 CHRONIC PAIN OF BOTH FEET: ICD-10-CM

## 2025-08-14 DIAGNOSIS — M79.671 CHRONIC PAIN OF BOTH FEET: ICD-10-CM

## 2025-08-14 DIAGNOSIS — B35.1 TINEA UNGUIUM: ICD-10-CM

## 2025-08-14 DIAGNOSIS — M21.611 BILATERAL BUNIONS: Primary | ICD-10-CM

## 2025-08-14 PROCEDURE — 98006 SYNCH AUDIO-VIDEO EST MOD 30: CPT | Mod: 95,,, | Performed by: STUDENT IN AN ORGANIZED HEALTH CARE EDUCATION/TRAINING PROGRAM

## 2025-08-20 ENCOUNTER — TELEPHONE (OUTPATIENT)
Dept: PODIATRY | Facility: CLINIC | Age: 50
End: 2025-08-20
Payer: COMMERCIAL

## 2025-08-25 ENCOUNTER — TELEPHONE (OUTPATIENT)
Dept: PODIATRY | Facility: CLINIC | Age: 50
End: 2025-08-25
Payer: COMMERCIAL

## 2025-08-25 ENCOUNTER — OFFICE VISIT (OUTPATIENT)
Dept: SLEEP MEDICINE | Facility: CLINIC | Age: 50
End: 2025-08-25
Payer: COMMERCIAL

## 2025-08-25 DIAGNOSIS — R06.83 SNORING: Primary | ICD-10-CM

## 2025-08-25 DIAGNOSIS — G47.19 OTHER HYPERSOMNIA: ICD-10-CM

## 2025-08-25 DIAGNOSIS — G47.00 INSOMNIA, UNSPECIFIED TYPE: ICD-10-CM

## 2025-08-25 DIAGNOSIS — I10 HYPERTENSION, ESSENTIAL: ICD-10-CM

## 2025-08-25 PROCEDURE — 98002 SYNCH AUDIO-VIDEO NEW MOD 45: CPT | Mod: 95,,, | Performed by: NURSE PRACTITIONER

## (undated) DEVICE — ADHESIVE DERMABOND ADVANCED

## (undated) DEVICE — GLOVE BIOGEL SKINSENSE PI 8.0

## (undated) DEVICE — BUR BONE CUT MICRO TPS 3X3.8MM

## (undated) DEVICE — DRAPE LEICA MICROSCOPE 48X120

## (undated) DEVICE — DRAPE TOP 53X102IN

## (undated) DEVICE — SEE MEDLINE ITEM 156905

## (undated) DEVICE — COVER CAMERA OPERATING ROOM

## (undated) DEVICE — DRAPE C-ARM ELAS CLIP 42X120IN

## (undated) DEVICE — CORD FOR BIPOLAR FORCEPS 12

## (undated) DEVICE — UNDERGLOVES BIOGEL PI SZ 6 LF

## (undated) DEVICE — TUBE FRAZIER 5MM 2FT SOFT TIP

## (undated) DEVICE — CLIPPER BLADE MOD 4406 (CAREF)

## (undated) DEVICE — SUT VICRYL PLUS 3-0 SH 18IN

## (undated) DEVICE — KIT SURGIFLO HEMOSTATIC MATRIX

## (undated) DEVICE — Device

## (undated) DEVICE — BLADE ELECTRO EDGE INSULATED

## (undated) DEVICE — APPLICATOR CHLORAPREP ORN 26ML

## (undated) DEVICE — DRAPE STERI-DRAPE 1000 17X11IN

## (undated) DEVICE — SUT MCRYL PLUS 4-0 PS2 27IN

## (undated) DEVICE — ELECTRODE REM PLYHSV RETURN 9

## (undated) DEVICE — CARTRIDGE OIL

## (undated) DEVICE — DIFFUSER

## (undated) DEVICE — UNDERGLOVES BIOGEL PI SIZE 8.5

## (undated) DEVICE — DRESSING SURGICAL 1/2X1/2

## (undated) DEVICE — DRAPE ABDOMINAL TIBURON 14X11

## (undated) DEVICE — SUT 0 VICRYL / UR6 (J603)

## (undated) DEVICE — DRESSING AQUACEL FOAM 5 X 5

## (undated) DEVICE — SUT ETHILON 2-0 PSLX 30IN

## (undated) DEVICE — SUT VICRYL PLUS 0 CT1 18IN

## (undated) DEVICE — NDL SPINAL 18GX3.5 SPINOCAN

## (undated) DEVICE — GLOVE BIOGEL PI MICRO SZ 6